# Patient Record
Sex: FEMALE | Race: WHITE | NOT HISPANIC OR LATINO | Employment: OTHER | URBAN - METROPOLITAN AREA
[De-identification: names, ages, dates, MRNs, and addresses within clinical notes are randomized per-mention and may not be internally consistent; named-entity substitution may affect disease eponyms.]

---

## 2024-01-29 ENCOUNTER — LAB REQUISITION (OUTPATIENT)
Dept: LAB | Facility: CLINIC | Age: 64
End: 2024-01-29
Payer: COMMERCIAL

## 2024-01-29 DIAGNOSIS — Z13.6 ENCOUNTER FOR SCREENING FOR CARDIOVASCULAR DISORDERS: ICD-10-CM

## 2024-01-29 DIAGNOSIS — Z13.1 ENCOUNTER FOR SCREENING FOR DIABETES MELLITUS: ICD-10-CM

## 2024-01-29 LAB
ALBUMIN SERPL BCG-MCNC: 4.2 G/DL (ref 3.5–5.2)
ALP SERPL-CCNC: 123 U/L (ref 40–150)
ALT SERPL W P-5'-P-CCNC: 19 U/L (ref 0–50)
ANION GAP SERPL CALCULATED.3IONS-SCNC: 10 MMOL/L (ref 7–15)
AST SERPL W P-5'-P-CCNC: 19 U/L (ref 0–45)
BILIRUB SERPL-MCNC: 0.2 MG/DL
BUN SERPL-MCNC: 12.9 MG/DL (ref 8–23)
CALCIUM SERPL-MCNC: 9.3 MG/DL (ref 8.8–10.2)
CHLORIDE SERPL-SCNC: 104 MMOL/L (ref 98–107)
CHOLEST SERPL-MCNC: 211 MG/DL
CREAT SERPL-MCNC: 0.61 MG/DL (ref 0.51–0.95)
DEPRECATED HCO3 PLAS-SCNC: 27 MMOL/L (ref 22–29)
EGFRCR SERPLBLD CKD-EPI 2021: >90 ML/MIN/1.73M2
FASTING STATUS PATIENT QL REPORTED: ABNORMAL
GLUCOSE SERPL-MCNC: 113 MG/DL (ref 70–99)
HDLC SERPL-MCNC: 45 MG/DL
LDLC SERPL CALC-MCNC: 97 MG/DL
NONHDLC SERPL-MCNC: 166 MG/DL
POTASSIUM SERPL-SCNC: 4 MMOL/L (ref 3.4–5.3)
PROT SERPL-MCNC: 7.5 G/DL (ref 6.4–8.3)
SODIUM SERPL-SCNC: 141 MMOL/L (ref 135–145)
TRIGL SERPL-MCNC: 344 MG/DL

## 2024-01-29 PROCEDURE — 80061 LIPID PANEL: CPT | Mod: ORL | Performed by: PHYSICIAN ASSISTANT

## 2024-01-29 PROCEDURE — 80053 COMPREHEN METABOLIC PANEL: CPT | Mod: ORL | Performed by: PHYSICIAN ASSISTANT

## 2024-03-09 ENCOUNTER — HEALTH MAINTENANCE LETTER (OUTPATIENT)
Age: 64
End: 2024-03-09

## 2024-07-31 ENCOUNTER — LAB REQUISITION (OUTPATIENT)
Dept: LAB | Facility: HOSPITAL | Age: 64
End: 2024-07-31
Payer: COMMERCIAL

## 2024-07-31 DIAGNOSIS — M25.551 PAIN IN RIGHT HIP: ICD-10-CM

## 2024-07-31 LAB
CRP SERPL-MCNC: <3 MG/L
D DIMER PPP FEU-MCNC: 1.17 UG/ML FEU (ref 0–0.5)
ERYTHROCYTE [SEDIMENTATION RATE] IN BLOOD BY WESTERGREN METHOD: 17 MM/HR (ref 0–30)

## 2024-07-31 PROCEDURE — 36415 COLL VENOUS BLD VENIPUNCTURE: CPT | Mod: ORL

## 2024-07-31 PROCEDURE — 86140 C-REACTIVE PROTEIN: CPT | Mod: ORL

## 2024-07-31 PROCEDURE — 85652 RBC SED RATE AUTOMATED: CPT | Mod: ORL

## 2024-07-31 PROCEDURE — 85379 FIBRIN DEGRADATION QUANT: CPT | Mod: ORL

## 2024-08-21 ENCOUNTER — HOSPITAL ENCOUNTER (INPATIENT)
Facility: CLINIC | Age: 64
Setting detail: SURGERY ADMIT
End: 2024-08-21
Attending: ORTHOPAEDIC SURGERY | Admitting: ORTHOPAEDIC SURGERY
Payer: MEDICARE

## 2024-08-23 RX ORDER — TIZANIDINE 2 MG/1
2 TABLET ORAL 3 TIMES DAILY
COMMUNITY
End: 2024-10-04 | Stop reason: HOSPADM

## 2024-08-23 RX ORDER — ALBUTEROL SULFATE 90 UG/1
2 INHALANT RESPIRATORY (INHALATION) EVERY 6 HOURS PRN
COMMUNITY
End: 2024-10-04 | Stop reason: HOSPADM

## 2024-08-30 ENCOUNTER — LAB REQUISITION (OUTPATIENT)
Dept: LAB | Facility: HOSPITAL | Age: 64
End: 2024-08-30
Payer: COMMERCIAL

## 2024-08-30 DIAGNOSIS — M25.551 PAIN IN RIGHT HIP: ICD-10-CM

## 2024-08-30 LAB
ALBUMIN SERPL BCG-MCNC: 4.4 G/DL (ref 3.5–5.2)
BASOPHILS # BLD AUTO: 0 10E3/UL (ref 0–0.2)
BASOPHILS NFR BLD AUTO: 0 %
BUN SERPL-MCNC: 14.9 MG/DL (ref 8–23)
CREAT SERPL-MCNC: 0.66 MG/DL (ref 0.51–0.95)
CRP SERPL-MCNC: 30.3 MG/L
D DIMER PPP FEU-MCNC: 1.38 UG/ML FEU (ref 0–0.5)
EGFRCR SERPLBLD CKD-EPI 2021: >90 ML/MIN/1.73M2
EOSINOPHIL # BLD AUTO: 0 10E3/UL (ref 0–0.7)
EOSINOPHIL NFR BLD AUTO: 0 %
ERYTHROCYTE [DISTWIDTH] IN BLOOD BY AUTOMATED COUNT: 13.9 % (ref 10–15)
ERYTHROCYTE [SEDIMENTATION RATE] IN BLOOD BY WESTERGREN METHOD: 37 MM/HR (ref 0–30)
HBA1C MFR BLD: 6.2 %
HCT VFR BLD AUTO: 39.2 % (ref 35–47)
HGB BLD-MCNC: 13 G/DL (ref 11.7–15.7)
IMM GRANULOCYTES # BLD: 0.1 10E3/UL
IMM GRANULOCYTES NFR BLD: 1 %
LYMPHOCYTES # BLD AUTO: 1.4 10E3/UL (ref 0.8–5.3)
LYMPHOCYTES NFR BLD AUTO: 10 %
MCH RBC QN AUTO: 29.9 PG (ref 26.5–33)
MCHC RBC AUTO-ENTMCNC: 33.2 G/DL (ref 31.5–36.5)
MCV RBC AUTO: 90 FL (ref 78–100)
MONOCYTES # BLD AUTO: 0.6 10E3/UL (ref 0–1.3)
MONOCYTES NFR BLD AUTO: 4 %
NEUTROPHILS # BLD AUTO: 12.3 10E3/UL (ref 1.6–8.3)
NEUTROPHILS NFR BLD AUTO: 86 %
NRBC # BLD AUTO: 0 10E3/UL
NRBC BLD AUTO-RTO: 0 /100
PLATELET # BLD AUTO: 228 10E3/UL (ref 150–450)
PLATELET # BLD AUTO: 228 10E3/UL (ref 150–450)
RBC # BLD AUTO: 4.35 10E6/UL (ref 3.8–5.2)
WBC # BLD AUTO: 14.3 10E3/UL (ref 4–11)

## 2024-08-30 PROCEDURE — 82040 ASSAY OF SERUM ALBUMIN: CPT | Mod: ORL | Performed by: ORTHOPAEDIC SURGERY

## 2024-08-30 PROCEDURE — 82565 ASSAY OF CREATININE: CPT | Mod: ORL | Performed by: ORTHOPAEDIC SURGERY

## 2024-08-30 PROCEDURE — 36415 COLL VENOUS BLD VENIPUNCTURE: CPT | Mod: ORL | Performed by: ORTHOPAEDIC SURGERY

## 2024-08-30 PROCEDURE — 84520 ASSAY OF UREA NITROGEN: CPT | Mod: ORL | Performed by: ORTHOPAEDIC SURGERY

## 2024-08-30 PROCEDURE — 85025 COMPLETE CBC W/AUTO DIFF WBC: CPT | Mod: ORL | Performed by: ORTHOPAEDIC SURGERY

## 2024-08-30 PROCEDURE — 83036 HEMOGLOBIN GLYCOSYLATED A1C: CPT | Mod: ORL | Performed by: ORTHOPAEDIC SURGERY

## 2024-08-30 PROCEDURE — 85652 RBC SED RATE AUTOMATED: CPT | Mod: ORL | Performed by: ORTHOPAEDIC SURGERY

## 2024-08-30 PROCEDURE — 85379 FIBRIN DEGRADATION QUANT: CPT | Mod: ORL | Performed by: ORTHOPAEDIC SURGERY

## 2024-08-30 PROCEDURE — 86140 C-REACTIVE PROTEIN: CPT | Mod: ORL | Performed by: ORTHOPAEDIC SURGERY

## 2024-09-11 NOTE — PROVIDER NOTIFICATION
Discharge plan according to [facility]:   08/23/24 1227   Discharge Planning   Patient/Family Anticipates Transition to home   Living Arrangements   People in Home alone   Type of Residence Private Residence   Is your private residence a single family home or apartment? Single family home   Number of Stairs, Within Home, Primary ten   Stair Railings, Within Home, Primary railings safe and in good condition   Once home, are you able to live on one level? Yes   Which level? Main Level   Bathroom Shower/Tub Tub/Shower unit   Equipment Currently Used at Home none   Support System   Support Systems Children   Do you have someone available to stay with you one or two nights once you are home? Yes  (daughter- Eva)        It was a pleasure to see you today.  You presented to the clinic to establish care.    Plan:  - referral for colonoscopy and mammogram were sent  - follow-up in 3 months  - plan for a pap smear at next visit    Follow up appointments:  Return in about 3 months (around 8/23/2024) for pap smear and f/u.     Health Maintenance Due  Health Maintenance Due   Topic Date Due    Pneumococcal Vaccine 0-64 (1 of 2 - PCV) Never done    Diabetes Eye Exam  Never done    Diabetes Foot Exam  Never done    DTaP/Tdap/Td Vaccine (1 - Tdap) Never done    Cervical Cancer Screening  Never done    Hepatitis B Vaccine (13 of 13 - Risk Dialysis Recombivax 3-dose series) 12/18/2020    DM/CKD Microalbumin  05/04/2023    Traditional Medicare- Medicare Wellness Visit  Never done    Colorectal Cancer Screen  Never done    COVID-19 Vaccine (1 - 2023-24 season) Never done    Diabetes A1C  02/29/2024    Breast Cancer Screening  04/14/2024       Further Instructions:  Please schedule a follow-up with us today before you leave.     For any labs completed today, the results can be accessed through the patient portal on the [employer].  Please sign up!      You may see your test results before I do.  I will be contacting you regarding their interpretation.    If you are unable to get labs completed today, the lab is open Monday through Friday from 8 am to 4:00 pm.  Call to schedule a lab visit.  Please fast at least 8 hours prior to your lab draw if having cholesterol checked.    If you require an imaging study and need to schedule an appointment, call central scheduling at [phone] to schedule all appointments.  Please ask for the location address at the time of the phone call.    If you require a referral to see a specialist, our staff will complete this on your behalf and provide you with the referral prior to you leaving.    Lissette Marshall, DO

## 2024-09-12 ENCOUNTER — LAB REQUISITION (OUTPATIENT)
Dept: LAB | Facility: CLINIC | Age: 64
End: 2024-09-12
Payer: MEDICARE

## 2024-09-12 DIAGNOSIS — Z01.818 ENCOUNTER FOR OTHER PREPROCEDURAL EXAMINATION: ICD-10-CM

## 2024-09-12 LAB
ANION GAP SERPL CALCULATED.3IONS-SCNC: 10 MMOL/L (ref 7–15)
BUN SERPL-MCNC: 12.6 MG/DL (ref 8–23)
CALCIUM SERPL-MCNC: 9.2 MG/DL (ref 8.8–10.4)
CHLORIDE SERPL-SCNC: 106 MMOL/L (ref 98–107)
CREAT SERPL-MCNC: 0.7 MG/DL (ref 0.51–0.95)
EGFRCR SERPLBLD CKD-EPI 2021: >90 ML/MIN/1.73M2
ERYTHROCYTE [DISTWIDTH] IN BLOOD BY AUTOMATED COUNT: 14.4 % (ref 10–15)
GLUCOSE SERPL-MCNC: 104 MG/DL (ref 70–99)
HCO3 SERPL-SCNC: 26 MMOL/L (ref 22–29)
HCT VFR BLD AUTO: 39 % (ref 35–47)
HGB BLD-MCNC: 12.2 G/DL (ref 11.7–15.7)
MCH RBC QN AUTO: 29.5 PG (ref 26.5–33)
MCHC RBC AUTO-ENTMCNC: 31.3 G/DL (ref 31.5–36.5)
MCV RBC AUTO: 94 FL (ref 78–100)
PLATELET # BLD AUTO: 193 10E3/UL (ref 150–450)
POTASSIUM SERPL-SCNC: 4.2 MMOL/L (ref 3.4–5.3)
RBC # BLD AUTO: 4.13 10E6/UL (ref 3.8–5.2)
SODIUM SERPL-SCNC: 142 MMOL/L (ref 135–145)
WBC # BLD AUTO: 6.9 10E3/UL (ref 4–11)

## 2024-09-12 PROCEDURE — 80048 BASIC METABOLIC PNL TOTAL CA: CPT | Mod: ORL | Performed by: PHYSICIAN ASSISTANT

## 2024-09-12 PROCEDURE — 85027 COMPLETE CBC AUTOMATED: CPT | Mod: ORL | Performed by: PHYSICIAN ASSISTANT

## 2024-10-02 RX ORDER — IBUPROFEN 200 MG
200 TABLET ORAL EVERY 4 HOURS PRN
COMMUNITY
End: 2024-10-04 | Stop reason: HOSPADM

## 2024-10-04 VITALS — HEIGHT: 67 IN | WEIGHT: 249.1 LBS | BODY MASS INDEX: 39.1 KG/M2

## 2024-10-04 NOTE — TREATMENT PLAN
Orthopedic Surgery Pre-Op Plan: Rosemary Kay  pre-op review. This is NOT an H&P   Surgeon: Dr. Sanches    [facility]: [facility]  Name of Surgery: Right Total Hip Arthroplasty Posterior Revision Both Components  Date of Surgery: 10/7/24  H&P: Completed on 9/12/24 by Estela Jiménez PA-C at [facility].   History of ASA, NSAIDS, vitamin and/or herbal supplements, GLP-1 Agonist or SGLT Inhibitor medication taken within 10 days?: Yes: Ibuprofen-patient instructed to hold this for 7 days before surgery.  History of blood thinners?: No    Plan:   1) Discharge Plan: Home POD 1 or POD 2 when medically stable for discharge with assist of daughter, Eva. Please see Discharge Planning section near bottom of this note for further details.     2) Hypertriglyceridemia: Not currently on any medications for this.    3) Obstructive Sleep Apnea: Does not use CPAP.  I recommend close monitoring of postop respiratory status.  If frequent O2 desats or apneic episodes, nursing to please notify Hospitalist.    4) Obesity: BMI 39, Wt: 249 lbs. I recommend continued efforts at safe weight loss following recovery from surgery. If patient is interested in further assistance with weight loss, please consider referral to [facility]. They offer both non-surgical and surgical evidence-based weight loss strategies. Call [phone] to schedule a consultation to learn more.      5) GERD: On omeprazole.    6) Bipolar 1 Disorder and Anxiety: Per PCP-had previously followed with psychiatry.  Not currently on any medications for this.     7) History of Left Breast Cancer (Invasive Ductal Carcinoma): S/P Left Lumpectomy and Radiation Treatment in 2016:     Patient appears medically optimized for upcoming surgery. I would recommend Hospitalist Consult to assist with medical management. Please call me below with any questions on this patient.       Review of Systems Notable for: For triglyceridemia, obstructive  sleep apnea-does not use CPAP, obesity, GERD, bipolar 1 disorder and anxiety, history of left breast cancer-s/p left lumpectomy and radiation treatment in 2016.    Past Medical History:   Past Medical History:   Diagnosis Date    Anemia     Arthritis     Gastroesophageal reflux disease     Liver disease     Obese      Past Surgical History:   Procedure Laterality Date    BIOPSY BREAST Left 9/13/2016    Procedure: Evacuation of Hematoma Left Breast;  Surgeon: Halina Pagan MD;  Location: [facility];  Service:     JOINT REPLACEMENT Left     KIDNEY SURGERY      mass excision on the left    LUMPECTOMY BREAST Left 09/07/2016    DR. PAGAN    LUMPECTOMY BREAST Left 09/07/2016    Dr. Pagan       Current Medications:  Patient's Medications   New Prescriptions    No medications on file   Previous Medications    ALBUTEROL (PROAIR HFA/PROVENTIL HFA/VENTOLIN HFA) 108 (90 BASE) MCG/ACT INHALER    Inhale 2 puffs into the lungs every 6 hours as needed for shortness of breath, wheezing or cough.    GABAPENTIN (NEURONTIN) 300 MG CAPSULE    [GABAPENTIN (NEURONTIN) 300 MG CAPSULE] Take 600 mg by mouth bedtime.    IBUPROFEN (ADVIL/MOTRIN) 200 MG TABLET    Take 200 mg by mouth every 4 hours as needed for pain.    OMEPRAZOLE (PRILOSEC) 20 MG DR CAPSULE    Take 20 mg by mouth daily as needed.    TIZANIDINE (ZANAFLEX) 2 MG TABLET    Take 2 mg by mouth 3 times daily.   Modified Medications    No medications on file   Discontinued Medications    CLONAZEPAM (KLONOPIN) 0.5 MG TABLET    [CLONAZEPAM (KLONOPIN) 0.5 MG TABLET] Take 1 mg by mouth bedtime.     FLUTICASONE (FLONASE) 50 MCG/ACTUATION NASAL SPRAY    [FLUTICASONE (FLONASE) 50 MCG/ACTUATION NASAL SPRAY] 2 sprays into each nostril daily as needed (seasonal allergies).     HYDROCODONE-ACETAMINOPHEN 5-325 MG PER TABLET    [HYDROCODONE-ACETAMINOPHEN 5-325 MG PER TABLET] Take 1-2 tablets by mouth every 6 (six) hours as needed.     LAMOTRIGINE (LAMICTAL) 200 MG TABLET    [LAMOTRIGINE  "(LAMICTAL) 200 MG TABLET] Take 400 mg by mouth bedtime.    OXYCODONE-ACETAMINOPHEN (PERCOCET) 5-325 MG PER TABLET    [OXYCODONE-ACETAMINOPHEN (PERCOCET) 5-325 MG PER TABLET] Take 1-2 tablets by mouth every 4 (four) hours as needed for pain.       ALLERGIES:  Allergies   Allergen Reactions    Codeine Unknown     Feels like skin is crawling but can take with benadryl.    Demerol [Meperidine] Unknown     Feels like skin is crawling but can take with benadryl.    Dilaudid [Hydromorphone] Unknown     Pt \"Turns into a monster and doesn't even know who she is\"    Iodinated Contrast Media [Iodinated Contrast Media] Hives    Latex Unknown     condoms    Morphine Unknown     Feels like skin is crawling but can take with benadryl.    Percocet [Oxycodone-Acetaminophen]      Itchy, irritable    Tramadol Itching     Itching but can take with benadryl    Vicodin [Hydrocodone-Acetaminophen] Unknown     Itchy, irritable       Social History  Social History     Tobacco Use    Smoking status: Former    Smokeless tobacco: Never    Tobacco comments:     smoke pot; no cigarettes   Substance Use Topics    Alcohol use: Yes     Comment: 3-4 per week    Drug use: Not Currently     Types: Marijuana       Any Abnormal Recent Diagnostics? Yes  Blood glucose 104 on 9/12/2024: No known history of prediabetes or diabetes.  We will monitor BG's during hospital stay.  Goal BG is <180.     Discharge Planning:   Discharge plan according to [facility]:    Home POD 1 or POD 2 when medically stable for discharge with assist of daughterEva    08/23/24 0562   Discharge Planning   Patient/Family Anticipates Transition to home   Living Arrangements   People in Home alone   Type of Residence Private Residence   Is your private residence a single family home or apartment? Single family home   Number of Stairs, Within Home, Primary ten   Stair Railings, Within Home, Primary railings safe and in good condition   Once home, are you able to live on " one level? Yes   Which level? Main Level   Bathroom Shower/Tub Tub/Shower unit   Equipment Currently Used at Home none   Support System   Support Systems Children   Do you have someone available to stay with you one or two nights once you are home? Yes  (daughter- Eva)       JAYLYN Esteban, CNP   Advanced Practice Nurse Navigator- Orthopedics  [employer]   Phone: [phone]

## 2024-10-05 ENCOUNTER — HEALTH MAINTENANCE LETTER (OUTPATIENT)
Age: 64
End: 2024-10-05

## 2024-10-25 ENCOUNTER — LAB REQUISITION (OUTPATIENT)
Dept: LAB | Facility: CLINIC | Age: 64
End: 2024-10-25
Payer: MEDICARE

## 2024-10-25 DIAGNOSIS — Z01.818 ENCOUNTER FOR OTHER PREPROCEDURAL EXAMINATION: ICD-10-CM

## 2024-10-25 LAB
ANION GAP SERPL CALCULATED.3IONS-SCNC: 12 MMOL/L (ref 7–15)
BUN SERPL-MCNC: 12.1 MG/DL (ref 8–23)
CALCIUM SERPL-MCNC: 9.3 MG/DL (ref 8.8–10.4)
CHLORIDE SERPL-SCNC: 103 MMOL/L (ref 98–107)
CREAT SERPL-MCNC: 0.68 MG/DL (ref 0.51–0.95)
EGFRCR SERPLBLD CKD-EPI 2021: >90 ML/MIN/1.73M2
ERYTHROCYTE [DISTWIDTH] IN BLOOD BY AUTOMATED COUNT: 13.8 % (ref 10–15)
GLUCOSE SERPL-MCNC: 96 MG/DL (ref 70–99)
HCO3 SERPL-SCNC: 26 MMOL/L (ref 22–29)
HCT VFR BLD AUTO: 41.2 % (ref 35–47)
HGB BLD-MCNC: 13.1 G/DL (ref 11.7–15.7)
MCH RBC QN AUTO: 29.8 PG (ref 26.5–33)
MCHC RBC AUTO-ENTMCNC: 31.8 G/DL (ref 31.5–36.5)
MCV RBC AUTO: 94 FL (ref 78–100)
PLATELET # BLD AUTO: 211 10E3/UL (ref 150–450)
POTASSIUM SERPL-SCNC: 4.6 MMOL/L (ref 3.4–5.3)
RBC # BLD AUTO: 4.39 10E6/UL (ref 3.8–5.2)
SODIUM SERPL-SCNC: 141 MMOL/L (ref 135–145)
WBC # BLD AUTO: 7.4 10E3/UL (ref 4–11)

## 2024-10-25 PROCEDURE — 85027 COMPLETE CBC AUTOMATED: CPT | Mod: ORL | Performed by: PHYSICIAN ASSISTANT

## 2024-10-25 PROCEDURE — 80048 BASIC METABOLIC PNL TOTAL CA: CPT | Mod: ORL | Performed by: PHYSICIAN ASSISTANT

## 2024-11-01 RX ORDER — CELECOXIB 200 MG/1
200 CAPSULE ORAL DAILY
COMMUNITY

## 2024-11-04 NOTE — TREATMENT PLAN
Orthopedic Surgery Pre-Op Plan: Rosemary Kay  pre-op review. This is NOT an H&P   Surgeon: Dr. Sanches    Hospital: [facility]  Name of Surgery: Right Total Hip Arthroplasty Posterior Revision Both Components  Date of Surgery: 11/5/24 (surgery originally scheduled for 10/4/24 but was cancelled/rescheduled due to IV fluid shortage).  H&P: Completed on 10/25/24 by Estela Jiménez PA-C at [facility].   History of ASA, NSAIDS, vitamin and/or herbal supplements, GLP-1 Agonist or SGLT Inhibitor medication taken within 10 days?: Yes: Celebrex: Patient instructed to follow Dr. Sanches's directions regarding Celebrex use prior to surgery.   History of blood thinners?: No    Plan:   1) Discharge Plan: Home POD 1 or POD 2 when medically stable for discharge with assist of daughter, Eva. Please see Discharge Planning section near bottom of this note for further details.      2) Hypertriglyceridemia: Not currently on any medications for this.     3) Obstructive Sleep Apnea: Does not use CPAP.  I recommend close monitoring of postop respiratory status.  If frequent O2 desats or apneic episodes, nursing to please notify Hospitalist.     4) Obesity: BMI 38, Wt: 247 lbs. I recommend continued efforts at safe weight loss following recovery from surgery. If patient is interested in further assistance with weight loss, please consider referral to [facility] Management Program. They offer both non-surgical and surgical evidence-based weight loss strategies. Call [phone] to schedule a consultation to learn more.       5) GERD: On omeprazole.     6) Bipolar 1 Disorder and Anxiety: Per PCP-had previously followed with psychiatry.  Not currently on any medications for this.      7) History of Left Breast Cancer (Invasive Ductal Carcinoma): S/P Left Lumpectomy and Radiation Treatment in 2016:     Patient appears medically optimized for upcoming surgery. I would recommend Hospitalist Consult to assist with  "medical management. Please call me below with any questions on this patient.       Review of Systems Notable for: Hypertriglyceridemia, obstructive sleep apnea-does not use CPAP, obesity, GERD, bipolar 1 disorder and anxiety, history of left breast cancer-s/p left lumpectomy and radiation treatment in 2016.     Past Medical History:   Past Medical History:   Diagnosis Date    Anemia     Arthritis     Gastroesophageal reflux disease     Liver disease     Obese     Sleep apnea      Past Surgical History:   Procedure Laterality Date    BIOPSY BREAST Left 9/13/2016    Procedure: Evacuation of Hematoma Left Breast;  Surgeon: Halina Pgaan MD;  Location: [facility];  Service:     JOINT REPLACEMENT Left     KIDNEY SURGERY      mass excision on the left    LUMPECTOMY BREAST Left 09/07/2016    DR. PAGAN    LUMPECTOMY BREAST Left 09/07/2016    Dr. Pagan       Current Medications:  Patient's Medications   New Prescriptions    No medications on file   Previous Medications    CELECOXIB (CELEBREX) 200 MG CAPSULE    Take 200 mg by mouth daily.    GABAPENTIN (NEURONTIN) 300 MG CAPSULE    [GABAPENTIN (NEURONTIN) 300 MG CAPSULE] Take 600 mg by mouth bedtime.   Modified Medications    No medications on file   Discontinued Medications    No medications on file       ALLERGIES:  Allergies   Allergen Reactions    Codeine Unknown     Feels like skin is crawling but can take with benadryl.    Demerol [Meperidine] Unknown     Feels like skin is crawling but can take with benadryl.    Dilaudid [Hydromorphone] Unknown     Pt \"Turns into a monster and doesn't even know who she is\"    Iodinated Contrast Media [Iodinated Contrast Media] Hives    Latex Unknown     condoms    Morphine Unknown     Feels like skin is crawling but can take with benadryl.    Percocet [Oxycodone-Acetaminophen]      Itchy, irritable    Tramadol Itching     Itching but can take with benadryl    Vicodin [Hydrocodone-Acetaminophen] Unknown     Itchy, irritable "       Social History  Social History     Tobacco Use    Smoking status: Former    Smokeless tobacco: Never    Tobacco comments:     smoke pot; no cigarettes   Substance Use Topics    Alcohol use: Yes     Comment: 3-4 per week    Drug use: Not Currently     Types: Marijuana       Any Abnormal Recent Diagnostics? No    Discharge Planning:     Home POD 1 or POD 2 when medically stable for discharge with assist of Eva griffin    08/23/24 8379   Discharge Planning   Patient/Family Anticipates Transition to home   Living Arrangements   People in Home alone   Type of Residence Private Residence   Is your private residence a single family home or apartment? Single family home   Number of Stairs, Within Home, Primary ten   Stair Railings, Within Home, Primary railings safe and in good condition   Once home, are you able to live on one level? Yes   Which level? Main Level   Bathroom Shower/Tub Tub/Shower unit   Equipment Currently Used at Home none   Support System   Support Systems Children   Do you have someone available to stay with you one or two nights once you are home? Yes  (daughter- Eva)       JAYLYN Esteban, CNP   Advanced Practice Nurse Navigator- Orthopedics  [facility]   Phone: [phone]

## 2024-11-05 ENCOUNTER — ANESTHESIA EVENT (OUTPATIENT)
Dept: SURGERY | Facility: CLINIC | Age: 64
DRG: 467 | End: 2024-11-05
Payer: MEDICARE

## 2024-11-05 ENCOUNTER — HOSPITAL ENCOUNTER (INPATIENT)
Facility: CLINIC | Age: 64
LOS: 3 days | Discharge: HOME | DRG: 467 | End: 2024-11-08
Attending: ORTHOPAEDIC SURGERY | Admitting: ORTHOPAEDIC SURGERY
Payer: MEDICARE

## 2024-11-05 ENCOUNTER — APPOINTMENT (OUTPATIENT)
Dept: RADIOLOGY | Facility: CLINIC | Age: 64
DRG: 467 | End: 2024-11-05
Payer: MEDICARE

## 2024-11-05 ENCOUNTER — ANESTHESIA (OUTPATIENT)
Dept: SURGERY | Facility: CLINIC | Age: 64
DRG: 467 | End: 2024-11-05
Payer: MEDICARE

## 2024-11-05 ENCOUNTER — APPOINTMENT (OUTPATIENT)
Dept: PHYSICAL THERAPY | Facility: CLINIC | Age: 64
DRG: 467 | End: 2024-11-05
Attending: ORTHOPAEDIC SURGERY
Payer: MEDICARE

## 2024-11-05 DIAGNOSIS — D62 ANEMIA DUE TO BLOOD LOSS, ACUTE: ICD-10-CM

## 2024-11-05 DIAGNOSIS — T84.51XA INFECTION ASSOCIATED WITH INTERNAL RIGHT HIP PROSTHESIS, INITIAL ENCOUNTER (H): ICD-10-CM

## 2024-11-05 DIAGNOSIS — Z96.649 S/P REVISION OF TOTAL HIP: Primary | ICD-10-CM

## 2024-11-05 LAB
ABO + RH BLD: NORMAL
BACTERIA SPEC CULT: NORMAL
BLD GP AB SCN SERPL QL: NEGATIVE
CREAT SERPL-MCNC: 0.63 MG/DL (ref 0.51–0.95)
CRP SERPL HS-MCNC: 82.5 MG/L
CRP SERPL-MCNC: 69.7 MG/L
EGFRCR SERPLBLD CKD-EPI 2021: >90 ML/MIN/1.73M2
ERYTHROCYTE [DISTWIDTH] IN BLOOD BY AUTOMATED COUNT: 13.9 % (ref 10–15)
ERYTHROCYTE [SEDIMENTATION RATE] IN BLOOD BY WESTERGREN METHOD: 37 MM/HR (ref 0–30)
GLUCOSE BLDC GLUCOMTR-MCNC: 114 MG/DL (ref 70–99)
GRAM STAIN RESULT: NORMAL
GRAM STAIN RESULT: NORMAL
HCT VFR BLD AUTO: 38.4 % (ref 35–47)
HGB BLD-MCNC: 12.7 G/DL (ref 11.7–15.7)
INR PPP: 1.03 (ref 0.85–1.15)
MCH RBC QN AUTO: 29.7 PG (ref 26.5–33)
MCHC RBC AUTO-ENTMCNC: 33.1 G/DL (ref 31.5–36.5)
MCV RBC AUTO: 90 FL (ref 78–100)
PLATELET # BLD AUTO: 173 10E3/UL (ref 150–450)
POTASSIUM SERPL-SCNC: 3.7 MMOL/L (ref 3.4–5.3)
RBC # BLD AUTO: 4.27 10E6/UL (ref 3.8–5.2)
SPECIMEN EXP DATE BLD: NORMAL
WBC # BLD AUTO: 9.8 10E3/UL (ref 4–11)

## 2024-11-05 PROCEDURE — 250N000009 HC RX 250: Performed by: ORTHOPAEDIC SURGERY

## 2024-11-05 PROCEDURE — C1713 ANCHOR/SCREW BN/BN,TIS/BN: HCPCS | Performed by: ORTHOPAEDIC SURGERY

## 2024-11-05 PROCEDURE — 0SR902A REPLACEMENT OF RIGHT HIP JOINT WITH METAL ON POLYETHYLENE SYNTHETIC SUBSTITUTE, UNCEMENTED, OPEN APPROACH: ICD-10-PCS | Performed by: ORTHOPAEDIC SURGERY

## 2024-11-05 PROCEDURE — 710N000010 HC RECOVERY PHASE 1, LEVEL 2, PER MIN: Performed by: ORTHOPAEDIC SURGERY

## 2024-11-05 PROCEDURE — 87070 CULTURE OTHR SPECIMN AEROBIC: CPT | Performed by: ORTHOPAEDIC SURGERY

## 2024-11-05 PROCEDURE — 999N000065 XR PELVIS PORT 1/2 VIEWS

## 2024-11-05 PROCEDURE — 370N000017 HC ANESTHESIA TECHNICAL FEE, PER MIN: Performed by: ORTHOPAEDIC SURGERY

## 2024-11-05 PROCEDURE — 250N000011 HC RX IP 250 OP 636: Performed by: ORTHOPAEDIC SURGERY

## 2024-11-05 PROCEDURE — 84132 ASSAY OF SERUM POTASSIUM: CPT

## 2024-11-05 PROCEDURE — 250N000011 HC RX IP 250 OP 636: Mod: JW | Performed by: ANESTHESIOLOGY

## 2024-11-05 PROCEDURE — 99222 1ST HOSP IP/OBS MODERATE 55: CPT | Performed by: INTERNAL MEDICINE

## 2024-11-05 PROCEDURE — 250N000009 HC RX 250: Performed by: ANESTHESIOLOGY

## 2024-11-05 PROCEDURE — 87075 CULTR BACTERIA EXCEPT BLOOD: CPT | Performed by: ORTHOPAEDIC SURGERY

## 2024-11-05 PROCEDURE — 85018 HEMOGLOBIN: CPT

## 2024-11-05 PROCEDURE — C1776 JOINT DEVICE (IMPLANTABLE): HCPCS | Performed by: ORTHOPAEDIC SURGERY

## 2024-11-05 PROCEDURE — 999N000141 HC STATISTIC PRE-PROCEDURE NURSING ASSESSMENT: Performed by: ORTHOPAEDIC SURGERY

## 2024-11-05 PROCEDURE — 258N000003 HC RX IP 258 OP 636

## 2024-11-05 PROCEDURE — 86140 C-REACTIVE PROTEIN: CPT

## 2024-11-05 PROCEDURE — 250N000009 HC RX 250: Performed by: NURSE PRACTITIONER

## 2024-11-05 PROCEDURE — 250N000009 HC RX 250: Performed by: NURSE ANESTHETIST, CERTIFIED REGISTERED

## 2024-11-05 PROCEDURE — 250N000011 HC RX IP 250 OP 636

## 2024-11-05 PROCEDURE — 82565 ASSAY OF CREATININE: CPT

## 2024-11-05 PROCEDURE — 250N000011 HC RX IP 250 OP 636: Performed by: ANESTHESIOLOGY

## 2024-11-05 PROCEDURE — 250N000013 HC RX MED GY IP 250 OP 250 PS 637: Performed by: NURSE PRACTITIONER

## 2024-11-05 PROCEDURE — 97110 THERAPEUTIC EXERCISES: CPT | Mod: GP

## 2024-11-05 PROCEDURE — 97530 THERAPEUTIC ACTIVITIES: CPT | Mod: GP

## 2024-11-05 PROCEDURE — 258N000003 HC RX IP 258 OP 636: Performed by: NURSE ANESTHETIST, CERTIFIED REGISTERED

## 2024-11-05 PROCEDURE — P9045 ALBUMIN (HUMAN), 5%, 250 ML: HCPCS | Performed by: NURSE ANESTHETIST, CERTIFIED REGISTERED

## 2024-11-05 PROCEDURE — 87205 SMEAR GRAM STAIN: CPT | Performed by: ORTHOPAEDIC SURGERY

## 2024-11-05 PROCEDURE — 0SP90JZ REMOVAL OF SYNTHETIC SUBSTITUTE FROM RIGHT HIP JOINT, OPEN APPROACH: ICD-10-PCS | Performed by: ORTHOPAEDIC SURGERY

## 2024-11-05 PROCEDURE — 99222 1ST HOSP IP/OBS MODERATE 55: CPT | Performed by: NURSE PRACTITIONER

## 2024-11-05 PROCEDURE — 85652 RBC SED RATE AUTOMATED: CPT

## 2024-11-05 PROCEDURE — 360N000078 HC SURGERY LEVEL 5, PER MIN: Performed by: ORTHOPAEDIC SURGERY

## 2024-11-05 PROCEDURE — 258N000003 HC RX IP 258 OP 636: Performed by: ANESTHESIOLOGY

## 2024-11-05 PROCEDURE — 85041 AUTOMATED RBC COUNT: CPT

## 2024-11-05 PROCEDURE — 85610 PROTHROMBIN TIME: CPT

## 2024-11-05 PROCEDURE — 97161 PT EVAL LOW COMPLEX 20 MIN: CPT | Mod: GP

## 2024-11-05 PROCEDURE — 99222 1ST HOSP IP/OBS MODERATE 55: CPT | Mod: AI | Performed by: STUDENT IN AN ORGANIZED HEALTH CARE EDUCATION/TRAINING PROGRAM

## 2024-11-05 PROCEDURE — 250N000011 HC RX IP 250 OP 636: Performed by: NURSE ANESTHETIST, CERTIFIED REGISTERED

## 2024-11-05 PROCEDURE — 250N000009 HC RX 250

## 2024-11-05 PROCEDURE — 272N000001 HC OR GENERAL SUPPLY STERILE: Performed by: ORTHOPAEDIC SURGERY

## 2024-11-05 PROCEDURE — 86900 BLOOD TYPING SEROLOGIC ABO: CPT

## 2024-11-05 PROCEDURE — 250N000013 HC RX MED GY IP 250 OP 250 PS 637

## 2024-11-05 PROCEDURE — 258N000001 HC RX 258: Performed by: ORTHOPAEDIC SURGERY

## 2024-11-05 PROCEDURE — 36415 COLL VENOUS BLD VENIPUNCTURE: CPT

## 2024-11-05 PROCEDURE — 86141 C-REACTIVE PROTEIN HS: CPT

## 2024-11-05 PROCEDURE — 120N000001 HC R&B MED SURG/OB

## 2024-11-05 DEVICE — PINNACLE HIP SOLUTIONS ALTRX LD POLYETHYLENE ACETABULAR LINER +4 NEUTRAL 40MM ID 56MM OD
Type: IMPLANTABLE DEVICE | Site: HIP | Status: FUNCTIONAL
Brand: PINNACLE ALTRX

## 2024-11-05 DEVICE — TOBRA FULL DOSE ANTIBIOTIC BONE CEMENT, 10 PACK CATALOG NUMBER IS 6197-9-010
Type: IMPLANTABLE DEVICE | Site: HIP | Status: FUNCTIONAL
Brand: SIMPLEX

## 2024-11-05 DEVICE — M-SPEC METAL FEMORAL HEAD 12/14 TAPER DIAMETER 40MM +1.5 OFFSET: Type: IMPLANTABLE DEVICE | Site: HIP | Status: FUNCTIONAL

## 2024-11-05 DEVICE — STEM FEMORAL PROSTALAC HIGH 105MM SZ 1: Type: IMPLANTABLE DEVICE | Site: HIP | Status: FUNCTIONAL

## 2024-11-05 RX ORDER — NALOXONE HYDROCHLORIDE 0.4 MG/ML
0.4 INJECTION, SOLUTION INTRAMUSCULAR; INTRAVENOUS; SUBCUTANEOUS
Status: DISCONTINUED | OUTPATIENT
Start: 2024-11-05 | End: 2024-11-08 | Stop reason: HOSPADM

## 2024-11-05 RX ORDER — ONDANSETRON 2 MG/ML
INJECTION INTRAMUSCULAR; INTRAVENOUS PRN
Status: DISCONTINUED | OUTPATIENT
Start: 2024-11-05 | End: 2024-11-05

## 2024-11-05 RX ORDER — OXYCODONE HYDROCHLORIDE 5 MG/1
10 TABLET ORAL EVERY 4 HOURS PRN
Status: DISCONTINUED | OUTPATIENT
Start: 2024-11-05 | End: 2024-11-05

## 2024-11-05 RX ORDER — ONDANSETRON 4 MG/1
4 TABLET, ORALLY DISINTEGRATING ORAL EVERY 30 MIN PRN
Status: DISCONTINUED | OUTPATIENT
Start: 2024-11-05 | End: 2024-11-05 | Stop reason: HOSPADM

## 2024-11-05 RX ORDER — LIDOCAINE 40 MG/G
CREAM TOPICAL
Status: DISCONTINUED | OUTPATIENT
Start: 2024-11-05 | End: 2024-11-05 | Stop reason: HOSPADM

## 2024-11-05 RX ORDER — DIPHENHYDRAMINE HCL 12.5 MG/5ML
12.5 SOLUTION ORAL EVERY 6 HOURS PRN
Status: DISCONTINUED | OUTPATIENT
Start: 2024-11-05 | End: 2024-11-05

## 2024-11-05 RX ORDER — DIPHENHYDRAMINE HCL 25 MG
25 CAPSULE ORAL ONCE
Status: COMPLETED | OUTPATIENT
Start: 2024-11-05 | End: 2024-11-05

## 2024-11-05 RX ORDER — EPHEDRINE SULFATE 50 MG/ML
INJECTION, SOLUTION INTRAMUSCULAR; INTRAVENOUS; SUBCUTANEOUS PRN
Status: DISCONTINUED | OUTPATIENT
Start: 2024-11-05 | End: 2024-11-05

## 2024-11-05 RX ORDER — KETAMINE HYDROCHLORIDE 10 MG/ML
INJECTION INTRAMUSCULAR; INTRAVENOUS PRN
Status: DISCONTINUED | OUTPATIENT
Start: 2024-11-05 | End: 2024-11-05

## 2024-11-05 RX ORDER — SODIUM CHLORIDE, SODIUM LACTATE, POTASSIUM CHLORIDE, CALCIUM CHLORIDE 600; 310; 30; 20 MG/100ML; MG/100ML; MG/100ML; MG/100ML
INJECTION, SOLUTION INTRAVENOUS CONTINUOUS
Status: DISCONTINUED | OUTPATIENT
Start: 2024-11-05 | End: 2024-11-05 | Stop reason: HOSPADM

## 2024-11-05 RX ORDER — MORPHINE SULFATE 4 MG/ML
1 INJECTION, SOLUTION INTRAMUSCULAR; INTRAVENOUS
Status: DISCONTINUED | OUTPATIENT
Start: 2024-11-05 | End: 2024-11-05

## 2024-11-05 RX ORDER — ASPIRIN 81 MG/1
81 TABLET ORAL 2 TIMES DAILY
Status: DISCONTINUED | OUTPATIENT
Start: 2024-11-05 | End: 2024-11-08 | Stop reason: HOSPADM

## 2024-11-05 RX ORDER — OMEPRAZOLE 20 MG/1
20 TABLET, DELAYED RELEASE ORAL DAILY PRN
COMMUNITY

## 2024-11-05 RX ORDER — ACETAMINOPHEN 325 MG/1
975 TABLET ORAL ONCE
Status: COMPLETED | OUTPATIENT
Start: 2024-11-05 | End: 2024-11-05

## 2024-11-05 RX ORDER — MORPHINE SULFATE 0.5 MG/ML
1.5 INJECTION, SOLUTION EPIDURAL; INTRATHECAL; INTRAVENOUS ONCE
Status: DISCONTINUED | OUTPATIENT
Start: 2024-11-05 | End: 2024-11-05

## 2024-11-05 RX ORDER — LIDOCAINE 40 MG/G
CREAM TOPICAL
Status: DISCONTINUED | OUTPATIENT
Start: 2024-11-05 | End: 2024-11-08 | Stop reason: HOSPADM

## 2024-11-05 RX ORDER — CEFTRIAXONE 2 G/1
2 INJECTION, POWDER, FOR SOLUTION INTRAMUSCULAR; INTRAVENOUS EVERY 24 HOURS
Status: DISCONTINUED | OUTPATIENT
Start: 2024-11-05 | End: 2024-11-06

## 2024-11-05 RX ORDER — METHOCARBAMOL 750 MG/1
750 TABLET, FILM COATED ORAL 4 TIMES DAILY
Status: DISCONTINUED | OUTPATIENT
Start: 2024-11-05 | End: 2024-11-05

## 2024-11-05 RX ORDER — TOBRAMYCIN 1.2 G/30ML
6 INJECTION, POWDER, LYOPHILIZED, FOR SOLUTION INTRAVENOUS ONCE
Status: COMPLETED | OUTPATIENT
Start: 2024-11-05 | End: 2024-11-05

## 2024-11-05 RX ORDER — TRANEXAMIC ACID 650 MG/1
1950 TABLET ORAL ONCE
Status: COMPLETED | OUTPATIENT
Start: 2024-11-05 | End: 2024-11-05

## 2024-11-05 RX ORDER — OXYCODONE HYDROCHLORIDE 5 MG/1
10 TABLET ORAL
Status: DISCONTINUED | OUTPATIENT
Start: 2024-11-05 | End: 2024-11-08 | Stop reason: HOSPADM

## 2024-11-05 RX ORDER — ACETAMINOPHEN 325 MG/1
975 TABLET ORAL EVERY 6 HOURS
Status: DISCONTINUED | OUTPATIENT
Start: 2024-11-05 | End: 2024-11-08 | Stop reason: HOSPADM

## 2024-11-05 RX ORDER — BUPIVACAINE HYDROCHLORIDE 5 MG/ML
INJECTION, SOLUTION EPIDURAL; INTRACAUDAL
Status: COMPLETED | OUTPATIENT
Start: 2024-11-05 | End: 2024-11-05

## 2024-11-05 RX ORDER — HYDROXYZINE HYDROCHLORIDE 25 MG/1
50 TABLET, FILM COATED ORAL EVERY 6 HOURS PRN
Status: DISCONTINUED | OUTPATIENT
Start: 2024-11-05 | End: 2024-11-08 | Stop reason: HOSPADM

## 2024-11-05 RX ORDER — ACETAMINOPHEN 325 MG/1
975 TABLET ORAL EVERY 8 HOURS
Status: DISCONTINUED | OUTPATIENT
Start: 2024-11-05 | End: 2024-11-05

## 2024-11-05 RX ORDER — KETOROLAC TROMETHAMINE 30 MG/ML
15 INJECTION, SOLUTION INTRAMUSCULAR; INTRAVENOUS EVERY 6 HOURS
Status: DISCONTINUED | OUTPATIENT
Start: 2024-11-05 | End: 2024-11-05

## 2024-11-05 RX ORDER — GABAPENTIN 300 MG/1
300 CAPSULE ORAL DAILY PRN
COMMUNITY
End: 2024-12-05

## 2024-11-05 RX ORDER — OXYCODONE HYDROCHLORIDE 5 MG/1
5 TABLET ORAL
Status: DISCONTINUED | OUTPATIENT
Start: 2024-11-05 | End: 2024-11-08 | Stop reason: HOSPADM

## 2024-11-05 RX ORDER — MINERAL OIL
OIL (ML) MISCELLANEOUS PRN
Status: DISCONTINUED | OUTPATIENT
Start: 2024-11-05 | End: 2024-11-05 | Stop reason: HOSPADM

## 2024-11-05 RX ORDER — NALOXONE HYDROCHLORIDE 0.4 MG/ML
0.2 INJECTION, SOLUTION INTRAMUSCULAR; INTRAVENOUS; SUBCUTANEOUS
Status: DISCONTINUED | OUTPATIENT
Start: 2024-11-05 | End: 2024-11-08 | Stop reason: HOSPADM

## 2024-11-05 RX ORDER — TOBRAMYCIN 1.2 G/30ML
1.2 INJECTION, POWDER, LYOPHILIZED, FOR SOLUTION INTRAVENOUS ONCE
Status: COMPLETED | OUTPATIENT
Start: 2024-11-05 | End: 2024-11-05

## 2024-11-05 RX ORDER — KETOROLAC TROMETHAMINE 30 MG/ML
INJECTION, SOLUTION INTRAMUSCULAR; INTRAVENOUS PRN
Status: DISCONTINUED | OUTPATIENT
Start: 2024-11-05 | End: 2024-11-05

## 2024-11-05 RX ORDER — METOCLOPRAMIDE HYDROCHLORIDE 5 MG/ML
INJECTION INTRAMUSCULAR; INTRAVENOUS PRN
Status: DISCONTINUED | OUTPATIENT
Start: 2024-11-05 | End: 2024-11-05

## 2024-11-05 RX ORDER — ALBUTEROL SULFATE 90 UG/1
2 INHALANT RESPIRATORY (INHALATION) EVERY 6 HOURS PRN
COMMUNITY

## 2024-11-05 RX ORDER — ACETAMINOPHEN 325 MG/1
650 TABLET ORAL EVERY 4 HOURS PRN
Status: DISCONTINUED | OUTPATIENT
Start: 2024-11-08 | End: 2024-11-05

## 2024-11-05 RX ORDER — GABAPENTIN 300 MG/1
600 CAPSULE ORAL AT BEDTIME
Status: DISCONTINUED | OUTPATIENT
Start: 2024-11-05 | End: 2024-11-08 | Stop reason: HOSPADM

## 2024-11-05 RX ORDER — MORPHINE SULFATE 4 MG/ML
2 INJECTION, SOLUTION INTRAMUSCULAR; INTRAVENOUS
Status: DISCONTINUED | OUTPATIENT
Start: 2024-11-05 | End: 2024-11-05

## 2024-11-05 RX ORDER — CELECOXIB 200 MG/1
200 CAPSULE ORAL DAILY
Status: DISCONTINUED | OUTPATIENT
Start: 2024-11-05 | End: 2024-11-05

## 2024-11-05 RX ORDER — POLYETHYLENE GLYCOL 3350 17 G/17G
17 POWDER, FOR SOLUTION ORAL DAILY
Status: DISCONTINUED | OUTPATIENT
Start: 2024-11-06 | End: 2024-11-08 | Stop reason: HOSPADM

## 2024-11-05 RX ORDER — GABAPENTIN 300 MG/1
300 CAPSULE ORAL AT BEDTIME
Status: DISCONTINUED | OUTPATIENT
Start: 2024-11-05 | End: 2024-11-05

## 2024-11-05 RX ORDER — VANCOMYCIN HYDROCHLORIDE 1 G/20ML
2 INJECTION, POWDER, LYOPHILIZED, FOR SOLUTION INTRAVENOUS ONCE
Status: DISCONTINUED | OUTPATIENT
Start: 2024-11-05 | End: 2024-11-05

## 2024-11-05 RX ORDER — HEPARIN SOD,PORCINE/0.9 % NACL 30K/1000ML
INTRAVENOUS SOLUTION INTRAVENOUS ONCE
Status: COMPLETED | OUTPATIENT
Start: 2024-11-05 | End: 2024-11-05

## 2024-11-05 RX ORDER — VANCOMYCIN HYDROCHLORIDE 1 G/20ML
INJECTION, POWDER, LYOPHILIZED, FOR SOLUTION INTRAVENOUS PRN
Status: DISCONTINUED | OUTPATIENT
Start: 2024-11-05 | End: 2024-11-05 | Stop reason: HOSPADM

## 2024-11-05 RX ORDER — METHOCARBAMOL 500 MG/1
500 TABLET, FILM COATED ORAL EVERY 6 HOURS PRN
Status: DISCONTINUED | OUTPATIENT
Start: 2024-11-05 | End: 2024-11-05

## 2024-11-05 RX ORDER — FENTANYL CITRATE 0.05 MG/ML
INJECTION, SOLUTION INTRAMUSCULAR; INTRAVENOUS PRN
Status: DISCONTINUED | OUTPATIENT
Start: 2024-11-05 | End: 2024-11-05

## 2024-11-05 RX ORDER — DEXAMETHASONE SODIUM PHOSPHATE 10 MG/ML
INJECTION, SOLUTION INTRAMUSCULAR; INTRAVENOUS PRN
Status: DISCONTINUED | OUTPATIENT
Start: 2024-11-05 | End: 2024-11-05

## 2024-11-05 RX ORDER — FENTANYL CITRATE 50 UG/ML
50 INJECTION, SOLUTION INTRAMUSCULAR; INTRAVENOUS EVERY 5 MIN PRN
Status: DISCONTINUED | OUTPATIENT
Start: 2024-11-05 | End: 2024-11-05 | Stop reason: HOSPADM

## 2024-11-05 RX ORDER — PROPOFOL 10 MG/ML
INJECTION, EMULSION INTRAVENOUS CONTINUOUS PRN
Status: DISCONTINUED | OUTPATIENT
Start: 2024-11-05 | End: 2024-11-05

## 2024-11-05 RX ORDER — FENTANYL CITRATE 50 UG/ML
25 INJECTION, SOLUTION INTRAMUSCULAR; INTRAVENOUS EVERY 5 MIN PRN
Status: DISCONTINUED | OUTPATIENT
Start: 2024-11-05 | End: 2024-11-05 | Stop reason: HOSPADM

## 2024-11-05 RX ORDER — NALOXONE HYDROCHLORIDE 0.4 MG/ML
0.1 INJECTION, SOLUTION INTRAMUSCULAR; INTRAVENOUS; SUBCUTANEOUS
Status: DISCONTINUED | OUTPATIENT
Start: 2024-11-05 | End: 2024-11-05 | Stop reason: HOSPADM

## 2024-11-05 RX ORDER — MORPHINE SULFATE 2 MG/ML
2-4 INJECTION, SOLUTION INTRAMUSCULAR; INTRAVENOUS EVERY 4 HOURS PRN
Status: DISCONTINUED | OUTPATIENT
Start: 2024-11-05 | End: 2024-11-05

## 2024-11-05 RX ORDER — MORPHINE SULFATE 2 MG/ML
2 INJECTION, SOLUTION INTRAMUSCULAR; INTRAVENOUS ONCE
Status: COMPLETED | OUTPATIENT
Start: 2024-11-05 | End: 2024-11-05

## 2024-11-05 RX ORDER — DEXAMETHASONE SODIUM PHOSPHATE 4 MG/ML
4 INJECTION, SOLUTION INTRA-ARTICULAR; INTRALESIONAL; INTRAMUSCULAR; INTRAVENOUS; SOFT TISSUE
Status: DISCONTINUED | OUTPATIENT
Start: 2024-11-05 | End: 2024-11-05 | Stop reason: HOSPADM

## 2024-11-05 RX ORDER — DIPHENHYDRAMINE HYDROCHLORIDE 50 MG/ML
12.5 INJECTION INTRAMUSCULAR; INTRAVENOUS EVERY 6 HOURS PRN
Status: DISCONTINUED | OUTPATIENT
Start: 2024-11-05 | End: 2024-11-05 | Stop reason: HOSPADM

## 2024-11-05 RX ORDER — TIZANIDINE 2 MG/1
2-4 TABLET ORAL
COMMUNITY

## 2024-11-05 RX ORDER — OXYCODONE HYDROCHLORIDE 5 MG/1
5 TABLET ORAL EVERY 4 HOURS PRN
Status: DISCONTINUED | OUTPATIENT
Start: 2024-11-05 | End: 2024-11-05

## 2024-11-05 RX ORDER — CEFAZOLIN SODIUM/WATER 2 G/20 ML
2 SYRINGE (ML) INTRAVENOUS
Status: COMPLETED | OUTPATIENT
Start: 2024-11-05 | End: 2024-11-05

## 2024-11-05 RX ORDER — SODIUM CHLORIDE, SODIUM LACTATE, POTASSIUM CHLORIDE, CALCIUM CHLORIDE 600; 310; 30; 20 MG/100ML; MG/100ML; MG/100ML; MG/100ML
INJECTION, SOLUTION INTRAVENOUS CONTINUOUS
Status: DISCONTINUED | OUTPATIENT
Start: 2024-11-05 | End: 2024-11-06

## 2024-11-05 RX ORDER — KETOROLAC TROMETHAMINE 15 MG/ML
15 INJECTION, SOLUTION INTRAMUSCULAR; INTRAVENOUS EVERY 6 HOURS
Status: COMPLETED | OUTPATIENT
Start: 2024-11-05 | End: 2024-11-06

## 2024-11-05 RX ORDER — CEFAZOLIN SODIUM 2 G/100ML
2 INJECTION, SOLUTION INTRAVENOUS EVERY 8 HOURS
Status: DISCONTINUED | OUTPATIENT
Start: 2024-11-05 | End: 2024-11-06

## 2024-11-05 RX ORDER — SODIUM CHLORIDE 9 MG/ML
INJECTION, SOLUTION INTRAVENOUS CONTINUOUS PRN
Status: DISCONTINUED | OUTPATIENT
Start: 2024-11-05 | End: 2024-11-05

## 2024-11-05 RX ORDER — GLYCOPYRROLATE 0.2 MG/ML
INJECTION, SOLUTION INTRAMUSCULAR; INTRAVENOUS PRN
Status: DISCONTINUED | OUTPATIENT
Start: 2024-11-05 | End: 2024-11-05

## 2024-11-05 RX ORDER — PROCHLORPERAZINE MALEATE 5 MG/1
5 TABLET ORAL EVERY 6 HOURS PRN
Status: DISCONTINUED | OUTPATIENT
Start: 2024-11-05 | End: 2024-11-08 | Stop reason: HOSPADM

## 2024-11-05 RX ORDER — ONDANSETRON 2 MG/ML
4 INJECTION INTRAMUSCULAR; INTRAVENOUS EVERY 6 HOURS PRN
Status: DISCONTINUED | OUTPATIENT
Start: 2024-11-05 | End: 2024-11-08 | Stop reason: HOSPADM

## 2024-11-05 RX ORDER — CELECOXIB 200 MG/1
200 CAPSULE ORAL
Status: DISCONTINUED | OUTPATIENT
Start: 2024-11-05 | End: 2024-11-06

## 2024-11-05 RX ORDER — AMOXICILLIN 250 MG
1 CAPSULE ORAL 2 TIMES DAILY
Status: DISCONTINUED | OUTPATIENT
Start: 2024-11-05 | End: 2024-11-08 | Stop reason: HOSPADM

## 2024-11-05 RX ORDER — DIPHENHYDRAMINE HCL 12.5 MG/5ML
12.5 SOLUTION ORAL EVERY 6 HOURS PRN
Status: DISCONTINUED | OUTPATIENT
Start: 2024-11-05 | End: 2024-11-05 | Stop reason: HOSPADM

## 2024-11-05 RX ORDER — ONDANSETRON 2 MG/ML
4 INJECTION INTRAMUSCULAR; INTRAVENOUS EVERY 30 MIN PRN
Status: DISCONTINUED | OUTPATIENT
Start: 2024-11-05 | End: 2024-11-05 | Stop reason: HOSPADM

## 2024-11-05 RX ORDER — TOBRAMYCIN 1.2 G/30ML
2.4 INJECTION, POWDER, LYOPHILIZED, FOR SOLUTION INTRAVENOUS ONCE
Status: COMPLETED | OUTPATIENT
Start: 2024-11-05 | End: 2024-11-05

## 2024-11-05 RX ORDER — BISACODYL 10 MG
10 SUPPOSITORY, RECTAL RECTAL DAILY PRN
Status: DISCONTINUED | OUTPATIENT
Start: 2024-11-05 | End: 2024-11-08 | Stop reason: HOSPADM

## 2024-11-05 RX ORDER — MAGNESIUM HYDROXIDE/ALUMINUM HYDROXICE/SIMETHICONE 120; 1200; 1200 MG/30ML; MG/30ML; MG/30ML
30 SUSPENSION ORAL EVERY 4 HOURS PRN
Status: DISCONTINUED | OUTPATIENT
Start: 2024-11-05 | End: 2024-11-08 | Stop reason: HOSPADM

## 2024-11-05 RX ORDER — CEFAZOLIN SODIUM/WATER 2 G/20 ML
2 SYRINGE (ML) INTRAVENOUS SEE ADMIN INSTRUCTIONS
Status: DISCONTINUED | OUTPATIENT
Start: 2024-11-05 | End: 2024-11-05 | Stop reason: HOSPADM

## 2024-11-05 RX ORDER — LIDOCAINE 50 MG/G
OINTMENT TOPICAL 4 TIMES DAILY
Status: DISCONTINUED | OUTPATIENT
Start: 2024-11-05 | End: 2024-11-08 | Stop reason: HOSPADM

## 2024-11-05 RX ORDER — ONDANSETRON 4 MG/1
4 TABLET, ORALLY DISINTEGRATING ORAL EVERY 6 HOURS PRN
Status: DISCONTINUED | OUTPATIENT
Start: 2024-11-05 | End: 2024-11-08 | Stop reason: HOSPADM

## 2024-11-05 RX ADMIN — FENTANYL CITRATE 50 MCG: 0.05 INJECTION, SOLUTION INTRAMUSCULAR; INTRAVENOUS at 10:13

## 2024-11-05 RX ADMIN — FENTANYL CITRATE 25 MCG: 50 INJECTION INTRAMUSCULAR; INTRAVENOUS at 11:36

## 2024-11-05 RX ADMIN — OXYCODONE 10 MG: 5 TABLET ORAL at 16:33

## 2024-11-05 RX ADMIN — ASPIRIN 81 MG: 81 TABLET, COATED ORAL at 13:43

## 2024-11-05 RX ADMIN — MIDAZOLAM 1 MG: 1 INJECTION INTRAMUSCULAR; INTRAVENOUS at 07:23

## 2024-11-05 RX ADMIN — HYDROXYZINE HYDROCHLORIDE 50 MG: 25 TABLET, FILM COATED ORAL at 20:35

## 2024-11-05 RX ADMIN — DEXMEDETOMIDINE HYDROCHLORIDE 8 MCG: 100 INJECTION, SOLUTION INTRAVENOUS at 08:43

## 2024-11-05 RX ADMIN — SODIUM CHLORIDE, POTASSIUM CHLORIDE, SODIUM LACTATE AND CALCIUM CHLORIDE: 600; 310; 30; 20 INJECTION, SOLUTION INTRAVENOUS at 07:22

## 2024-11-05 RX ADMIN — TIZANIDINE 4 MG: 4 TABLET ORAL at 18:20

## 2024-11-05 RX ADMIN — DEXMEDETOMIDINE HYDROCHLORIDE 8 MCG: 100 INJECTION, SOLUTION INTRAVENOUS at 08:46

## 2024-11-05 RX ADMIN — KETAMINE HYDROCHLORIDE 30 MG: 10 INJECTION INTRAMUSCULAR; INTRAVENOUS at 08:48

## 2024-11-05 RX ADMIN — GABAPENTIN 600 MG: 300 CAPSULE ORAL at 20:35

## 2024-11-05 RX ADMIN — Medication 2 G: at 07:23

## 2024-11-05 RX ADMIN — DEXAMETHASONE SODIUM PHOSPHATE 10 MG: 10 INJECTION, SOLUTION INTRAMUSCULAR; INTRAVENOUS at 07:38

## 2024-11-05 RX ADMIN — FENTANYL CITRATE 50 MCG: 50 INJECTION INTRAMUSCULAR; INTRAVENOUS at 11:22

## 2024-11-05 RX ADMIN — HYDROXYZINE HYDROCHLORIDE 50 MG: 25 TABLET, FILM COATED ORAL at 13:15

## 2024-11-05 RX ADMIN — METHOCARBAMOL TABLETS 750 MG: 750 TABLET, COATED ORAL at 13:43

## 2024-11-05 RX ADMIN — SENNOSIDES AND DOCUSATE SODIUM 1 TABLET: 50; 8.6 TABLET ORAL at 19:10

## 2024-11-05 RX ADMIN — ALBUMIN (HUMAN): 12.5 SOLUTION INTRAVENOUS at 10:24

## 2024-11-05 RX ADMIN — FENTANYL CITRATE 50 MCG: 0.05 INJECTION, SOLUTION INTRAMUSCULAR; INTRAVENOUS at 07:31

## 2024-11-05 RX ADMIN — PHENYLEPHRINE HYDROCHLORIDE 0.5 MCG/KG/MIN: 10 INJECTION INTRAVENOUS at 07:43

## 2024-11-05 RX ADMIN — DEXMEDETOMIDINE HYDROCHLORIDE 4 MCG: 100 INJECTION, SOLUTION INTRAVENOUS at 09:42

## 2024-11-05 RX ADMIN — LIDOCAINE: 50 OINTMENT TOPICAL at 16:37

## 2024-11-05 RX ADMIN — Medication 1500 MG: at 16:43

## 2024-11-05 RX ADMIN — FENTANYL CITRATE 50 MCG: 50 INJECTION INTRAMUSCULAR; INTRAVENOUS at 12:01

## 2024-11-05 RX ADMIN — Medication 10 MG: at 08:58

## 2024-11-05 RX ADMIN — BUPIVACAINE HYDROCHLORIDE 3 ML: 5 INJECTION, SOLUTION EPIDURAL; INTRACAUDAL; PERINEURAL at 07:34

## 2024-11-05 RX ADMIN — CEFAZOLIN SODIUM 2 G: 2 INJECTION, SOLUTION INTRAVENOUS at 19:10

## 2024-11-05 RX ADMIN — Medication 5 MG: at 09:19

## 2024-11-05 RX ADMIN — ACETAMINOPHEN 975 MG: 325 TABLET ORAL at 19:10

## 2024-11-05 RX ADMIN — FENTANYL CITRATE 50 MCG: 0.05 INJECTION, SOLUTION INTRAMUSCULAR; INTRAVENOUS at 07:29

## 2024-11-05 RX ADMIN — KETOROLAC TROMETHAMINE 30 MG: 30 INJECTION, SOLUTION INTRAMUSCULAR at 10:13

## 2024-11-05 RX ADMIN — SODIUM CHLORIDE: 0.9 INJECTION, SOLUTION INTRAVENOUS at 09:38

## 2024-11-05 RX ADMIN — ASPIRIN 81 MG: 81 TABLET, COATED ORAL at 19:10

## 2024-11-05 RX ADMIN — METHOCARBAMOL TABLETS 750 MG: 750 TABLET, COATED ORAL at 16:33

## 2024-11-05 RX ADMIN — ACETAMINOPHEN 975 MG: 325 TABLET ORAL at 05:59

## 2024-11-05 RX ADMIN — KETOROLAC TROMETHAMINE 15 MG: 15 INJECTION, SOLUTION INTRAMUSCULAR; INTRAVENOUS at 18:19

## 2024-11-05 RX ADMIN — DEXMEDETOMIDINE HYDROCHLORIDE 4 MCG: 100 INJECTION, SOLUTION INTRAVENOUS at 09:37

## 2024-11-05 RX ADMIN — GLYCOPYRROLATE 0.2 MG: 0.2 INJECTION INTRAMUSCULAR; INTRAVENOUS at 08:56

## 2024-11-05 RX ADMIN — OXYCODONE 10 MG: 5 TABLET ORAL at 20:35

## 2024-11-05 RX ADMIN — METOCLOPRAMIDE HYDROCHLORIDE 10 MG: 5 INJECTION INTRAMUSCULAR; INTRAVENOUS at 09:08

## 2024-11-05 RX ADMIN — SODIUM CHLORIDE, POTASSIUM CHLORIDE, SODIUM LACTATE AND CALCIUM CHLORIDE: 600; 310; 30; 20 INJECTION, SOLUTION INTRAVENOUS at 13:52

## 2024-11-05 RX ADMIN — FENTANYL CITRATE 50 MCG: 0.05 INJECTION, SOLUTION INTRAMUSCULAR; INTRAVENOUS at 09:43

## 2024-11-05 RX ADMIN — MIDAZOLAM 1 MG: 1 INJECTION INTRAMUSCULAR; INTRAVENOUS at 07:29

## 2024-11-05 RX ADMIN — ACETAMINOPHEN 975 MG: 325 TABLET ORAL at 13:43

## 2024-11-05 RX ADMIN — FENTANYL CITRATE 25 MCG: 50 INJECTION INTRAMUSCULAR; INTRAVENOUS at 11:51

## 2024-11-05 RX ADMIN — ONDANSETRON 4 MG: 2 INJECTION INTRAMUSCULAR; INTRAVENOUS at 08:07

## 2024-11-05 RX ADMIN — PROPOFOL 100 MCG/KG/MIN: 10 INJECTION, EMULSION INTRAVENOUS at 07:38

## 2024-11-05 RX ADMIN — TRANEXAMIC ACID 1950 MG: 650 TABLET ORAL at 05:59

## 2024-11-05 RX ADMIN — OXYCODONE 10 MG: 5 TABLET ORAL at 13:14

## 2024-11-05 RX ADMIN — LIDOCAINE HYDROCHLORIDE 50 MG: 10 INJECTION, SOLUTION EPIDURAL; INFILTRATION; INTRACAUDAL; PERINEURAL at 07:36

## 2024-11-05 RX ADMIN — KETAMINE HYDROCHLORIDE 20 MG: 10 INJECTION INTRAMUSCULAR; INTRAVENOUS at 09:10

## 2024-11-05 ASSESSMENT — ACTIVITIES OF DAILY LIVING (ADL)
ADLS_ACUITY_SCORE: 0

## 2024-11-05 NOTE — PROGRESS NOTES
11/05/24 1550   Appointment Info   Signing Clinician's Name / Credentials (PT) Dao Mathis, OTF   Quick Adds   Quick Adds Certification   Living Environment   People in Home child(roshan), adult   Current Living Arrangements house   Home Accessibility stairs to enter home;stairs within home   Number of Stairs, Main Entrance 4   Stair Railings, Main Entrance railings safe and in good condition   Number of Stairs, Within Home, Primary none   Living Environment Comments can stay on main level   Self-Care   Usual Activity Tolerance moderate   Current Activity Tolerance poor   Equipment Currently Used at Home cane, straight;walker, rolling   Fall history within last six months no   Activity/Exercise/Self-Care Comment Indep with  ADL's, daughter assists with I ADL's   General Information   Onset of Illness/Injury or Date of Surgery 11/05/24   Pertinent History of Current Problem (include personal factors and/or comorbidities that impact the POC) s/p  RTHA   Existing Precautions/Restrictions no hip IR;no hip ER;no active hip ABD;no hip ADD past midline;no hip hyperextension;90 degree hip flexion;no pivoting or twisting   Weight-Bearing Status - RLE weight-bearing as tolerated   Cognition   Affect/Mental Status (Cognition) WFL   Range of Motion (ROM)   ROM Comment decreased ROM s/p R MISSY   Strength (Manual Muscle Testing)   Strength Comments general weakness   Transfers   Transfers sit-stand transfer   Sit-Stand Transfer   Sit-Stand [place] (Transfers) contact guard;verbal cues   Assistive Device (Sit-Stand Transfers) walker, front-wheeled   Gait/Stairs (Locomotion)   [place] Level (Gait) verbal cues;contact guard   Assistive Device (Gait) walker, front-wheeled   Distance in Feet (Gait) 15   Pattern (Gait) step-to   Deviations/Abnormal Patterns (Gait) antalgic;gait speed decreased;stride length decreased   Maintains Weight-bearing Status (Gait) able to maintain   Balance   Balance no deficits were identified    Clinical Impression   Criteria for Skilled Therapeutic Intervention Yes, treatment indicated   PT Diagnosis (PT) impaired functional mobility   Influenced by the following impairments pain, decreased ROM, impaired balance, decreased strength   Functional limitations due to impairments gait, stairs, transfers   Clinical Presentation (PT Evaluation Complexity) stable   Clinical Presentation Rationale pt presents as medically diagnosed   Clinical Decision Making (Complexity) moderate complexity   Planned Therapy Interventions (PT) gait training;home exercise program;patient/family education;stair training;transfer training   Risk & Benefits of therapy have been explained care plan/treatment goals reviewed;patient;daughter   PT Total Evaluation Time   PT Eval, Low Complexity Minutes (51257) 10   Therapy Certification   Start of care date 11/05/24   Certification date from 11/05/24   Certification date to 12/05/24   Physical Therapy Goals   PT Frequency Daily   PT Predicted Duration/Target Date for Goal Attainment 11/12/24   PT Goals PT Goal 1;Transfers;Gait;Stairs   PT: Transfers Supervision/stand-by assist;Sit to/from stand;Assistive device;Within precautions   PT: Gait Supervision/stand-by assist;Rolling walker;Within precautions;50 feet   PT: Stairs 4 stairs;Rail on both sides;Within precautions;Minimal assist   PT: Goal 1 Independent with written HEP for LE strengthening and ROM   Interventions   Interventions Quick Adds Therapeutic Procedure;Therapeutic Activity;Gait Training   Therapeutic Procedure/Exercise   Ther. Procedure: strength, endurance, ROM, flexibillity Minutes (13916) 15   Symptoms Noted During/After Treatment increased pain   Treatment Detail/Skilled Intervention TKA protocol therex x10 reps, cueing for technique,   extra  time needed due to patient pain level   Therapeutic Activity   Therapeutic Activities: dynamic activities to improve functional performance Minutes (11874) 8   Symptoms Noted  During/After Treatment Increased pain   Treatment Detail/Skilled Intervention sit to/from stand, cueing for safe hand placement and LE positioning,Educated on hip precautions, positioning in chair as patient having high level of pain and difficult to get in comfortable spot despite reclining chair, raising/lowering leg rest   Gait Training   Assistive Device (Gait Training) rolling walker   PT Discharge Planning   PT Plan progress with gait/transfers as able, review MISYS hep, review precautions   PT Discharge Recommendation (DC Rec) other (see comments)  (per ortho MD)   PT Rationale for DC Rec Paitent needing cga for gait/transfer, has good home setup and support   PT Brief overview of current status Patient cga with gait/transfers and only tolerating 10-15' for distance due to pain   PT Equipment Needed at Discharge cane, straight;walker, rolling   Physical Therapy Time and Intention   Timed Code Treatment Minutes 23   Total Session Time (sum of timed and untimed services) 33   [facility]  OUTPATIENT PHYSICAL THERAPY EVALUATION  PLAN OF TREATMENT FOR OUTPATIENT REHABILITATION  (COMPLETE FOR INITIAL CLAIMS ONLY)  Patient's Last Name, First Name, M.I.  YOB: 1960  Rosemary Kay                        Provider's Name  [facility] Medical Record No.  [id number]                             Onset Date:  11/05/24   Start of Care Date:  11/05/24   Type:     _X_PT   ___OT   ___SLP Medical Diagnosis:                 PT Diagnosis:  impaired functional mobility Visits from SOC:  1     See note for plan of treatment, functional goals and certification details    I CERTIFY THE NEED FOR THESE SERVICES FURNISHED UNDER        THIS PLAN OF TREATMENT AND WHILE UNDER MY CARE     (Physician co-signature of this document indicates review and certification of the therapy plan).

## 2024-11-05 NOTE — PLAN OF CARE
Problem: Adult Inpatient Plan of Care  Goal: Absence of Hospital-Acquired Illness or Injury  Intervention: Identify and Manage Fall Risk  Recent Flowsheet Documentation  Taken 11/5/2024 1630 by Dana Rodriguez RN  Safety Promotion/Fall Prevention:   safety round/check completed   room near nurse's station   room door open     Problem: Adult Inpatient Plan of Care  Goal: Absence of Hospital-Acquired Illness or Injury  Intervention: Prevent Skin Injury  Recent Flowsheet Documentation  Taken 11/5/2024 1630 by Dana Rodriguez RN  Body Position:   position changed independently   weight shifting   Goal Outcome Evaluation:      Plan of Care Reviewed With: patient    Overall Patient Progress: improvingOverall Patient Progress: improving  Patient vital signs are at baseline: Yes  Patient able to ambulate as they were prior to admission or with assist devices provided by therapies during their stay:  No,  Reason:  due to ambulate  Patient MUST void prior to discharge:  Yes  Patient able to tolerate oral intake:  Yes  Pain has adequate pain control using Oral analgesics:  Yes  Does patient have an identified :  Yes  Has goal D/C date and time been discussed with patient:  Yes   Pt alert and oriented, vss on 2L, can voice needs, very painful yet allergic to most pain meds, utilising ice packs, elevation . Pt swearing alot due to pain, provider informed and  new orders put in place.  Pt has voided and ambulated to the bathroom.

## 2024-11-05 NOTE — ANESTHESIA PROCEDURE NOTES
"Intrathecal injection Procedure Note    Pre-Procedure   Staff -        Anesthesiologist:  Joss Ovalle MD       Performed By: anesthesiologist       Location: OR       Procedure Start/Stop Times: 11/5/2024 7:30 AM and 11/5/2024 7:34 AM       Pre-Anesthestic Checklist: patient identified, IV checked, risks and benefits discussed, informed consent, monitors and equipment checked and pre-op evaluation  Timeout:       Correct Patient: Yes        Correct Procedure: Yes        Correct Site: Yes        Correct Position: Yes   Procedure Documentation  Procedure: intrathecal injection       Patient Position: sitting       Patient Prep/Sterile Barriers: sterile gloves, mask, patient draped       Skin prep: Chloraprep       Insertion Site: L3-4. (midline approach).       Needle Gauge: 24.        Needle Length (Inches): 4        Spinal Needle Type: Pencan       Introducer used       Introducer: 20 G       # of attempts: 1 and  # of redirects:  2  Medication(s) Administered   0.5% Bupivacaine PF (Intrathecal) - Intrathecal   3 mL - 11/5/2024 7:34:00 AM  Medication Administration Time: 11/5/2024 7:30 AM      FOR [facility] ([facility]) ONLY:   Pain Team Contact information: please page the Pain Team Via [employer]. Search \"Pain\". During daytime hours, please page the attending first. At night please page the resident first.      "

## 2024-11-05 NOTE — ANESTHESIA CARE TRANSFER NOTE
Patient: Rosemary Kay    Procedure: Procedure(s):  RIGHT TOTAL HIP ARTHROPLASTY POSTERIOR REVISION BOTH COMPONENTS       Diagnosis: Femoral loosening of prosthetic right hip, initial encounter (H) [T84.030A]  Loosening of prosthetic hip (H) [T84.038A, Z96.649]  Diagnosis Additional Information: No value filed.    Anesthesia Type:   Spinal     Note:    Oropharynx: oropharynx clear of all foreign objects and spontaneously breathing  Level of Consciousness: drowsy  Oxygen Supplementation: face mask  Level of Supplemental Oxygen (L/min / FiO2): 8  Independent Airway: airway patency satisfactory and stable  Dentition: dentition unchanged  Vital Signs Stable: post-procedure vital signs reviewed and stable  Report to RN Given: handoff report given  Patient transferred to: PACU    Handoff Report: Identifed the Patient, Identified the Reponsible Provider, Reviewed the pertinent medical history, Discussed the surgical course, Reviewed Intra-OP anesthesia mangement and issues during anesthesia, Set expectations for post-procedure period and Allowed opportunity for questions and acknowledgement of understanding  Vitals:  Vitals Value Taken Time   /79 11/05/24 1051   Temp 35.5  C (95.9  F) 11/05/24 1053   Pulse 78 11/05/24 1053   Resp 13 11/05/24 1053   SpO2 97 % 11/05/24 1053   Vitals shown include unfiled device data.    Electronically Signed By: JAYLYN Guevara CRNA  November 5, 2024  10:55 AM

## 2024-11-05 NOTE — CONSULTS
[facility] MEDICINE CONSULT NOTE   Physician requesting consult: Rios Sanches MD    Reason for consult: Postoperative medical management of medical co-morbidities as below    Identification/Summary:   Rosemary Kay is a 64 year old female with a PMH of RCC, breast cancer, JOHNNIE, CAITLYN, bipolar disorder, GERD, loosening prosthetic R hip. Underwent R hip arthroplasty on 11/5/2024.    Assessment and Plan:  Purulent R hip joint effusion  She was given the following abx in the OR  Ancef 2  g IVPB on induction  Prostalac antibiotic spacer: 3.6 g tobramycin, 1 g vancomycin  Cemented acetabular polyliner: 3.6 g tobramycin, 1 g vancomycin  Culture is sent  ID is consulted  She is continued on cefazolin and vancomycin, will defer to ID  I ordered morphine 2mg IV and benadryl for her acute pain once stat    JOHNNIE She does not use CPAP PTA  Hypertriglyceridemia not on meds  Obesity   GERD: On omeprazole.   Bipolar 1 Disorder and Anxiety Not currently on meds  History of Left Breast Cancer (Invasive Ductal Carcinoma): S/P Left Lumpectomy and Radiation Treatment in 2016     Anticoagulation.  Deferred to surgery      Code status:Full Code   [facility] service was asked to evaluate patient for postoperative medical management as follows below. Please resume the home medications as reconciled and further noted with ordered hold parameters.  Thank you for this consult; we will continue to follow this patient until discharge.    Procedure(s):  RIGHT TOTAL HIP ARTHROPLASTY POSTERIOR REVISION BOTH COMPONENTS  Day of Surgery  Estimated Blood Loss:  550 mL  Hospital Problem List   No problem-specific Assessment & Plan notes found for this encounter.    Active Problems:    S/P revision of total hip      -Reviewed the patient's preoperative H and P and updated missing elements.  -Home medication reconciliation has been reviewed.  Medications have been ordered as noted from the home list and changes are documented above  "    Clinically Significant Risk Factors Present on Admission                             # Obesity: Estimated body mass index is 39 kg/m  as calculated from the following:    Height as of this encounter: 1.702 m (5' 7\").    Weight as of this encounter: 112.9 kg (249 lb).              HISTORY     Rosemary Kay is a 64 year old female with a PMH of RCC, breast cancer, JOHNNIE, CAITLYN, bipolar disorder, GERD, loosening prosthetic R hip. Underwent R hip arthroplasty on 11/5/2024.    She is very painful from R hip. She denies chest pain, dyspnea or nausea.      Past Medical History     Past Medical History:  No date: Anemia  No date: Arthritis  No date: Gastroesophageal reflux disease  No date: Liver disease  No date: Obese  No date: Sleep apnea     Patient Active Problem List    Diagnosis Date Noted    S/P revision of total hip 11/05/2024     Priority: Medium    Hematoma      Priority: Medium    Osteoarthritis 06/19/2015     Priority: Medium    Bipolar I disorder, most recent episode (or current) mixed, moderate 02/13/2015     Priority: Medium     Replacement Utility updated for latest IMO load        Cannabis dependence, unspecified 02/13/2015     Priority: Medium     Replacement Utility updated for latest IMO load        Arthritis 02/13/2015     Priority: Medium    GERD (gastroesophageal reflux disease) 02/13/2015     Priority: Medium     Surgical History     Past Surgical History:   Procedure Laterality Date    BIOPSY BREAST Left 9/13/2016    Procedure: Evacuation of Hematoma Left Breast;  Surgeon: Halina Pagan MD;  Location: [facility];  Service:     JOINT REPLACEMENT Left     KIDNEY SURGERY      mass excision on the left    LUMPECTOMY BREAST Left 09/07/2016    DR. PAGAN    LUMPECTOMY BREAST Left 09/07/2016    Dr. Pagan     Family History      Family History   Problem Relation Age of Onset    Heart Failure Mother     Hypertension Father     Cerebrovascular Disease Father     Breast Cancer Sister     " "Anesthesia Reaction No family hx of       Social History      Social History     Tobacco Use    Smoking status: Former    Smokeless tobacco: Never    Tobacco comments:     smoke pot; no cigarettes   Substance Use Topics    Alcohol use: Yes     Comment: 3-4 per week    Drug use: Not Currently     Types: Marijuana      Allergies     Allergies   Allergen Reactions    Codeine Unknown     Feels like skin is crawling but can take with benadryl.    Demerol [Meperidine] Unknown     Feels like skin is crawling but can take with benadryl.    Dilaudid [Hydromorphone] Unknown     Pt \"Turns into a monster and doesn't even know who she is\"    Iodinated Contrast Media [Iodinated Contrast Media] Hives    Latex Unknown     condoms    Morphine Unknown     Feels like skin is crawling but can take with benadryl.    Percocet [Oxycodone-Acetaminophen]      Itchy, irritable    Tramadol Itching     Itching but can take with benadryl    Vicodin [Hydrocodone-Acetaminophen] Unknown     Itchy, irritable       Prior to Admission Medications      Prior to Admission Medications   Prescriptions Last Dose Informant Patient Reported? Taking?   celecoxib (CELEBREX) 200 MG capsule   Yes Yes   Sig: Take 200 mg by mouth daily.   gabapentin (NEURONTIN) 300 MG capsule   Yes No   Sig: [GABAPENTIN (NEURONTIN) 300 MG CAPSULE] Take 600 mg by mouth bedtime.      Facility-Administered Medications: None      Review of Systems     A 12 point comprehensive review of systems was negative except as noted above in HPI.    OBJECTIVE         Physical Exam   Temp:  [96.3  F (35.7  C)-97.8  F (36.6  C)] 97.8  F (36.6  C)  Pulse:  [62-81] 74  Resp:  [15-33] 15  BP: ()/(50-80) 128/78  SpO2:  [92 %-99 %] 99 %  Body mass index is 39 kg/m .  General Appearance: Alert and wake, in moderate distress due to pain  Respiratory: clear lungs, no crackles or wheezing  Cardiovascular: rhythmic, normal S1 and S2, no murmur  Neurology: oriented x 3  Psych: cooperative and calm, " normal affect    I reviewed patient's preoperative evaluation note on 10/25/2024 by Estela MENDEZ.  I reviewed patient's preoperative lab tests on 9/12/2024      Thank you for this consultation.  Appreciate the opportunity to participate in the care of Rosemary Kay, please feel free to contact us for any questions or concerns.    ANAIS CABALLERO MD  [facility]  Phone: #[phone]

## 2024-11-05 NOTE — PHARMACY-VANCOMYCIN DOSING SERVICE
Pharmacy Vancomycin Initial Note  Date of Service 2024  Patient's  1960  64 year old, female    Indication: Postoperative Infection    Current estimated CrCl = Estimated Creatinine Clearance: 115.4 mL/min (based on SCr of 0.63 mg/dL).    Creatinine for last 3 days  2024:  6:23 AM Creatinine 0.63 mg/dL    Recent Vancomycin Level(s) for last 3 days  No results found for requested labs within last 3 days.      Vancomycin IV Administrations (past 72 hours)        No vancomycin orders with administrations in past 72 hours.                    Nephrotoxins and other renal medications (From now, onward)      Start     Dose/Rate Route Frequency Ordered Stop    24 1600  vancomycin (VANCOCIN) 1,500 mg in 0.9% NaCl 250 mL intermittent infusion         1,500 mg  over 90 Minutes Intravenous EVERY 12 HOURS 24 1510              Contrast Orders - past 72 hours (72h ago, onward)      None            Insight[employer] Prediction of Planned Initial Vancomycin Regimen   Loading dose: N/A  Regimen: 1500 mg IV every 12 hours.  Start time: 16:25 on 2024  Exposure target: AUC24 (range)400-600 mg/L.hr   AUC24,ss: 543 mg/L.hr  Probability of AUC24 > 400: 81 %  Ctrough,ss: 17.1 mg/L  Probability of Ctrough,ss > 20: 37 %  Probability of nephrotoxicity (Lodise PAUL ): 13 %       Plan:  Start vancomycin  1500 mg IV q24h.   Vancomycin monitoring method: AUC  Vancomycin therapeutic monitoring goal: 400-600 mg*h/L  Pharmacy will check vancomycin levels as appropriate in 1-3 Days.    Serum creatinine levels will be ordered daily for the first week of therapy and at least twice weekly for subsequent weeks.      Michelle Newman, PharmD

## 2024-11-05 NOTE — CONSULTS
[facility] ACUTE PAIN SERVICE CONSULTATION   [facility], [facility], [facility], [facility], [place]     Date of Admission:  11/5/2024  Date of Consult (When I saw the patient): 11/05/24     Assessment/Plan:     Rosemary Kay is a 64 year old female who was admitted on 11/5/2024.  Pain team was asked to see the patient for post op pain, multiple opioid allergies/intolerances. Admitted for planned procedure. History of RCC, breast cancer, JOHNNIE, CAITLYN, bipolar disorder not currently on medications, GERD, loosening prosthetic R hip.  Describes pain as 10/10 and aching, throbbing, sharp in the R hip.     Post op day: Day of Surgery. R hip arthroplasty on 11/5/2024     Allergies/intolerance: Codeine (feels like skin is crawling but can take with Benadryl), Demerol (feels like skin is crawling but can take with Benadryl), morphine (feels like skin is crawling but can take with Benadryl, irritable), Percocet (itching, irritable), tramadol (itching but can take with Benadryl), Norco (itching, irritable), Toradol (feels like skin is crawling)    PLAN:   1) Pain is consistent with post op pain     Multimodal Medication Therapy  Topical: Lidocaine ointment 4 times daily  NSAID'S: Estimated Creatinine Clearance: 115.4 mL/min (based on SCr of 0.63 mg/dL).  Celebrex 200 mg daily with lunch.  Discontinue Toradol allergies noted  Steroids: None  Muscle Relaxants: Robaxin 750 mg 4 times daily  Adjuvants: Acetaminophen every 6 hours, gabapentin 600 mg at bedtime (home medication), hydroxyzine as needed  Opioids: Oxycodone 5-10 mg q3h prn   IV Pain medication: does not want morphine or dilaudid  Non-medication interventions: Ice, Rest, PT, OT, Distraction (TV, Music, Reading), Meditation, and Essential Oils  Constipation Prophylaxis: Bisacodyl Suppository, Senna-docusate, and Miralax    -Opioid prescriber has been none recent  -[place]  pulled from system on 11/5/2024. This indicates ongoing fills for gabapentin.  Had tramadol filled  in May 2024.  No other opioids other than tramadol filled in May.  Valium filled in January 2024  Discharge Recommendations - We recommend prescribing the following at the time of discharge: Per orthopedics     History of Present Illness (HPI):       Rosemary Kay is a 64 year old female who presented for planned procedure.  Past medical history as above. The pain is reported to be acute, chronic, located in the right hip, and it does not radiate. Current pain is rated at 10/10 and goal is 0-2/10.  The patient denies nausea, vomiting, constipation, diarrhea, chest pain, shortness of breath, dizziness, fever, and chills.      Per [place]  review, the patient does not have an opioid tolerance. Opioid induced side effects noted and include: itching, irritable     Reviewed medical record, labs, imaging, ED note, and care everywhere.     Past pain treatments have included: tramadol, Gabapentin       Medical History   PAST MEDICAL HISTORY:   Past Medical History:   Diagnosis Date    Anemia     Arthritis     Gastroesophageal reflux disease     Liver disease     Obese     Sleep apnea        PAST SURGICAL HISTORY:   Past Surgical History:   Procedure Laterality Date    BIOPSY BREAST Left 9/13/2016    Procedure: Evacuation of Hematoma Left Breast;  Surgeon: Halina Pagan MD;  Location: [facility] [place];  Service:     JOINT REPLACEMENT Left     KIDNEY SURGERY      mass excision on the left    LUMPECTOMY BREAST Left 09/07/2016    DR. PAGAN    LUMPECTOMY BREAST Left 09/07/2016    Dr. Pagan       FAMILY HISTORY:   Family History   Problem Relation Age of Onset    Heart Failure Mother     Hypertension Father     Cerebrovascular Disease Father     Breast Cancer Sister     Anesthesia Reaction No family hx of        SOCIAL HISTORY:   Social History     Tobacco Use    Smoking status: Former    Smokeless tobacco: Never    Tobacco comments:     smoke pot; no cigarettes   Substance Use Topics    Alcohol use: Yes     Comment: 3-4 per  "week        [facility]  Tobacco:  reports that she has quit smoking. She has never used smokeless tobacco.  Alcohol:  reports current alcohol use.  Illicit drugs:  reports that she does not currently use drugs after having used the following drugs: Marijuana.    Allergies  Allergies   Allergen Reactions    Codeine Unknown     Feels like skin is crawling but can take with benadryl.    Demerol [Meperidine] Unknown     Feels like skin is crawling but can take with benadryl.    Dilaudid [Hydromorphone] Unknown     Pt \"Turns into a monster and doesn't even know who she is\"    Iodinated Contrast Media [Iodinated Contrast Media] Hives    Latex Unknown     condoms    Morphine Unknown     Feels like skin is crawling but can take with benadryl.    Percocet [Oxycodone-Acetaminophen]      Itchy, irritable    Tramadol Itching     Itching but can take with benadryl    Vicodin [Hydrocodone-Acetaminophen] Unknown     Itchy, irritable       Problem List  Patient Active Problem List    Diagnosis Date Noted    S/P revision of total hip 11/05/2024     Priority: Medium    Hematoma      Priority: Medium    Osteoarthritis 06/19/2015     Priority: Medium    Bipolar I disorder, most recent episode (or current) mixed, moderate 02/13/2015     Priority: Medium     Replacement Utility updated for latest IMO load        Cannabis dependence, unspecified 02/13/2015     Priority: Medium     Replacement Utility updated for latest IMO load        Arthritis 02/13/2015     Priority: Medium    GERD (gastroesophageal reflux disease) 02/13/2015     Priority: Medium       Prior to Admission Medications   Medications Prior to Admission   Medication Sig Dispense Refill Last Dose/Taking    celecoxib (CELEBREX) 200 MG capsule Take 200 mg by mouth daily.   Taking    gabapentin (NEURONTIN) 300 MG capsule [GABAPENTIN (NEURONTIN) 300 MG CAPSULE] Take 600 mg by mouth bedtime.          Review of Systems  Complete ROS reviewed, unless noted in HPI, " "all other systems reviewed (with patient) and all others found to be negative.      Objective:     Physical Exam:  /78 (BP Location: Right arm)   Pulse 74   Temp 97.8  F (36.6  C) (Axillary)   Resp 15   Ht 1.702 m (5' 7\")   Wt 112.9 kg (249 lb)   SpO2 99%   BMI 39.00 kg/m    Weight:   Vitals:    11/04/24 1700 11/05/24 0554   Weight: 112 kg (247 lb) 112.9 kg (249 lb)      Body mass index is 39 kg/m .    General Appearance:  Alert, restless, anxious, daughter at bedside, in distress due to pain    Head:  Normocephalic, without obvious abnormality, atraumatic   Eyes:  PERRL, conjunctiva/corneas clear, EOM's intact   ENT/Throat: Lips, mucosa, and tongue normal; teeth and gums normal   Lymph/Neck: Supple, symmetrical, trachea midline   Lungs:   Clear to auscultation bilaterally, respirations unlabored   Chest Wall:  No tenderness or deformity   Cardiovascular/Heart:  Regular rate and rhythm, S1, S2 normal   Abdomen:   Soft, non-tender   Musculoskeletal: Incision is covered    Skin: Skin warm, dry    Neurologic: Alert and oriented X 3, Moves all 4 extremities     Psych: Affect is anxious     Imaging: Reviewed I have personally reviewed pertinent notes, labs, tests, and radiologic imaging in patient's chart.  Labs: Reviewed I have personally reviewed pertinent notes, labs, tests, and radiologic imaging in patient's chart.  Notes: Reviewed I have personally reviewed pertinent notes, labs, tests, and radiologic imaging in patient's chart.    Total time spent 65 minutes with greater than 50% in consultation, education and coordination of care.   Also discussed with RN.   Treatment plan includes: multimodal pain approach, Hospital Medicine Service for medical management, Orthopedics, PT, OT.   Patient educated regarding: multimodal pain approach and medications as listed above.   Elements of Medical Decision Making as described above. Acute or chronic illness or injury or surgery. High risk therapy including " opioids, high risk drug therapy including oral and/or parenteral controlled substances.    Patient is understanding of the plan. All questions and concerns addressed to patient's satisfaction.     Thank you for this consultation.    LAUREN GoelP-C  Acute Care Pain Management Program   [facility]   Monday-Friday 8a-4p   Page via Epic or Cegal

## 2024-11-05 NOTE — INTERVAL H&P NOTE
"I have reviewed the surgical (or preoperative) H&P that is linked to this encounter, and examined the patient. There are no significant changes    Clinical Conditions Present on Arrival:  Clinically Significant Risk Factors Present on Admission                       # Obesity: Estimated body mass index is 38.11 kg/m  as calculated from the following:    Height as of this encounter: 1.715 m (5' 7.5\").    Weight as of this encounter: 112 kg (247 lb).       "

## 2024-11-05 NOTE — PLAN OF CARE
Goal Outcome Evaluation:    Problem: Adult Inpatient Plan of Care  Goal: Optimal Comfort and Wellbeing  Outcome: Not Progressing  Intervention: Monitor Pain and Promote Comfort  Recent Flowsheet Documentation  Taken 11/5/2024 1314 by Inessa Bloom RN  Pain Management Interventions: medication (see MAR)  Taken 11/5/2024 1245 by Inessa Bloom RN  Pain Management Interventions: cold applied     Patient vital signs are at baseline: Yes 2L oxygen via nasal canula  Patient able to ambulate as they were prior to admission or with assist devices provided by therapies during their stay:  No,  Reason:  not oob yet  Patient MUST void prior to discharge:  No,  Reason:  due to void  Patient able to tolerate oral intake:  Yes   Pain has adequate pain control using Oral analgesics:  No,  Reason:  PRN oxy 10mg given w/atarax, not effective. Refused IV morphine d/t allergy.  Does patient have an identified :  Yes  Has goal D/C date and time been discussed with patient:  Yes

## 2024-11-05 NOTE — CONSULTS
INFECTIOUS DISEASE CONSULT NOTE    Date: 11/05/2024   CHIEF COMPLAINT: PJI       ================================================================  SUBJECTIVE    HPI  Ms. Kay is a 64o female with right hip PJI. PMH JOHNNIE, bipolar disorder, hx bilateral hip replacements.    She had initial right hip MISSY in 2015.  Had pain which developed fairly quickly after this case and has plagued her for years. In 2016 aspiration of the hip showed 100,000 WBCs (98% PMNs), culture negative.  Lost to follow-up when patient moved to [place]. Returned to [place] in 2024, around this time developed severe right hip pain.  Three-phase bone scan showed loose femoral component, inflammator markers elevated in August.  Taken for elective hardware extraction 11/5/24.  During the case gross purulence was encountered. Spacer was placed, tentative plan is is reimplantation in 12-16 weeks.    On interview patient confirms that symptoms have been ongoing for many months. No obvious systemic symptoms of inflammation. She is in severe pain post-operatively.    Past Medical History  Active Ambulatory Problems     Diagnosis Date Noted    Bipolar I disorder, most recent episode (or current) mixed, moderate 02/13/2015    Cannabis dependence, unspecified 02/13/2015    Arthritis 02/13/2015    GERD (gastroesophageal reflux disease) 02/13/2015    Osteoarthritis 06/19/2015    Hematoma      Resolved Ambulatory Problems     Diagnosis Date Noted    No Resolved Ambulatory Problems     Past Medical History:   Diagnosis Date    Anemia     Gastroesophageal reflux disease     Liver disease     Obese     Sleep apnea        Past Surgical History  She   has a past surgical history that includes joint replacement (Left); Kidney surgery; Lumpectomy breast (Left, 09/07/2016); Lumpectomy breast (Left, 09/07/2016); and Biopsy breast (Left, 9/13/2016).    Social History  she   reports that she has quit smoking. She has never used smokeless tobacco. She reports current alcohol  "use. She reports that she does not currently use drugs after having used the following drugs: Marijuana.    Family History  Reviewed and non-contributory  family history includes Breast Cancer in her sister; Cerebrovascular Disease in her father; Heart Failure in her mother; Hypertension in her father.    Social Needs  Social History     Social History Narrative    Not on file       ================================================================  OBJECTIVE  BP (!) 166/75 (BP Location: Right arm)   Pulse 70   Temp 97.8  F (36.6  C) (Oral)   Resp 20   Ht 1.702 m (5' 7\")   Wt 112.9 kg (249 lb)   SpO2 95%   BMI 39.00 kg/m      Physical Exam  General: alert, cooperative, no apparent distress  HEENT: atraumatic, normocephalic, no scleral icterus, moist mucus membranes, no cervical lymphadenopathy  Heart: Normal rate, regular rhythm, no murmurs  Lungs: CTAB, good inspiratory effort  Abdomen: soft, non-tender, non-distended  Extremities: no peripheral edema, no new rashes or lesions. Right hip s/p surgery    Pertinent labs  CBC RESULTS:   Recent Labs   Lab Test 11/05/24  0623   WBC 9.8   RBC 4.27   HGB 12.7   HCT 38.4   MCV 90   MCH 29.7   MCHC 33.1   RDW 13.9           Imaging  11/5 x-ray  IMPRESSION: Postoperative changes of a right total hip arthroplasty explantation with placement of an antibiotic spacer. Negative for postoperative purposes     Microbiology data  11/5/24 right hip (from OR): NGTD      I have personally reviewed the relevant laboratory, imaging, and microbiology data  ================================================================  Assessment  Ms. Kay is a 64o female with right hip PJI. PMH JOHNNIE, bipolar disorder, hx bilateral hip replacements.    She had initial right hip MISSY in 2015.  Per ortho note, in 2016 had right hip joint aspiration which showed 100,000 WBCs, but was lost to follow-up.  Based on her telling, she was never treated for what is presumably a chronic PJI. " Regardless, she is now s/p hardware explantation on 11/5, cultures pending. Tentative plan is for 2nd stage implantation in ~3 months.  For now will treat as culture-negative PJI.    Impression  # Chronic Right hip PJI, unknown organism   - Initial implantation 2015   - Infection likely smoldering for long time   - s/p total explantation 11/5/24   - Tentative plan for 2nd stage reimplantation in ~3 months    ================================================================  Plan  - Vancomycin, dosing per pharmacy  - Ceftriaxone 2g IV q24hr  - Trend cultures  - ID will follow    Dionisio Collado MD, MPH  [facility]   Office Telephone [phone].  Fax [phone]  [employer] paging

## 2024-11-05 NOTE — ANESTHESIA POSTPROCEDURE EVALUATION
Patient: Rosemary Kay    Procedure: Procedure(s):  RIGHT TOTAL HIP ARTHROPLASTY POSTERIOR REVISION BOTH COMPONENTS       Anesthesia Type:  Spinal    Note:  Disposition: Admission   Postop Pain Control: Uneventful            Sign Out: Well controlled pain   PONV: No   Neuro/Psych: Uneventful            Sign Out: Acceptable/Baseline neuro status   Airway/Respiratory: Uneventful            Sign Out: Acceptable/Baseline resp. status   CV/Hemodynamics: Uneventful            Sign Out: Acceptable CV status; No obvious hypovolemia; No obvious fluid overload   Other NRE: NONE   DID A NON-ROUTINE EVENT OCCUR? No    Event details/Postop Comments:  SAB resolving. No current complaints.             Last vitals:  Vitals Value Taken Time   /64 11/05/24 1220   Temp 36.1  C (96.9  F) 11/05/24 1220   Pulse 64 11/05/24 1228   Resp 31 11/05/24 1228   SpO2 95 % 11/05/24 1228   Vitals shown include unfiled device data.    Electronically Signed By: Joss Ovalle MD  November 5, 2024  12:47 PM

## 2024-11-05 NOTE — PHARMACY-ADMISSION MEDICATION HISTORY
Pharmacist Admission Medication History    Admission medication history is complete. The information provided in this note is only as accurate as the sources available at the time of the update.    Information Source(s): Patient, Caregiver, Patient's pharmacy, Hospital records, and CareEverywhere/SureScripts via in-person    Pertinent Information: Spoke with patient    Changes made to PTA medication list:  Added: Tizanidine prn, Prilosec prn, Albuterol prn   Deleted: None  Changed: Gabapentin per RX 300mg TID but takes it differently    Allergies reviewed with patient and updates made in EHR: yes    Medication History Completed By: Michelle Newman PharmD 11/5/2024 3:15 PM    PTA Med List   Medication Sig Last Dose/Taking    albuterol (PROAIR HFA/PROVENTIL HFA/VENTOLIN HFA) 108 (90 Base) MCG/ACT inhaler Inhale 2 puffs into the lungs every 6 hours as needed for shortness of breath, wheezing or cough. Unknown    celecoxib (CELEBREX) 200 MG capsule Take 200 mg by mouth daily. Unknown    gabapentin (NEURONTIN) 300 MG capsule Take 300 mg by mouth daily as needed. Unknown    gabapentin (NEURONTIN) 300 MG capsule [GABAPENTIN (NEURONTIN) 300 MG CAPSULE] Take 600 mg by mouth bedtime. 11/4/2024    omeprazole (PRILOSEC OTC) 20 MG EC tablet Take 20 mg by mouth daily as needed. Taking As Needed    tiZANidine (ZANAFLEX) 2 MG tablet Take 2-4 mg by mouth nightly as needed. Taking As Needed

## 2024-11-05 NOTE — ANESTHESIA PREPROCEDURE EVALUATION
"Anesthesia Pre-Procedure Evaluation    Patient: Rosemary Kay   MRN: [id number] : 1960        Procedure : Procedure(s):  RIGHT TOTAL HIP ARTHROPLASTY POSTERIOR REVISION BOTH COMPONENTS          Past Medical History:   Diagnosis Date     Anemia      Arthritis      Gastroesophageal reflux disease      Liver disease      Obese      Sleep apnea       Past Surgical History:   Procedure Laterality Date     BIOPSY BREAST Left 2016    Procedure: Evacuation of Hematoma Left Breast;  Surgeon: Halina Pagan MD;  Location: [facility] [place];  Service:      JOINT REPLACEMENT Left      KIDNEY SURGERY      mass excision on the left     LUMPECTOMY BREAST Left 2016    DR. PAGAN     LUMPECTOMY BREAST Left 2016    Dr. Pagan      Allergies   Allergen Reactions     Codeine Unknown     Feels like skin is crawling but can take with benadryl.     Demerol [Meperidine] Unknown     Feels like skin is crawling but can take with benadryl.     Dilaudid [Hydromorphone] Unknown     Pt \"Turns into a monster and doesn't even know who she is\"     Iodinated Contrast Media [Iodinated Contrast Media] Hives     Latex Unknown     condoms     Morphine Unknown     Feels like skin is crawling but can take with benadryl.     Percocet [Oxycodone-Acetaminophen]      Itchy, irritable     Tramadol Itching     Itching but can take with benadryl     Vicodin [Hydrocodone-Acetaminophen] Unknown     Itchy, irritable      Social History     Tobacco Use     Smoking status: Former     Smokeless tobacco: Never     Tobacco comments:     smoke pot; no cigarettes   Substance Use Topics     Alcohol use: Yes     Comment: 3-4 per week      Wt Readings from Last 1 Encounters:   24 112 kg (247 lb)        Anesthesia Evaluation   Pt has had prior anesthetic.         ROS/MED HX  ENT/Pulmonary:     (+) sleep apnea, doesn't use CPAP,                                      Neurologic:  - neg neurologic ROS     Cardiovascular:  - neg cardiovascular ROS " "    METS/Exercise Tolerance:     Hematologic:  - neg hematologic  ROS     Musculoskeletal:   (+)  arthritis,             GI/Hepatic:     (+) GERD,            liver disease,       Renal/Genitourinary:  - neg Renal ROS     Endo:     (+)               Obesity,       Psychiatric/Substance Use:     (+) psychiatric history bipolar       Infectious Disease:  - neg infectious disease ROS     Malignancy:  - neg malignancy ROS     Other:  - neg other ROS        Physical Exam    Airway  airway exam normal      Mallampati: II   TM distance: > 3 FB   Neck ROM: full   Mouth opening: > 3 cm    Respiratory Devices and Support         Dental  no notable dental history     (+) Modest Abnormalities - crowns, retainers, 1 or 2 missing teeth      Cardiovascular   cardiovascular exam normal       Rhythm and rate: regular and normal     Pulmonary   pulmonary exam normal        breath sounds clear to auscultation       OUTSIDE LABS:  CBC:   Lab Results   Component Value Date    WBC 9.8 11/05/2024    WBC 7.4 10/25/2024    HGB 12.7 11/05/2024    HGB 13.1 10/25/2024    HCT 38.4 11/05/2024    HCT 41.2 10/25/2024     11/05/2024     10/25/2024     BMP:   Lab Results   Component Value Date     10/25/2024     09/12/2024    POTASSIUM 3.7 11/05/2024    POTASSIUM 4.6 10/25/2024    CHLORIDE 103 10/25/2024    CHLORIDE 106 09/12/2024    CO2 26 10/25/2024    CO2 26 09/12/2024    BUN 12.1 10/25/2024    BUN 12.6 09/12/2024    CR 0.63 11/05/2024    CR 0.68 10/25/2024     (H) 11/05/2024    GLC 96 10/25/2024     COAGS:   Lab Results   Component Value Date    INR 1.03 11/05/2024     POC: No results found for: \"BGM\", \"HCG\", \"HCGS\"  HEPATIC:   Lab Results   Component Value Date    ALBUMIN 4.4 08/30/2024    PROTTOTAL 7.5 01/29/2024    ALT 19 01/29/2024    AST 19 01/29/2024    ALKPHOS 123 01/29/2024    BILITOTAL 0.2 01/29/2024     OTHER:   Lab Results   Component Value Date    A1C 6.2 (H) 08/30/2024    RAMON 9.3 10/25/2024    SED 37 " "(H) 11/05/2024       Anesthesia Plan    ASA Status:  3    NPO Status:  NPO Appropriate    Anesthesia Type: Spinal.   Induction: N/a.           Consents    Anesthesia Plan(s) and associated risks, benefits, and realistic alternatives discussed. Questions answered and patient/representative(s) expressed understanding.     - Discussed:     - Discussed with:  Patient      - Extended Intubation/Ventilatory Support Discussed: No.      - Patient is DNR/DNI Status: No     Use of blood products discussed: No .     Postoperative Care    Pain management: Multi-modal analgesia.   PONV prophylaxis: Ondansetron (or other 5HT-3), Dexamethasone or Solumedrol     Comments:             Joss Ovalle MD    I have reviewed the pertinent notes and labs in the chart from the past 30 days and (re)examined the patient.  Any updates or changes from those notes are reflected in this note.                         # Obesity: Estimated body mass index is 38.11 kg/m  as calculated from the following:    Height as of this encounter: 1.715 m (5' 7.5\").    Weight as of this encounter: 112 kg (247 lb).             "

## 2024-11-05 NOTE — OP NOTE
"Operative Note    Name:  Rosemary Kay  PCP:  Estela Jiménez  Procedure Date:  11/5/2024      Procedure(s):  RIGHT TOTAL HIP ARTHROPLASTY POSTERIOR REVISION BOTH COMPONENTS     Pre-Procedure Diagnosis:  Femoral loosening of prosthetic right hip, initial encounter (H) [T84.030A]  Loosening of prosthetic hip (H) [T84.038A, Z96.649]  Chronic prosthetic joint infection right MISSY    Post-Procedure Diagnosis:    Same    Surgeon(s):  Rios Sanches MD    Assist:  Samira Thompson PA-C PA-C assist  was required for this operation due to the complexity of the procedure, for proper positioning of the limb during the operation, and for patient safety.  Isela Zuniga CFA    Anesthesia Type:  Regional    Antibiotics:  Ancef 2  g IVPB on induction  Prostalac antibiotic spacer: 3.6 g tobramycin, 1 g vancomycin  Cemented acetabular polyliner: 3.6 g tobramycin, 1 g vancomycin    Condition on discharge from OR:  stable    Indications:  64-year-old female well-known to our service status post primary right MISSY DA 2015.  Patient had pain shortly after the index procedure and did have mildly elevated inflammatory markers.  Aspiration 2016 revealed 100,000 white cells 98% PMNs.  Culture negative.  Then patient lost to follow-up 20 16-20 24, having moved in [place].  Seen orthopedically in [place] but no definitive diagnosis little on treatment rendered.  Patient reengaged with ongoing pain chronically 2/2024 with elevated inflammatory markers, with a \"dry \"aspiration.  Patient then seen by spine service to no avail.  Reengaged with our service in July 2024 after a three-phase bone scan was obtained revealing a potential loose femoral component.  Initial inflammatory markers in July were normal but subsequent inflammatory markers in August were elevated and and again are elevated today.  MRI scan of the hip revealed no loculation of fluid.  Patient has severe hip pain presumably a result of(a)septic loosening of either or both components.  " The nature of the procedure was discussed with patient and she has consented understanding outcome scenarios.    Findings:  Gross purulence encountered and 2 cc of fluid.  Insufficient fluid for cell count and differential  Purulent exudate sent for aerobic/anaerobic culture  Loose femoral stem  Intact acetabular component    Operative Report:     Upon informed consent having reviewed the procedure along with attendant risk of complication in the form collaborative decision was made to proceed.  All protocols and regimens were followed.  Patient cleared by primary care in [facility].  Consent obtained.  Risks reviewed.  Labs are checked.  Inflammatory markers elevated.  Intraoperative options discussed with outcomes scenarios and short long-term plan.  Patient agreed.  She was premedicated, brought to the operative room undergoing anesthetic induction.  Placed in the left lateral decubitus position using the Stulberg hip montalvo.  Axillary roll was placed.  All bony prominences were appropriately padded.  Operative leg was suspended prepped and draped in usual fashion.  Ioban drapes were placed on all exposed skin.    We paused for patient identification, limited procedure confirmation.    A straight lateral incision was made and dissection was carried down to the fascia.  The fascia was incised in multiple self-retaining retractors were placed.  The lower extremity was internally rotated and the posterior structures were exposed.  I perforated the capsule and harvested 2 cc of thick purulent exudate which was sent for aerobic/anaerobic culture.  There was insufficient fluid to send for cell count and differential.  Given these findings, we elected to proceed with antibiotic spacer workflow for a two-stage process to eliminate prosthetic joint infection.    The extra rotators and capsule were taken down as 1 layer tagged with an orthopod suture and reflected.  Prominent synovitis was noted and an extensive synovectomy was  performed.  The hip was dislocated easily.  The femoral prosthetic head was dissociated from the trunnion.  Dissection carried out to obtain good exposure of the proximal femur and the acetabulum.  Retractors were placed about the proximal femur.  The Dat device attached to the femoral neck and the stem was backed out easily.  There was minimal ingrowth distally and none proximally.  The canal was probed and fibrous tissue in the canal was debrided.  A sponge was placed in the canal to prevent bleeding.  Retractors were then placed about the acetabulum anteriorly and inferiorly.    Soft tissue was debrided from around the rim of the acetabular component.  There were no screws.  The 48 mm cup was intact.  I used the Juliane explant with both the short and long blades and was able to remove the acetabular component without bone loss.  A fully contained hemispheric acetabulum resulted.  Reaming of the acetabulum progressed from a 47 mm and 1 mm increments to 50 mm.    Then, 40 g of tobramycin PMMA was mixed with an additional 2.4 g of tobramycin and 1 g of vancomycin.  And highly viscous state, PMMA was inserted into the acetabulum, and a 40 mm inner diameter/56 mm outer diameter acetabular liner was cemented in place.  Previously, the outer aspect of the liner was roughened with a sawblade for PMMA and adherence.  It was inserted in an anatomic position with appropriate inclination and anteversion and extruded cement was removed.  It was held in place absolutely still until cement thoroughly cured.    Attention was then redirected to the femur.  The femur was rotated per posterior MISSY protocol.  I perforated the distal pedestal and then used straight reamers and 0.5 mm increments to 13.5 mm to ream out the canal.  What remaining soft tissue the fibrous lining of the Endo cortex was removed.  I then broached from #1 through #4 Prostalac short broaches.  The proximal femur was sized to a #3 high offset Prostalac  stem.    On the back table, 40 g of tobramycin PMMA was mixed with an additional 2.4 g of tobramycin and 1 g of vancomycin and inserted in the Prostalac #3 mold.  The wound and high offset into the skeleton was placed in the mold.  The mold was clamped and held in place for 20 minutes until cement thoroughly cured.  Then the Prostalac mold was disassembled and we obtained an optimal Prostalac stem.    The canal was then thoroughly irrigated debrided and dried.  The #3 Prostalac stem was then inserted in anatomic anteversion seating optimally at the appropriate level so that the proposed femoral head would be at the level of the greater trochanteric hip.  A trial reduction was performed with both a 1.5 mm x 40 mm head and a 5.0 mm x 40 mm head.  The hip was stable.    The trunnion was cleaned and dried and the 1.5 mm x 40 mm cobalt chrome head was impacted and the hip was reduced.  The hip was stable in 90 degrees of flexion, nearly 75 degrees of internal rotation.    3.5% aqueous Betadine solution soak.  Thorough lavage with multiple liters of antibiotic laden irrigant.  Vancomycin powder throughout the capsular area.  The capsule was repaired with interrupted suture.  The overlying fascia was then repaired with a combination of interrupted and running suture in a watertight fashion and drain was placed superficial to the fascia.  The overlying subcutaneous tissue and skin closed in layers and an impervious dressing was applied    Counts were correct. Bleeding was largely from reamed cancellous surfaces of the acetabulum as well as from the medullary canal of the femur. The patient tolerated the procedure well and was sent to the [place] in stable condition.    Postop plan:  1.posterior MISSY precautions, 90 degree hip flexion restriction  2.weight-bear as tolerated with the use of a walker  3.infectious disease consult today for antibiotic selection pending outcomes of culture results.  4.immediate Ancef plus tobramycin  dosing  5.pain management with oxycodone/Toradol/Celebrex  6.internal medicine/hospitalist to assist.  7.aspirin for DVT prophylaxis per routine.  8.return to clinic 2 weeks  9.surveillance inflammatory markers and potential reimplantation at 12-16 weeks.  10.detailed written instructions provided to patient's daughter.      Estimated Blood Loss:   250 cc    Specimens:    ID Type Source Tests Collected by Time Destination   A : Right hip joint Aspirate Hip, Right ANAEROBIC BACTERIAL CULTURE ROUTINE, GRAM STAIN, AEROBIC BACTERIAL CULTURE ROUTINE Rios Sanches MD 11/5/2024  8:16 AM            Drains: X 1  Closed/Suction Drain 1 Right;Lateral Hip Accordion 10 Maori (Active)       Complications:    None    Rios Sanches MD     Date: 11/5/2024  Time: 10:19 AM      Implant Name Type Inv. Item Serial No.  Lot No. LRB No. Used Action   STEM FEMORAL HO COLLARLESS SZ 12 Total Joint Component/Insert   Depuy [id number] Right 1 Explanted   HEAD CERAMIC DELTA 12/14 28MM +5 Total Joint Component/Insert   Depuy [id number] Right 1 Explanted   BONE CEMENT SIMPLEX W/TOBRAMYCIN 6197-9-001 - [id number] Cement, Bone BONE CEMENT SIMPLEX W/TOBRAMYCIN 6197-9-001  [employer]  Right 1 Implanted   LINER ACETABULAR ALTRX +4 NEUT [id number] - [id number] Total Joint Component/Insert LINER ACETABULAR ALTRX +4 NEUT [id number]  [employer]- [id number] Right 1 Implanted   BONE CEMENT SIMPLEX W/TOBRAMYCIN 6197-9-001 - [id number] Cement, Bone BONE CEMENT SIMPLEX W/TOBRAMYCIN 6197-9-001  [employer]  Right 1 Implanted   BONE CEMENT SIMPLEX W/TOBRAMYCIN 6197-9-001 - [id number] Cement, Bone BONE CEMENT SIMPLEX W/TOBRAMYCIN 6197-9-001  [employer]  Right 1 Implanted   STEM FEMORAL PROSTALAC HIGH 105MM SZ 1 - [id number] Total Joint Component/Insert STEM FEMORAL PROSTALAC HIGH 105MM SZ 1  [employer]- [id number] Right 1 Implanted   HEAD FEMORAL HIP ARTICUL ASPHERE MSPEC 5 - [id number] Total Joint Component/Insert HEAD FEMORAL  HIP ARTICUL ASPHERE [facility] [place]  [employer] [facility]- [id number] Right 1 Implanted

## 2024-11-06 ENCOUNTER — APPOINTMENT (OUTPATIENT)
Dept: RADIOLOGY | Facility: CLINIC | Age: 64
DRG: 467 | End: 2024-11-06
Attending: ORTHOPAEDIC SURGERY
Payer: MEDICARE

## 2024-11-06 ENCOUNTER — ENROLLMENT (OUTPATIENT)
Dept: HOME HEALTH SERVICES | Facility: HOME HEALTH | Age: 64
End: 2024-11-06
Payer: MEDICARE

## 2024-11-06 ENCOUNTER — HOME INFUSION (OUTPATIENT)
Dept: HOME HEALTH SERVICES | Facility: HOME HEALTH | Age: 64
End: 2024-11-06
Payer: MEDICARE

## 2024-11-06 ENCOUNTER — APPOINTMENT (OUTPATIENT)
Dept: PHYSICAL THERAPY | Facility: CLINIC | Age: 64
DRG: 467 | End: 2024-11-06
Attending: ORTHOPAEDIC SURGERY
Payer: MEDICARE

## 2024-11-06 ENCOUNTER — APPOINTMENT (OUTPATIENT)
Dept: OCCUPATIONAL THERAPY | Facility: CLINIC | Age: 64
DRG: 467 | End: 2024-11-06
Attending: ORTHOPAEDIC SURGERY
Payer: MEDICARE

## 2024-11-06 ENCOUNTER — APPOINTMENT (OUTPATIENT)
Dept: INTERVENTIONAL RADIOLOGY/VASCULAR | Facility: CLINIC | Age: 64
DRG: 467 | End: 2024-11-06
Attending: RADIOLOGY
Payer: MEDICARE

## 2024-11-06 DIAGNOSIS — T84.51XA INFECTION ASSOCIATED WITH INTERNAL RIGHT HIP PROSTHESIS, INITIAL ENCOUNTER (H): Primary | ICD-10-CM

## 2024-11-06 LAB
CREAT SERPL-MCNC: 0.7 MG/DL (ref 0.51–0.95)
EGFRCR SERPLBLD CKD-EPI 2021: >90 ML/MIN/1.73M2
GLUCOSE SERPL-MCNC: 139 MG/DL (ref 70–99)
HGB BLD-MCNC: 9.3 G/DL (ref 11.7–15.7)

## 2024-11-06 PROCEDURE — 82565 ASSAY OF CREATININE: CPT

## 2024-11-06 PROCEDURE — 97110 THERAPEUTIC EXERCISES: CPT | Mod: GP

## 2024-11-06 PROCEDURE — 36569 INSJ PICC 5 YR+ W/O IMAGING: CPT

## 2024-11-06 PROCEDURE — 97116 GAIT TRAINING THERAPY: CPT | Mod: GP

## 2024-11-06 PROCEDURE — 97535 SELF CARE MNGMENT TRAINING: CPT | Mod: GO

## 2024-11-06 PROCEDURE — 272N000500 HC NEEDLE CR2

## 2024-11-06 PROCEDURE — 97166 OT EVAL MOD COMPLEX 45 MIN: CPT | Mod: GO

## 2024-11-06 PROCEDURE — 36415 COLL VENOUS BLD VENIPUNCTURE: CPT

## 2024-11-06 PROCEDURE — 250N000011 HC RX IP 250 OP 636: Mod: JZ

## 2024-11-06 PROCEDURE — 250N000013 HC RX MED GY IP 250 OP 250 PS 637: Performed by: NURSE PRACTITIONER

## 2024-11-06 PROCEDURE — 250N000011 HC RX IP 250 OP 636: Performed by: INTERNAL MEDICINE

## 2024-11-06 PROCEDURE — 85018 HEMOGLOBIN: CPT

## 2024-11-06 PROCEDURE — 99232 SBSQ HOSP IP/OBS MODERATE 35: CPT | Performed by: INTERNAL MEDICINE

## 2024-11-06 PROCEDURE — 250N000009 HC RX 250: Performed by: INTERNAL MEDICINE

## 2024-11-06 PROCEDURE — 99232 SBSQ HOSP IP/OBS MODERATE 35: CPT | Performed by: NURSE PRACTITIONER

## 2024-11-06 PROCEDURE — 999N000065 XR CHEST PORT 1 VIEW

## 2024-11-06 PROCEDURE — 82947 ASSAY GLUCOSE BLOOD QUANT: CPT | Performed by: STUDENT IN AN ORGANIZED HEALTH CARE EDUCATION/TRAINING PROGRAM

## 2024-11-06 PROCEDURE — 36598 INJ W/FLUOR EVAL CV DEVICE: CPT

## 2024-11-06 PROCEDURE — 250N000013 HC RX MED GY IP 250 OP 250 PS 637

## 2024-11-06 PROCEDURE — 250N000011 HC RX IP 250 OP 636: Performed by: RADIOLOGY

## 2024-11-06 PROCEDURE — 99232 SBSQ HOSP IP/OBS MODERATE 35: CPT | Performed by: FAMILY MEDICINE

## 2024-11-06 PROCEDURE — 36569 INSJ PICC 5 YR+ W/O IMAGING: CPT | Mod: 52

## 2024-11-06 PROCEDURE — 250N000011 HC RX IP 250 OP 636: Performed by: ORTHOPAEDIC SURGERY

## 2024-11-06 PROCEDURE — 272N000450 HC KIT 4FR POWER PICC SINGLE LUMEN

## 2024-11-06 PROCEDURE — 120N000001 HC R&B MED SURG/OB

## 2024-11-06 RX ORDER — CELECOXIB 200 MG/1
200 CAPSULE ORAL
Status: DISCONTINUED | OUTPATIENT
Start: 2024-11-06 | End: 2024-11-08 | Stop reason: HOSPADM

## 2024-11-06 RX ORDER — HEPARIN SODIUM 200 [USP'U]/100ML
1 INJECTION, SOLUTION INTRAVENOUS EVERY 5 MIN PRN
Status: DISCONTINUED | OUTPATIENT
Start: 2024-11-06 | End: 2024-11-07

## 2024-11-06 RX ORDER — HEPARIN SODIUM (PORCINE) LOCK FLUSH IV SOLN 100 UNIT/ML 100 UNIT/ML
5 SOLUTION INTRAVENOUS ONCE
Status: DISCONTINUED | OUTPATIENT
Start: 2024-11-06 | End: 2024-11-06

## 2024-11-06 RX ORDER — LIDOCAINE 40 MG/G
CREAM TOPICAL
Status: DISCONTINUED | OUTPATIENT
Start: 2024-11-06 | End: 2024-11-08 | Stop reason: HOSPADM

## 2024-11-06 RX ORDER — HEPARIN SODIUM (PORCINE) LOCK FLUSH IV SOLN 100 UNIT/ML 100 UNIT/ML
500 SOLUTION INTRAVENOUS ONCE
Status: DISCONTINUED | OUTPATIENT
Start: 2024-11-06 | End: 2024-11-06

## 2024-11-06 RX ORDER — CEFTRIAXONE 2 G/1
2 INJECTION, POWDER, FOR SOLUTION INTRAMUSCULAR; INTRAVENOUS EVERY 24 HOURS
Status: DISCONTINUED | OUTPATIENT
Start: 2024-11-06 | End: 2024-11-08 | Stop reason: HOSPADM

## 2024-11-06 RX ADMIN — HYDROXYZINE HYDROCHLORIDE 50 MG: 25 TABLET, FILM COATED ORAL at 15:04

## 2024-11-06 RX ADMIN — ACETAMINOPHEN 975 MG: 325 TABLET ORAL at 03:37

## 2024-11-06 RX ADMIN — HEPARIN SODIUM IN SODIUM CHLORIDE 1 BAG: 200 INJECTION INTRAVENOUS at 15:42

## 2024-11-06 RX ADMIN — ACETAMINOPHEN 975 MG: 325 TABLET ORAL at 08:17

## 2024-11-06 RX ADMIN — Medication 1500 MG: at 05:56

## 2024-11-06 RX ADMIN — OXYCODONE 5 MG: 5 TABLET ORAL at 08:18

## 2024-11-06 RX ADMIN — CEFTRIAXONE SODIUM 2 G: 2 INJECTION, POWDER, FOR SOLUTION INTRAMUSCULAR; INTRAVENOUS at 11:15

## 2024-11-06 RX ADMIN — OXYCODONE 5 MG: 5 TABLET ORAL at 11:19

## 2024-11-06 RX ADMIN — SENNOSIDES AND DOCUSATE SODIUM 1 TABLET: 50; 8.6 TABLET ORAL at 19:16

## 2024-11-06 RX ADMIN — ACETAMINOPHEN 975 MG: 325 TABLET ORAL at 15:05

## 2024-11-06 RX ADMIN — OXYCODONE 10 MG: 5 TABLET ORAL at 20:35

## 2024-11-06 RX ADMIN — GABAPENTIN 600 MG: 300 CAPSULE ORAL at 19:16

## 2024-11-06 RX ADMIN — ASPIRIN 81 MG: 81 TABLET, COATED ORAL at 19:16

## 2024-11-06 RX ADMIN — OXYCODONE 5 MG: 5 TABLET ORAL at 15:04

## 2024-11-06 RX ADMIN — TIZANIDINE 4 MG: 4 TABLET ORAL at 09:39

## 2024-11-06 RX ADMIN — LIDOCAINE: 50 OINTMENT TOPICAL at 20:35

## 2024-11-06 RX ADMIN — HYDROXYZINE HYDROCHLORIDE 50 MG: 25 TABLET, FILM COATED ORAL at 20:35

## 2024-11-06 RX ADMIN — CELECOXIB 200 MG: 200 CAPSULE ORAL at 11:16

## 2024-11-06 RX ADMIN — Medication 1500 MG: at 17:33

## 2024-11-06 RX ADMIN — LIDOCAINE: 50 OINTMENT TOPICAL at 00:24

## 2024-11-06 RX ADMIN — KETOROLAC TROMETHAMINE 15 MG: 15 INJECTION, SOLUTION INTRAMUSCULAR; INTRAVENOUS at 05:56

## 2024-11-06 RX ADMIN — HYDROXYZINE HYDROCHLORIDE 50 MG: 25 TABLET, FILM COATED ORAL at 02:22

## 2024-11-06 RX ADMIN — LIDOCAINE: 50 OINTMENT TOPICAL at 14:37

## 2024-11-06 RX ADMIN — HYDROXYZINE HYDROCHLORIDE 50 MG: 25 TABLET, FILM COATED ORAL at 08:18

## 2024-11-06 RX ADMIN — LIDOCAINE HYDROCHLORIDE 2 ML: 10 INJECTION, SOLUTION EPIDURAL; INFILTRATION; INTRACAUDAL; PERINEURAL at 14:00

## 2024-11-06 RX ADMIN — LIDOCAINE: 50 OINTMENT TOPICAL at 17:33

## 2024-11-06 RX ADMIN — OXYCODONE 5 MG: 5 TABLET ORAL at 02:24

## 2024-11-06 RX ADMIN — POLYETHYLENE GLYCOL 3350 17 G: 17 POWDER, FOR SOLUTION ORAL at 10:18

## 2024-11-06 RX ADMIN — SENNOSIDES AND DOCUSATE SODIUM 1 TABLET: 50; 8.6 TABLET ORAL at 10:18

## 2024-11-06 RX ADMIN — ACETAMINOPHEN 975 MG: 325 TABLET ORAL at 19:16

## 2024-11-06 RX ADMIN — KETOROLAC TROMETHAMINE 15 MG: 15 INJECTION, SOLUTION INTRAMUSCULAR; INTRAVENOUS at 00:23

## 2024-11-06 RX ADMIN — ASPIRIN 81 MG: 81 TABLET, COATED ORAL at 10:18

## 2024-11-06 RX ADMIN — CEFAZOLIN SODIUM 2 G: 2 INJECTION, SOLUTION INTRAVENOUS at 03:38

## 2024-11-06 RX ADMIN — OXYCODONE 5 MG: 5 TABLET ORAL at 17:45

## 2024-11-06 ASSESSMENT — ACTIVITIES OF DAILY LIVING (ADL)
ADLS_ACUITY_SCORE: 0
DEPENDENT_IADLS:: INDEPENDENT
ADLS_ACUITY_SCORE: 0

## 2024-11-06 NOTE — PROGRESS NOTES
[facility] ACUTE PAIN SERVICE    [facility], [facility], [facility], [facility], [place]   PAIN Progress Note    Assessment/Plan:  Rosemary Kay is a 64 year old female who was admitted on 11/5/2024.  Pain team was asked to see the patient for post op pain, multiple opioid allergies/intolerances. Admitted for planned procedure. History of RCC, breast cancer, JOHNNIE, CATILYN, bipolar disorder not currently on medications, GERD, loosening prosthetic R hip.       Post op day: Day 1. R hip arthroplasty on 11/5/2024      Allergies/intolerance: Codeine (feels like skin is crawling but can take with Benadryl), Demerol (feels like skin is crawling but can take with Benadryl), morphine (feels like skin is crawling but can take with Benadryl, irritable), Percocet (itching, irritable), tramadol (itching but can take with Benadryl), Norco (itching, irritable), Toradol (feels like skin is crawling)    Pain well controlled. Pain team will sign off. Discussed with Orthopedics     PLAN:   1) Pain is consistent with post op pain     Multimodal Medication Therapy  Topical: Lidocaine ointment 4 times daily  NSAID'S: Estimated Creatinine Clearance: 103.8 mL/min (based on SCr of 0.7 mg/dL). Celebrex 200 mg daily with lunch.   Steroids: None  Muscle Relaxants: Robaxin 750 mg 4 times daily  Adjuvants: Acetaminophen every 6 hours, gabapentin 600 mg at bedtime (home medication), hydroxyzine as needed  Opioids: Oxycodone 5-10 mg q3h prn   IV Pain medication: none  Non-medication interventions: Ice, Rest, PT, OT, Distraction (TV, Music, Reading), Meditation, and Essential Oils  Constipation Prophylaxis: Bisacodyl Suppository, Senna-docusate, and Miralax     -Opioid prescriber has been none recent  -[place]  pulled from system on 11/5/2024. This indicates ongoing fills for gabapentin.  Had tramadol filled in May 2024.  No other opioids other than tramadol filled in May.  Valium filled in January 2024  Discharge Recommendations - We recommend  "prescribing the following at the time of discharge: Per orthopedics    Subjective:  Describes pain as 4-6/10 and aching in the right hip. The patient denies nausea, vomiting, diarrhea, chest pain, shortness of breath, dizziness, fever, and chills. The patient reports passing flatus and no BM yet.        Principal Problem:  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)     Patient Active Problem List   Diagnosis    Bipolar I disorder, most recent episode (or current) mixed, moderate    Cannabis dependence, unspecified    Arthritis    GERD (gastroesophageal reflux disease)    Osteoarthritis    Hematoma    S/P revision of total hip    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)        Objective:    History   Drug Use Unknown          Tobacco Use      Smoking status: Former      Smokeless tobacco: Never      Tobacco comments: smoke pot; no cigarettes      Vital signs in last 24 hours:  /65 (BP Location: Left arm)   Pulse 64   Temp 97.2  F (36.2  C) (Oral)   Resp 16   Ht 1.702 m (5' 7\")   Wt 112.9 kg (249 lb)   SpO2 93%   BMI 39.00 kg/m      Weight:     Vitals:    11/04/24 1700 11/05/24 0554   Weight: 112 kg (247 lb) 112.9 kg (249 lb)      Weight change:   Body mass index is 39 kg/m .    Intake/Output last 3 shifts:  I/O last 3 completed shifts:  In: 1250 [I.V.:1000]  Out: 2575 [Urine:2025; Blood:550]  Intake/Output this shift:  I/O this shift:  In: 960 [P.O.:960]  Out: -     Review of Systems:   As per subjective, all others negative.    Physical Exam:  General Appearance:  Alert, cooperative, no distress, appears stated age, up in a chair   Head:  Normocephalic, without obvious abnormality, atraumatic   Eyes:  PERRL, conjunctiva/corneas clear, EOM's intact   Nose: Nares normal, septum midline   Throat: Lips, mucosa, and tongue normal; teeth and gums normal   Neck: Supple, symmetrical, trachea midline   Back:   Symmetric, no curvature, ROM normal   Lungs:   Clear to auscultation bilaterally, respirations " unlabored, room air   Chest Wall:  No tenderness or deformity   Extremities: Incision covered, drain in place    Skin: Skin warm, dry    Neurologic: Alert and oriented X 3, Moves all 4 extremities   Psych: Affect is appropriate      Imaging: Reviewed I have personally reviewed pertinent notes, labs, tests, and radiologic imaging in patient's chart.  Labs: Reviewed I have personally reviewed pertinent notes, labs, tests, and radiologic imaging in patient's chart.  Notes: Reviewed I have personally reviewed pertinent notes, labs, tests, and radiologic imaging in patient's chart.    Total time spent 35 minutes with greater than 50% in consultation, education and coordination of care.   Also discussed with RN and Orthopedics.   Treatment plan includes: multimodal pain approach, Hospital Medicine Service for medical management, Orthopedics, PT, OT.   Patient educated regarding: multimodal pain approach and medications as listed above.   Elements of Medical Decision Making as described above. Acute or chronic illness or injury or surgery. High risk therapy including opioids, high risk drug therapy including oral and/or parenteral controlled substances.    Patient is understanding of the plan. All questions and concerns addressed to patient's satisfaction.     WILLIAMS Goel-KATERINA  Acute Care Pain Management Program  [facility]   Monday-Friday 8a-4p   Page via Epic or [employer]

## 2024-11-06 NOTE — PROGRESS NOTES
[facility] MEDICINE  PROGRESS NOTE     Code Status: Full Code  Procedure(s):  RIGHT TOTAL HIP ARTHROPLASTY POSTERIOR REVISION BOTH COMPONENTS  1 Day Post-Op  Identification/Summary:   Rosemary Kay is a 64 year old female with a PMH of RCC, breast cancer, JOHNNIE, CAITLYN, bipolar disorder, GERD, loosening prosthetic R hip. Underwent R hip arthroplasty revision extraction with spacer placement on 11/5/2024.  Infectious disease and pain team consulted.  Following cultures.  Vancomycin mildly extravasated into the left arm.  Monitor and utilize ice.  Medically otherwise stable.     Assessment and Plan:    Purulent R hip joint effusion  Status post MISSY revision/spacer placement 11/5/2024  During the case gross purulence was encountered. Spacer was placed, tentative plan is is reimplantation in 12-16 weeks.   Gram stain showed 4+ WBCs.  Cultures pending.  Infectious disease consulted.  Prostalac antibiotic spacer: 3.6 g tobramycin, 1 g vancomycin  Cemented acetabular polyliner: 3.6 g tobramycin, 1 g vancomycin  Currently receiving cefazolin, ceftriaxone and vancomycin.  Further antibiotic management per ID/orthopedics.    Difficult pain control  Patient is having significant difficulty with pain control postoperatively.  Pain team was consulted and clinically doing much better postop day #1.    Vancomycin infiltration  11/6 vancomycin did have small infiltration at the left arm.  Site was outlined.  Minimal tenderness or erythema.  Apply ice to the area and follow at this time.    Acute blood loss anemia  Preoperative hemoglobin 13.1.  Postoperatively down to 9.3.  No indication for transfusion at this time.  Follow per protocols.     JOHNNIE   Obesity.  BMI 39.  She does not use CPAP.  Did not require supplemental oxygen.  Follow clinically.    Hypertriglyceridemia   Noted.  Not on meds    GERD:   Continue on omeprazole.     Bipolar 1 Disorder and Anxiety   Not currently on meds.  Clinically  "appears stable.    History of Left Breast Cancer (Invasive Ductal Carcinoma):   S/P Left Lumpectomy and Radiation Treatment in 2016   Noted.    Anticoagulation   Aspirin 81 mg twice daily per orthopedics.  Standard postoperative risk.    Fluids: Saline lock  Pain meds: Scheduled Tylenol, oxycodone, Zanaflex  Therapy: Per surgery  Butler:Not present  Lines: None       Current Diet  Orders Placed This Encounter      Advance Diet as Tolerated: Regular Diet Adult    Supplements  None    Lines/Drains/Airways       Drain  Duration             Closed/Suction Drain 1 Right;Lateral Hip Accordion 10 Latvian <1 day              Wound  Duration             Incision/Surgical Site 11/05/24 Right;Lateral Hip 1 day                  Barriers to Discharge: Surgical cultures, intravenous antibiotics    Disposition: Likely here at least 1-2 more days  Medically Ready for Discharge: Anticipated in 2-4 Days       Clinically Significant Risk Factors                              # Obesity: Estimated body mass index is 39 kg/m  as calculated from the following:    Height as of this encounter: 1.702 m (5' 7\").    Weight as of this encounter: 112.9 kg (249 lb)., PRESENT ON ADMISSION            Interval History/Subjective:  Patient's pain is doing much better compared to last evening.  Denies any chest pain shortness of breath nausea or vomiting.  Her vancomycin did infiltrate slightly into the left arm.  Minimal tenderness or redness at the site.  Utilizing ice to the area.  Did not require supplemental oxygen overnight.  Questions answered to verbalized satisfaction.      Last 24H PRN:     hydrOXYzine HCl (ATARAX) tablet 50 mg, 50 mg at 11/06/24 0818    oxyCODONE (ROXICODONE) tablet 5 mg, 5 mg at 11/06/24 0818 **OR** oxyCODONE (ROXICODONE) tablet 10 mg, 10 mg at 11/05/24 2035    tiZANidine (ZANAFLEX) tablet 4 mg, 4 mg at 11/05/24 1820    Physical Exam/Objective:  Temp:  [96.3  F (35.7  C)-97.9  F (36.6  C)] 97.5  F (36.4  C)  Pulse:  [61-81] " 61  Resp:  [15-33] 18  BP: ()/(50-80) 118/63  SpO2:  [91 %-99 %] 91 %  Wt Readings from Last 4 Encounters:   11/05/24 112.9 kg (249 lb)   09/13/16 112 kg (247 lb)   09/09/16 112 kg (247 lb)   08/23/16 112 kg (247 lb)     Body mass index is 39 kg/m .    Constitutional: awake, alert, cooperative, no apparent distress, and appears stated age and moderately obese  ENT: Normocephalic, without obvious abnormality, atraumatic, external ears without lesions, oral pharynx with moist mucous membranes, tonsils without erythema or exudates, gums normal and good dentition.  Respiratory: No increased work of breathing, good air exchange, clear to auscultation bilaterally, no crackles or wheezing  Cardiovascular: Normal apical impulse, regular rate and rhythm, normal S1 and S2, no S3 or S4, and no murmur noted  GI: No scars, normal bowel sounds, soft, non-distended, non-tender, no masses palpated, no hepatosplenomegally  Skin: Mild area of erythema and swelling at left forearm approximately 4 x 2 cm.  Areas outlined.  Otherwise normal skin color, texture, turgor, no redness, warmth, or swelling, and no rashes  Musculoskeletal: Limited exam due to postoperative status.  Seen with physical therapy and was able to stand with walker. no lower extremity pitting edema present  Neurologic: Cranial nerves II-XII are grossly intact. Sensory:  Sensory intact  Neuropsychiatric: General: normal, calm, and normal eye contact Level of consciousness: alert / normal Affect: normal Orientation: oriented to self, place, time and situation Memory and insight: normal, memory for past and recent events intact, and thought process normal      Medications:   Personally Reviewed.  Medications   Current Facility-Administered Medications   Medication Dose Route Frequency Provider Last Rate Last Admin    lactated ringers infusion   Intravenous Continuous Natalya Dumont PA-C 100 mL/hr at 11/05/24 1352 New Bag at 11/05/24 1352     Current  Facility-Administered Medications   Medication Dose Route Frequency Provider Last Rate Last Admin    acetaminophen (TYLENOL) tablet 975 mg  975 mg Oral Q6H Yolie Mccarthy APRN CNP   975 mg at 11/06/24 0817    aspirin EC tablet 81 mg  81 mg Oral BID Natalya Dumont PA-C   81 mg at 11/05/24 1910    ceFAZolin (ANCEF) 2 g in 100 mL D5W intermittent infusion  2 g Intravenous Q8H Natalya Dumont PA-C 200 mL/hr at 11/06/24 0338 2 g at 11/06/24 0338    cefTRIAXone (ROCEPHIN) 2 g vial to attach to  ml bag for ADULTS or NS 50 ml bag for PEDS  2 g Intravenous Q24H Dionisio Collado MD        celecoxib (celeBREX) capsule 200 mg  200 mg Oral Daily with lunch Yolie Mccarthy APRN CNP        gabapentin (NEURONTIN) capsule 600 mg  600 mg Oral At Bedtime Yolie Mccarthy APRN CNP   600 mg at 11/05/24 2035    lidocaine (XYLOCAINE) 5 % ointment   Topical 4x Daily Yolie Mccarthy APRN CNP   Given at 11/06/24 0024    polyethylene glycol (MIRALAX) Packet 17 g  17 g Oral Daily Natalya Dumont PA-C        senna-docusate (SENOKOT-S/PERICOLACE) 8.6-50 MG per tablet 1 tablet  1 tablet Oral BID Natalay Dumont PA-C   1 tablet at 11/05/24 1910    sodium chloride (PF) 0.9% PF flush 3 mL  3 mL Intracatheter Q8H Natalya Dumont PA-C   3 mL at 11/05/24 1910    vancomycin (VANCOCIN) 1,500 mg in 0.9% NaCl 250 mL intermittent infusion  1,500 mg Intravenous Q12H Rios Sanches MD   1,500 mg at 11/06/24 0556       Data reviewed today: I personally reviewed all new medications, labs, imaging/diagnostics reports over the past 24 hours. Pertinent findings include:    Imaging:   Recent Results (from the past 24 hours)   XR Pelvis Port 1/2 Views    Narrative    EXAM: XR PELVIS PORT 1/2 VIEWS  LOCATION: [facility]  DATE: 11/5/2024    INDICATION: Status post Hip surgery   posterior approach   COMPARISON: 2/14/2024       Impression    IMPRESSION: Postoperative changes of  a right total hip arthroplasty explantation with placement of an antibiotic spacer. Negative for postoperative purposes.      No results found for this or any previous visit (from the past 4320 hours).    Labs:  XR Pelvis Port 1/2 Views   Final Result   IMPRESSION: Postoperative changes of a right total hip arthroplasty explantation with placement of an antibiotic spacer. Negative for postoperative purposes.         Recent Results (from the past 24 hours)   Hemoglobin    Collection Time: 11/06/24  5:09 AM   Result Value Ref Range    Hemoglobin 9.3 (L) 11.7 - 15.7 g/dL   Creatinine    Collection Time: 11/06/24  5:09 AM   Result Value Ref Range    Creatinine 0.70 0.51 - 0.95 mg/dL    GFR Estimate >90 >60 mL/min/1.73m2   Glucose    Collection Time: 11/06/24  5:09 AM   Result Value Ref Range    Glucose 139 (H) 70 - 99 mg/dL       Pending Labs:  Unresulted Labs Ordered in the Past 30 Days of this Admission       Date and Time Order Name Status Description    11/5/2024  8:18 AM Abscess Aerobic Bacterial Culture Routine In process     11/5/2024  8:18 AM Anaerobic Bacterial Culture Routine In process               Wm Looney MD  [facility]  Phone: #[phone]

## 2024-11-06 NOTE — PROCEDURES
"PICC Line Insertion Procedure Note    Pt. Name: Rosemary Kay  MRN:        [id number]  Procedure: Insertion of a  single Lumen  4 fr  Bard SOLO (valved) Power PICC, Lot number [id number]    Indications: antibiotics    Contraindications : L breast lumpectomy    Procedure Details   Patient identified with 2 identifiers and \"Time Out\" conducted.  .     Central line insertion bundle followed: hand hygeine performed prior to procedure, site cleansed with cholraprep, hat, mask, sterile gloves,sterile gown worn, patient draped with maximum barrier head to toe drape, sterile field maintained.    The vein was assessed and found to be compressible and of adequate size. 2 ml 1% Lidocaine administered sq to the insertion site. A 4 Fr PICC was inserted into the Cephalic vein of the right arm with ultrasound guidance. 1 attempt(s) required to access vein.   Catheter threaded with difficulty, slight resistance but able to fully insert. Good blood return noted.    Modified Seldinger Technique used for insertion.    The 8 sharps that are included in the PICC insertion kit were accounted for and disposed of in the sharps container prior to breakdown of the sterile field.    Catheter secured with Statlock, biopatch and Tegaderm dressing applied.    Findings:  Total catheter length  42 cm, with 0 cm exposed. Mid upper arm circumference is 41.5 cm. Catheter was flushed with 30 cc NS. Patient  tolerated procedure well.    Tip placement verified by xray, read by Maciel Fletcher MD  . Tip placement:  \"right PICC extending through right subclavian vein and then coursing into the low right neck in the right internal jugular vein. The tip was not included in the field-of-view. Repositioning   is suggested.\"    CLABSI prevention brochure left at bedside.        Comments:      Slight resistance was felt with advancement of catheter around axillary area, it was unclear by 3CG if tip was dropping or going up. Xray shows placement in internal " jugular. Discussed case with IR MD, writers concern is that a rewire might produce same results. IR MD suggests to leave PICC in place for rewire under fluoroscopy.    UPDATE: Pt went to IR for repositioning and MD found PICC to be placed in proper position.  See IR procedure note and xray.  IMPRESSION: Patient is brought into the IR suite and the right PICC catheter and exit site were prepped and draped in sterile fashion. Fluoroscopy over the chest reveals the PIC catheter now in excellent position at the SVC-RA junction requiring no   repositioning. This catheter is ready for use.    Patient's primary RN notified PICC is ready for use.    Adelaida Reyes, MARILYN [facility] Vascular Access

## 2024-11-06 NOTE — CONSULTS
Care Management Initial Consult    General Information  Assessment completed with: Patient,    Type of CM/SW Visit: Initial Assessment    Primary Care Provider verified and updated as needed: Yes   Readmission within the last 30 days: no previous admission in last 30 days      Reason for Consult: discharge planning  Advance Care Planning: Advance Care Planning Reviewed: present on chart, verified with patient          Communication Assessment  Patient's communication style: spoken language (English or Bilingual)    Hearing Difficulty or Deaf: no   Wear Glasses or Blind: no    Cognitive  Cognitive/Neuro/Behavioral: WDL                      Living Environment:   People in home: child(roshan), adult, grandchild(roshan)  Jacqlien  Current living Arrangements: house      Able to return to prior arrangements: yes       Family/Social Support:  Care provided by: self  Provides care for: no one  Marital Status:   Support system: Children, Friend, Other (specify) (grandson)          Description of Support System: Supportive, Involved         Current Resources:   Patient receiving home care services: No        Community Resources: None  Equipment currently used at home: raised toilet seat, shower chair, walker, standard, walker, rolling, cane, straight, dressing device  Supplies currently used at home: Compression Stockings    Employment/Financial:  Employment Status: retired        Financial Concerns: none   Referral to Financial Worker: No       Does the patient's insurance plan have a 3 day qualifying hospital stay waiver?  Yes     Which insurance plan 3 day waiver is available? Alternative insurance waiver    Will the waiver be used for post-acute placement? No    Lifestyle & Psychosocial Needs:  Social Drivers of Health     Food Insecurity: Not on file   Depression: Not on file   Housing Stability: Not on file   Tobacco Use: Medium Risk (11/5/2024)    Patient History     Smoking Tobacco Use: Former     Smokeless Tobacco  Use: Never     Passive Exposure: Not on file   Financial Resource Strain: Not on file   Alcohol Use: Not on file   Transportation Needs: Not on file   Physical Activity: Not on file   Interpersonal Safety: Low Risk  (11/1/2024)    Interpersonal Safety     Do you feel physically and emotionally safe where you currently live?: Yes     Within the past 12 months, have you been hit, slapped, kicked or otherwise physically hurt by someone?: No     Within the past 12 months, have you been humiliated or emotionally abused in other ways by your partner or ex-partner?: No   Stress: Not on file   Social Connections: Not on file   Health Literacy: Not on file       Functional Status:  Prior to admission patient needed assistance:   Dependent ADLs:: Ambulation-walker, Independent  Dependent IADLs:: Independent       Mental Health Status:  Mental Health Status: No Current Concerns       Chemical Dependency Status:  Chemical Dependency Status: No Current Concerns             Values/Beliefs:  Spiritual, Cultural Beliefs, Roman Catholic Practices, Values that affect care: no               Discussed  Partnership in Safe Discharge Planning  document with patient/family: Yes: pt verbalized understanding    Additional Information:  Rosemary Kay is a(n) 64 year old year old female who presented with Femoral loosening of prosthetic right hip, initial encounter (H) [T84.030A]  Loosening of prosthetic hip (H) [T84.038A, Z96.649].     CM met with pt at bedside. Introduced self and role. Patient assisted with completing assessment. Patient lives in a(n) house with her daughter and adult grandson . Patient is independent with IADL/ADLs, however may require assistance at time. Anticipated that patient will discharge to home with family.    Pt denies any further needs at this time.    [facility].     Contacts:  Extended Emergency Contact Information  Primary Emergency Contact: Morgan Kay   [facility]  Phone: [phone]  Relation: Daughter      Next Steps: Waiting for final cultures.     Mukesh Ptaricio RN

## 2024-11-06 NOTE — PROGRESS NOTES
Occupational Therapy Discharge Summary    Reason for therapy discharge:    Discharged to home.    Progress towards therapy goal(s). See goals on Care Plan in [facility] electronic health record for goal details.  Goals met    Therapy recommendation(s):    No further therapy is recommended.

## 2024-11-06 NOTE — PLAN OF CARE
Patient vital signs are at baseline: Yes  Patient able to ambulate as they were prior to admission or with assist devices provided by therapies during their stay:  Yes  Patient MUST void prior to discharge:  Yes  Patient able to tolerate oral intake:  Yes  Pain has adequate pain control using Oral analgesics:  Yes  Does patient have an identified :  Yes  Has goal D/C date and time been discussed with patient:  Yes  Goal Outcome Evaluation:    Pt is eager and motivated to go home as soon as possible. Ambulating to and from bathroom quite well with walker, Ax1 for safety. Voiding adequately. Hemovac drain has bloody and milky looking drainage in the tubing but no measurable output. Pain managed with PO medications, along with scheduled Toradol as ordered. Delta change in Hgb this AM: now 9.3 today, was 12.7, pt denies symptoms. Abx infused per MAR overnight. Tolerating PO well, PIV is saline-locked per order (between abx).

## 2024-11-06 NOTE — PROGRESS NOTES
11/06/24 0824   Appointment Info   Signing Clinician's Name / Credentials (OT) SEKOU Sahni/L, CLT   Rehab Comments (OT) OT ana mraia   Quick Adds   Quick Adds Certification   Living Environment   People in Home child(roshan), adult   Current Living Arrangements house   Self-Care   Usual Activity Tolerance good   Current Activity Tolerance moderate   Equipment Currently Used at Home raised toilet seat;grab bar, toilet;dressing device   Fall history within last six months no   Activity/Exercise/Self-Care Comment Pt IND w/ ADLs and IADLs at baseline   General Information   Onset of Illness/Injury or Date of Surgery 11/05/24   Referring Physician Dr. Sanches   Patient/Family Therapy Goal Statement (OT) To go home   Additional Occupational Profile Info/Pertinent History of Current Problem s/p MISSY   Existing Precautions/Restrictions no hip IR;no hip ADD past midline;90 degree hip flexion;weight bearing;no active hip ABD;no hip ER;no hip hyperextension   Right Lower Extremity (Weight-bearing Status) weight-bearing as tolerated (WBAT)   Cognitive Status Examination   Orientation Status orientation to person, place and time   Affect/Mental Status (Cognitive) WFL   Visual Perception   Visual Impairment/Limitations WFL   Sensory   Sensory Quick Adds sensation intact   Pain Assessment   Patient Currently in Pain No   Posture   Posture not impaired   Range of Motion Comprehensive   General Range of Motion no range of motion deficits identified   Strength Comprehensive (MMT)   General Manual Muscle Testing (MMT) Assessment no strength deficits identified   Muscle Tone Assessment   Muscle Tone Quick Adds No deficits were identified   Coordination   Upper Extremity Coordination No deficits were identified   Bed Mobility   Bed Mobility supine-sit;sit-supine   Comment (Bed Mobility) SBA-CGA   Transfers   Transfers bed-chair transfer;sit-stand transfer;toilet transfer;shower transfer   Transfer Comments SBA-CGA   Transfer Skill: Bed  to Chair/Chair to Bed   Transfer Comments CGA   Sit-Stand Transfer   Sit/Stand Transfer Comments CGA   Shower Transfer   Shower Transfer Comments CGA   Toilet Transfer   Toilet Transfer Comments CGA   Balance   Balance Comments decreased   Activities of Daily Living   BADL Assessment/Intervention lower body dressing;toileting;bathing   Bathing Assessment/Intervention   Cornell Level (Bathing) lower body;minimum assist (75% patient effort)   Lower Body Dressing Assessment/Training   [place]ell Level (Lower Body Dressing) lower body dressing skills;maximum assist (25% patient effort)   Toileting   Cornell Level (Toileting) adjust/manage clothing;supervision   Clinical Impression   Criteria for Skilled Therapeutic Interventions Met (OT) Yes, treatment indicated   OT Diagnosis decreased ADLs   Influenced by the following impairments MISSY   OT Problem List-Impairments impacting ADL activity tolerance impaired;mobility;pain;post-surgical precautions;flexibility;balance   Assessment of Occupational Performance 3-5 Performance Deficits   Identified Performance Deficits LE dressing/bathing, bed mobility, all transfers, toileting   Planned Therapy Interventions (OT) ADL retraining;bed mobility training;transfer training   Clinical Decision Making Complexity (OT) detailed assessment/moderate complexity   Risk & Benefits of therapy have been explained evaluation/treatment results reviewed;patient   OT Total Evaluation Time   OT Eval, Moderate Complexity Minutes (52648) 10   Therapy Certification   Medical Diagnosis MISSY   Start of Care Date 11/06/24   Certification date from 11/06/24   Certification date to 12/06/24   OT Goals   Therapy Frequency (OT) One time eval and treatment   OT Predicted Duration/Target Date for Goal Attainment 11/06/24   OT Goals Lower Body Dressing;Bed Mobility;Toilet Transfer/Toileting   OT: Lower Body Dressing Modified independent;within precautions;Goal Met;Completed;using adaptive  equipment   OT: Bed Mobility Modified independent;supine to/from sitting;rolling;Goal Met;Completed   OT: Toilet Transfer/Toileting Modified independent;toilet transfer;cleaning and garment management;using adaptive equipment;within precautions;Goal Met;Completed   Interventions   Interventions Quick Adds Self-Care/Home Management   Self-Care/Home Management   Self-Care/Home Mgmt/ADL, Compensatory, Meal Prep Minutes (25184) 23   Symptoms Noted During/After Treatment (Meal Preparation/Planning Training) none   Treatment Detail/Skilled Intervention Pt edu on hip px - demonstrated ability to complete ADLs w/in px. Pt edu on compensatory strategies for LE dressing using reacher and sock aid - completed Mod I w/ AE. STS w/ SBA and FWW. Pt amb. 15 ft to BR w/ FWW Mod I. Edu on toilet transfer w/ grab bars - completed each Mod I, and toileting with mod I.  Pt instructed on bed mobility techniques - completed Mod I and educ further on safe bed mob and pt stated will be sleeping in recliner, but this therapist informed pt that she needs to use leg  for any abduction with R LE at this time. Pt edu on sleeping position and car transfers - pt verbalized understanding.   OT Discharge Planning   OT Plan OT d/c   OT Discharge Recommendation (DC Rec) (S)  home with assist   OT Rationale for DC Rec pt has support from   Total Session Time   Timed Code Treatment Minutes 23   Total Session Time (sum of timed and untimed services) 33    [facility]  OUTPATIENT OCCUPATIONAL THERAPY  EVALUATION  PLAN OF TREATMENT FOR OUTPATIENT REHABILITATION  (COMPLETE FOR INITIAL CLAIMS ONLY)  Patient's Last Name, First Name, M.I.  YOB: 1960  Rosemary Kay                          Provider's Name  [facility] Medical Record No.  [id number]                             Onset Date:  11/05/24   Start of Care Date:  11/06/24   Type:     ___PT   _X_OT   ___SLP Medical  Diagnosis:  MISSY                    OT Diagnosis:  decreased ADLs Visits from SOC:  1     See note for plan of treatment, functional goals and certification details    I CERTIFY THE NEED FOR THESE SERVICES FURNISHED UNDER        THIS PLAN OF TREATMENT AND WHILE UNDER MY CARE     (Physician co-signature of this document indicates review and certification of the therapy plan).

## 2024-11-06 NOTE — PLAN OF CARE
Patient vital signs are at baseline: Yes  Patient able to ambulate as they were prior to admission or with assist devices provided by therapies during their stay:  Yes  Patient MUST void prior to discharge:  Yes  Patient able to tolerate oral intake:  Yes  Pain has adequate pain control using Oral analgesics:  Yes  Does patient have an identified :  Yes  Has goal D/C date and time been discussed with patient:  Yes    Waiting for final cultures and antibiotic plan.

## 2024-11-06 NOTE — PROGRESS NOTES
"INFECTIOUS DISEASE FOLLOW UP NOTE    Date: 11/06/2024   CHIEF COMPLAINT: No chief complaint on file.       ================================================================  SUBJECTIVE  Feeling much better today, much less pain. No complaints    ================================================================  OBJECTIVE  /65 (BP Location: Left arm)   Pulse 64   Temp 97.2  F (36.2  C) (Oral)   Resp 16   Ht 1.702 m (5' 7\")   Wt 112.9 kg (249 lb)   SpO2 93%   BMI 39.00 kg/m      Physical Exam  General: alert, cooperative, no apparent distress  HEENT: atraumatic, normocephalic, no scleral icterus, moist mucus membranes, no cervical lymphadenopathy  Heart: Normal rate, regular rhythm, no murmurs  Lungs: CTAB, good inspiratory effort  Abdomen: soft, non-tender, non-distended  Extremities: no peripheral edema, no new rashes or lesions. Right hip s/p surgery    Pertinent labs  No results found for: \"CRP\"   CBC RESULTS:   Recent Labs   Lab Test 11/06/24  0509 11/05/24  0623   WBC  --  9.8   RBC  --  4.27   HGB 9.3* 12.7   HCT  --  38.4   MCV  --  90   MCH  --  29.7   MCHC  --  33.1   RDW  --  13.9   PLT  --  173      Imaging  11/5 x-ray  IMPRESSION: Postoperative changes of a right total hip arthroplasty explantation with placement of an antibiotic spacer. Negative for postoperative purposes      Microbiology data  11/5/24 right hip (from OR): NGTD        I have personally reviewed the relevant laboratory, imaging, and microbiology data  ================================================================  Assessment  Ms. Kay is a 64o female with right hip PJI. PMH JOHNNIE, bipolar disorder, hx bilateral hip replacements.     She had initial right hip MISSY in 2015.  Per ortho note, in 2016 had right hip joint aspiration which showed 100,000 WBCs, but was lost to follow-up.  Based on her telling, she was never treated for what is presumably a chronic PJI. Regardless, she is now s/p hardware explantation on 11/5 with " spacer placement, cultures pending. Tentative plan is for 2nd stage implantation in ~3 months.  For now will treat as culture-negative PJI.     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  Fax [phone]  [employer] paging

## 2024-11-06 NOTE — PROGRESS NOTES
"Orthopedic Progress Note      Assessment: 1 Day Post-Op  S/P Procedure(s):  RIGHT TOTAL HIP ARTHROPLASTY POSTERIOR REVISION BOTH COMPONENTS @    Plan:   - Posterior Hip Precautions  - Continue PT/OT  - Weightbearing status: WBAT with use of a walker  - Anticoagulation: ASA 81 mg BID in addition to SCDs, jerri stockings and early ambulation.  - Pending cultures and ID consult, appreciate their recs  - Discharge planning: pending culture results and medical clearance      Subjective:  Pain: mild, well controlled on oral Oxycodone, Tizanidine, Acetaminophen, Gabapentin, and Celebrex  Chest pain, SOB: No  Nausea, Vomiting:  No  Lightheadedness, Dizziness:  No  Neuro:  Patient denies new onset numbness or paresthesias    Patient doing well POD#1 revision right MISSY. She states her pain is well controlled on her current regimen. She has no new complaints and concerns. She had multiple questions about the surgery/infection so time was spent educating the patient. Her drain came disconnected so we reconnected it. She has had minimal drainage so drain removed per Dr. Sanches. Hgb 9.3 (pre-op 12.7). She is asymptomatic but we will continue to monitor. Ambulating, tolerating oral intake, voiding & pain is controlled with oral medication.    Objective:  /63 (BP Location: Left arm)   Pulse 61   Temp 97.5  F (36.4  C) (Oral)   Resp 18   Ht 1.702 m (5' 7\")   Wt 112.9 kg (249 lb)   SpO2 91%   BMI 39.00 kg/m    The patient is A&Ox3. Appears comfortable.   Sensation is intact.  Dorsiflexion and plantar flexion is intact.  Dorsalis pedis pulse intact.  Calves are soft and non-tender. Negative Phoenix's.  The incision is covered. Dressing C/D/I.    Drain intact with bloody/milky discharge in tubing but no measurable output. Drain removed today per Dr. Sanches    Pertinent Labs   Lab Results: personally reviewed.   Lab Results   Component Value Date    INR 1.03 11/05/2024     Lab Results   Component Value Date    WBC 9.8 11/05/2024 "    HGB 9.3 (L) 11/06/2024    HCT 38.4 11/05/2024    MCV 90 11/05/2024     11/05/2024     Lab Results   Component Value Date     10/25/2024    CO2 26 10/25/2024         Report completed by:  Samira Pedroza PA-C/[facility]    Date: 11/6/2024  Time: 9:26 AM

## 2024-11-06 NOTE — IR NOTE
Patient Name: Rosemary Kay  Medical Record Number: [id number]  Today's Date: 11/6/2024    Procedure: Image guided PICC line placement   Proceduralist: Dr. Benson Hernandez  Pathology present: na    Procedure Start: 1554  Procedure end: 1555  Sedation medications administered: none     Report given to: MARILYN Santoro   : manuel    Other Notes: Pt arrived to IR procedure room from [facility]. Consent already obtained by PICC RN. Pt denies any questions or concerns regarding procedure. Pt positioned supine and monitored per protocol. Pt tolerated procedure without any noted complications. Pt transferred back to [facility].    Per IR MD, PICC In correct position, PICC line acceptable for immediate use.

## 2024-11-06 NOTE — PROGRESS NOTES
[facility]    Referral received for potential IV antibiotics.    Benefits verified. Pt has 100% coverage for IV antibiotics under her On license [facility] secondary plan, however, the patient may have a copay per dispense on IV antibiotics under her Part D plan.    Should pt require IV antibiotics, writer will speak with pt to review benefits, home infusion and to offer choice of agency/home infusion provider.    Thank you for the referral.    Erica Nova RN, BSN  [facility]  [phone] (Mon through Fri, 8:00 am-5:00 pm)  [phone] (Office)

## 2024-11-07 ENCOUNTER — TELEPHONE (OUTPATIENT)
Dept: INFECTIOUS DISEASES | Facility: CLINIC | Age: 64
End: 2024-11-07

## 2024-11-07 LAB
CREAT SERPL-MCNC: 0.78 MG/DL (ref 0.51–0.95)
EGFRCR SERPLBLD CKD-EPI 2021: 84 ML/MIN/1.73M2
GLUCOSE SERPL-MCNC: 98 MG/DL (ref 70–99)
HGB BLD-MCNC: 8.4 G/DL (ref 11.7–15.7)
VANCOMYCIN SERPL-MCNC: 17.5 UG/ML

## 2024-11-07 PROCEDURE — 250N000011 HC RX IP 250 OP 636: Performed by: ORTHOPAEDIC SURGERY

## 2024-11-07 PROCEDURE — 250N000011 HC RX IP 250 OP 636: Performed by: INTERNAL MEDICINE

## 2024-11-07 PROCEDURE — 85018 HEMOGLOBIN: CPT

## 2024-11-07 PROCEDURE — 250N000013 HC RX MED GY IP 250 OP 250 PS 637

## 2024-11-07 PROCEDURE — 99232 SBSQ HOSP IP/OBS MODERATE 35: CPT | Performed by: INTERNAL MEDICINE

## 2024-11-07 PROCEDURE — 82565 ASSAY OF CREATININE: CPT | Performed by: ORTHOPAEDIC SURGERY

## 2024-11-07 PROCEDURE — 82947 ASSAY GLUCOSE BLOOD QUANT: CPT | Performed by: ORTHOPAEDIC SURGERY

## 2024-11-07 PROCEDURE — 250N000013 HC RX MED GY IP 250 OP 250 PS 637: Performed by: NURSE PRACTITIONER

## 2024-11-07 PROCEDURE — 120N000001 HC R&B MED SURG/OB

## 2024-11-07 PROCEDURE — 99232 SBSQ HOSP IP/OBS MODERATE 35: CPT | Performed by: FAMILY MEDICINE

## 2024-11-07 PROCEDURE — 80202 ASSAY OF VANCOMYCIN: CPT | Performed by: ORTHOPAEDIC SURGERY

## 2024-11-07 RX ORDER — CEFTRIAXONE 2 G/1
2 INJECTION, POWDER, FOR SOLUTION INTRAMUSCULAR; INTRAVENOUS EVERY 24 HOURS
Status: SHIPPED
Start: 2024-11-08 | End: 2024-11-08

## 2024-11-07 RX ORDER — VANCOMYCIN HYDROCHLORIDE 1 G/200ML
1000 INJECTION, SOLUTION INTRAVENOUS EVERY 12 HOURS
Status: DISCONTINUED | OUTPATIENT
Start: 2024-11-07 | End: 2024-11-08 | Stop reason: HOSPADM

## 2024-11-07 RX ADMIN — SENNOSIDES AND DOCUSATE SODIUM 1 TABLET: 50; 8.6 TABLET ORAL at 09:15

## 2024-11-07 RX ADMIN — OXYCODONE 10 MG: 5 TABLET ORAL at 06:46

## 2024-11-07 RX ADMIN — HYDROXYZINE HYDROCHLORIDE 50 MG: 25 TABLET, FILM COATED ORAL at 16:12

## 2024-11-07 RX ADMIN — POLYETHYLENE GLYCOL 3350 17 G: 17 POWDER, FOR SOLUTION ORAL at 09:15

## 2024-11-07 RX ADMIN — Medication 1500 MG: at 05:26

## 2024-11-07 RX ADMIN — LIDOCAINE: 50 OINTMENT TOPICAL at 09:16

## 2024-11-07 RX ADMIN — OXYCODONE 10 MG: 5 TABLET ORAL at 09:47

## 2024-11-07 RX ADMIN — LIDOCAINE: 50 OINTMENT TOPICAL at 16:13

## 2024-11-07 RX ADMIN — OXYCODONE 5 MG: 5 TABLET ORAL at 16:12

## 2024-11-07 RX ADMIN — OXYCODONE 5 MG: 5 TABLET ORAL at 12:51

## 2024-11-07 RX ADMIN — CEFTRIAXONE SODIUM 2 G: 2 INJECTION, POWDER, FOR SOLUTION INTRAMUSCULAR; INTRAVENOUS at 09:29

## 2024-11-07 RX ADMIN — ACETAMINOPHEN 975 MG: 325 TABLET ORAL at 21:24

## 2024-11-07 RX ADMIN — CELECOXIB 200 MG: 200 CAPSULE ORAL at 12:36

## 2024-11-07 RX ADMIN — ACETAMINOPHEN 975 MG: 325 TABLET ORAL at 09:16

## 2024-11-07 RX ADMIN — LIDOCAINE: 50 OINTMENT TOPICAL at 21:25

## 2024-11-07 RX ADMIN — OXYCODONE 10 MG: 5 TABLET ORAL at 21:24

## 2024-11-07 RX ADMIN — HYDROXYZINE HYDROCHLORIDE 50 MG: 25 TABLET, FILM COATED ORAL at 09:47

## 2024-11-07 RX ADMIN — SENNOSIDES AND DOCUSATE SODIUM 1 TABLET: 50; 8.6 TABLET ORAL at 21:24

## 2024-11-07 RX ADMIN — VANCOMYCIN HYDROCHLORIDE 1000 MG: 1 INJECTION, SOLUTION INTRAVENOUS at 18:15

## 2024-11-07 RX ADMIN — HYDROXYZINE HYDROCHLORIDE 50 MG: 25 TABLET, FILM COATED ORAL at 21:24

## 2024-11-07 RX ADMIN — ACETAMINOPHEN 975 MG: 325 TABLET ORAL at 01:15

## 2024-11-07 RX ADMIN — ACETAMINOPHEN 975 MG: 325 TABLET ORAL at 14:48

## 2024-11-07 RX ADMIN — OXYCODONE 10 MG: 5 TABLET ORAL at 00:15

## 2024-11-07 RX ADMIN — GABAPENTIN 600 MG: 300 CAPSULE ORAL at 21:24

## 2024-11-07 RX ADMIN — ASPIRIN 81 MG: 81 TABLET, COATED ORAL at 09:15

## 2024-11-07 RX ADMIN — LIDOCAINE: 50 OINTMENT TOPICAL at 12:36

## 2024-11-07 RX ADMIN — ASPIRIN 81 MG: 81 TABLET, COATED ORAL at 21:25

## 2024-11-07 RX ADMIN — TIZANIDINE 4 MG: 4 TABLET ORAL at 09:26

## 2024-11-07 NOTE — TELEPHONE ENCOUNTER
----- Message from Dionisio Collado sent at 11/7/2024 12:39 PM CST -----  Please schedul with me on 12/5, thanks

## 2024-11-07 NOTE — PROGRESS NOTES
Care Management Follow Up    Length of Stay (days): 2    Expected Discharge Date: 11/08/2024     Concerns to be Addressed: care coordination/care conferences, discharge planning       Patient plan of care discussed at interdisciplinary rounds: Yes    Anticipated Discharge Disposition: Home, Home Infusion, Home Care     Anticipated Discharge Services:  Home Infusion, Home Care    Anticipated Discharge DME: None    Education Provided on the Discharge Plan: Yes (AVS per bedside RN)    Patient/Family in Agreement with the Plan: yes    Referrals Placed by CM/SW:  home infusion         Additional Information:  CM reviewed chart. CM met with patient. Patient is agreeable to home infusion. Reports that she lives with daughter and grandson. Patient states she is comfortable with home infusion after she has some teaching done. CM coordinating with [facility]. Family to provide transportation home at discharge.     Next Steps: finalize home infusion discharge plan        Kailyn Siddiqui RN  Care Coordinator

## 2024-11-07 NOTE — PROGRESS NOTES
"Orthopedic Progress Note      Assessment: 2 Day Post-Op  S/P Procedure(s):  RIGHT TOTAL HIP ARTHROPLASTY POSTERIOR REVISION BOTH COMPONENTS @    Plan:   - Posterior Hip Precautions  - Continue PT/OT  - Weightbearing status: WBAT with use of a walker  - Anticoagulation: ASA 81 mg BID in addition to SCDs, jerri stockings and early ambulation.  - Pending cultures (Gram stain showed 4+ WBCs). Appreciate ID recs  - Prostalac antibiotic spacer: 3.6 g tobramycin, 1 g vancomycin  - Cemented acetabular polyliner: 3.6 g tobramycin, 1 g vancomycin  - Currently receiving ceftriaxone and vancomycin.  - Discharge planning: pending culture results and medical clearance      Subjective:  Pain: moderate, controlled on oral Oxycodone, Tizanidine, Acetaminophen, Gabapentin, and Celebrex  Chest pain, SOB: No  Nausea, Vomiting:  No  Lightheadedness, Dizziness:  No  Neuro:  Patient denies new onset numbness or paresthesias    Patient doing well POD#2 revision right MISSY. She states her pain was significantly worse last night but that it has been more tolerable today. Hgb 8.4 today (9.3 yesterday). She is asymptomatic but we will continue to monitor. Ambulating, tolerating oral intake, voiding & pain is tolerable with oral medication.    Objective:  /64 (BP Location: Left arm)   Pulse 75   Temp 98.7  F (37.1  C) (Oral)   Resp 16   Ht 1.702 m (5' 7\")   Wt 112.9 kg (249 lb)   SpO2 98%   BMI 39.00 kg/m    The patient is A&Ox3. Appears comfortable.   Sensation is intact.  Dorsiflexion and plantar flexion is intact.  Dorsalis pedis pulse intact.  Calves are soft and non-tender. Negative Phoenix's.  The incision is covered. Dressing C/D/I.    Drain removed yesterday    Pertinent Labs   Lab Results: personally reviewed.   Lab Results   Component Value Date    INR 1.03 11/05/2024     Lab Results   Component Value Date    WBC 9.8 11/05/2024    HGB 8.4 (L) 11/07/2024    HCT 38.4 11/05/2024    MCV 90 11/05/2024     11/05/2024     Lab " Results   Component Value Date     10/25/2024    CO2 26 10/25/2024         Report completed by:  Samira Pedroza PA-C/Dr. Sanches  [facility]    Date: 11/7/2024  Time: 10:50 AM

## 2024-11-07 NOTE — PHARMACY-VANCOMYCIN DOSING SERVICE
Pharmacy Vancomycin Note  Date of Service 2024  Patient's  1960   64 year old, female    Indication: Bone and Joint Infection and Postoperative Infection  Day of Therapy: 3  Current vancomycin regimen:  1500 mg IV q12h  Current vancomycin monitoring method: AUC  Current vancomycin therapeutic monitoring goal: 400-600 mg*h/L    [employer] Prediction of Current Vancomycin Regimen  Regimen: 1500 mg IV every 12 hours.  Start time: 17:26 on 2024  Exposure target: AUC24 (range)400-600 mg/L.hr   AUC24,ss: 685 mg/L.hr  Probability of AUC24 > 400: 100 %  Ctrough,ss: 22.5 mg/L  Probability of Ctrough,ss > 20: 68 %  Probability of nephrotoxicity (Lodise PAUL ): 22 %      Current estimated CrCl = Estimated Creatinine Clearance: 93.2 mL/min (based on SCr of 0.78 mg/dL).    Creatinine for last 3 days  2024:  6:23 AM Creatinine 0.63 mg/dL  2024:  5:09 AM Creatinine 0.70 mg/dL  2024:  4:10 AM Creatinine 0.78 mg/dL    Recent Vancomycin Levels (past 3 days)  2024:  4:10 AM Vancomycin 17.5 ug/mL    Vancomycin IV Administrations (past 72 hours)                     vancomycin (VANCOCIN) 1,500 mg in 0.9% NaCl 250 mL intermittent infusion (mg) 1,500 mg New Bag 24 0526     1,500 mg New Bag 24 1733     1,500 mg New Bag  0556     1,500 mg New Bag 24 1643                    Nephrotoxins and other renal medications (From now, onward)      Start     Dose/Rate Route Frequency Ordered Stop    24 1800  vancomycin (VANCOCIN) 1,000 mg in NaCl 0.9% 200 mL intermittent infusion         1,000 mg  over 60 Minutes Intravenous EVERY 12 HOURS 24 1355      24 0000  vancomycin (VANCOCIN) 1500 mg/250 mL IVPB         1,500 mg  over 90 Minutes Intravenous EVERY 12 HOURS 24 1238 24 3899               Contrast Orders - past 72 hours (72h ago, onward)      None            Interpretation of levels and current regimen:  Vancomycin level is reflective of -600    Has  serum creatinine changed greater than 50% in last 72 hours: No but trending upward.    Urine output:  unable to determine    Renal Function: Worsening    [employer] Prediction of Planned New Vancomycin Regimen  Regimen: 1000 mg IV every 12 hours.  Start time: 17:26 on 11/07/2024  Exposure target: AUC24 (range)400-600 mg/L.hr   AUC24,ss: 464 mg/L.hr  Probability of AUC24 > 400: 80 %  Ctrough,ss: 15.1 mg/L  Probability of Ctrough,ss > 20: 14 %  Probability of nephrotoxicity (Lodise PAUL 2009): 10 %      Plan:  Decrease Dose to 1000 mg IV q12h.  Vancomycin monitoring method: AUC  Vancomycin therapeutic monitoring goal: 400-600 mg*h/L  Pharmacy will check vancomycin levels as appropriate in 1-3 Days.  Serum creatinine levels will be ordered daily for the first week of therapy and at least twice weekly for subsequent weeks.    Halina Gilliam, [facility]

## 2024-11-07 NOTE — PLAN OF CARE
Patient vital signs are at baseline: Yes  Patient able to ambulate as they were prior to admission or with assist devices provided by therapies during their stay:  Yes  Patient MUST void prior to discharge:  Yes  Patient able to tolerate oral intake:  Yes  Pain has adequate pain control using Oral analgesics:  Yes  Does patient have an identified :  Yes  Has goal D/C date and time been discussed with patient:  Yes    Up ambulating independently. Increased pain with ambulation but able to be controlled with oral pain meds and ice packs. Will discharge to home once antibiotic plan is finalized.

## 2024-11-07 NOTE — PROGRESS NOTES
"INFECTIOUS DISEASE FOLLOW UP NOTE    Date: 11/07/2024   CHIEF COMPLAINT: No chief complaint on file.       ================================================================  SUBJECTIVE  Feeling okay today. Pain under passable control, but still active    ================================================================  OBJECTIVE  BP (!) 152/68 (BP Location: Left arm)   Pulse 69   Temp 98.7  F (37.1  C) (Oral)   Resp 16   Ht 1.702 m (5' 7\")   Wt 112.9 kg (249 lb)   SpO2 94%   BMI 39.00 kg/m      Physical Exam  General: alert, cooperative, no apparent distress  HEENT: atraumatic, normocephalic, no scleral icterus, moist mucus membranes, no cervical lymphadenopathy  Heart: Normal rate, regular rhythm, no murmurs  Lungs: CTAB, good inspiratory effort  Abdomen: soft, non-tender, non-distended  Extremities: no peripheral edema, no new rashes or lesions. Right hip s/p surgery    Pertinent labs  No results found for: \"CRP\"   CBC RESULTS:   Recent Labs   Lab Test 11/06/24  0509 11/05/24  0623   WBC  --  9.8   RBC  --  4.27   HGB 9.3* 12.7   HCT  --  38.4   MCV  --  90   MCH  --  29.7   MCHC  --  33.1   RDW  --  13.9   PLT  --  173      Imaging  11/5 x-ray  IMPRESSION: Postoperative changes of a right total hip arthroplasty explantation with placement of an antibiotic spacer. Negative for postoperative purposes      Microbiology data  11/5/24 right hip (from OR): NGTD        I have personally reviewed the relevant laboratory, imaging, and microbiology data  ================================================================  Assessment  Ms. Kay is a 64o female with right hip PJI. PMH JOHNNIE, bipolar disorder, hx bilateral hip replacements.     She had initial right hip MISSY in 2015.  Per ortho note, in 2016 had right hip joint aspiration which showed 100,000 WBCs, but was lost to follow-up.  Based on her telling, she was never treated for what is presumably a chronic PJI. Regardless, she is now s/p hardware explantation on " 11/5 with spacer placement, cultures pending. Tentative plan is for 2nd stage implantation in ~3 months.  For now will treat as culture-negative PJI. I spoke to the micro lab to have them hold the culture longer to look for C acnes.     Impression  # Chronic Right hip PJI, unknown organism              - Initial implantation 2015              - Infection likely smoldering for long time              - s/p total explantation 11/5/24              - Tentative plan for 2nd stage reimplantation in ~3 months     ================================================================  Plan  OPAT (Outpatient Parenteral Antibiotic Therapy) Summary  - Drug: Vancomycin IV, dosing per pharmacy + Ceftriaxone 2g IV q24hr  - Duration of therapy: 6 weeks (through 12/16)  - Monitoring Labs: Weekly CBC w/differential, BUN/Cr, LFTs, CRP, vanc trough levels  - ID clinic follow up on 12/5       Dionisio Collado MD, MPH  [facility]   Office Telephone [phone].  Fax [phone]  [facility] paging

## 2024-11-07 NOTE — PLAN OF CARE
Vitals stable. PICC in place. Regular diet. Independent. Prn pain medications given (see MAR).     Radha Pinto RN on 11/6/2024 at 8:58 PM

## 2024-11-07 NOTE — PROGRESS NOTES
[facility] MEDICINE  PROGRESS NOTE     Code Status: Full Code  Procedure(s):  RIGHT TOTAL HIP ARTHROPLASTY POSTERIOR REVISION BOTH COMPONENTS  2 Days Post-Op  Identification/Summary:   Rosemary Kay is a 64 year old female with a PMH of RCC, breast cancer, JOHNNIE, CAITLYN, bipolar disorder, GERD, loosening prosthetic R hip. Underwent R hip arthroplasty revision extraction with spacer placement on 11/5/2024.  Infectious disease and pain team consulted.  Following cultures.  Anticipate 6 weeks of IV antibiotics.  PICC line placed.  Medically otherwise stable.     Assessment and Plan:     Purulent R hip joint effusion  Status post MISSY revision/spacer placement 11/5/2024  During the case gross purulence was encountered. Spacer was placed.  Prostalac antibiotic spacer: 3.6 g tobramycin, 1 g vancomycin  Cemented acetabular polyliner: 3.6 g tobramycin, 1 g vancomycin   Tentative plan is reimplantation in 12-16 weeks.   Gram stain showed 4+ WBCs.  Cultures NGTD.  Infectious disease consult appreciated.  Recommend 6 weeks of IV antibiotics.  Currently receiving cefazolin, ceftriaxone and vancomycin.  Further antibiotic management per ID/orthopedics-culture results.     Difficult pain control  Patient is having significant difficulty with pain control postoperatively.  Pain team was consulted and clinically doing much better.     Vancomycin infiltration  11/6 vancomycin did have small infiltration at the left arm.  Site was outlined.  Minimal tenderness or erythema.  Applied ice to the area and area has resolved.     Acute blood loss anemia  Preoperative hemoglobin 13.1.  Postoperatively down to 9.3--> 8.4.  No indication for transfusion at this time.  Follow per protocols.     JOHNNIE   Obesity.  BMI 39.  She does not use CPAP.  Did not require supplemental oxygen.  Follow clinically.     Hypertriglyceridemia   Noted.  Not on meds     GERD:   Continue on omeprazole.      Bipolar 1 Disorder and  "Anxiety   Not currently on meds.  Clinically appears stable.     History of Left Breast Cancer (Invasive Ductal Carcinoma):   S/P Left Lumpectomy and Radiation Treatment in 2016   Noted.     Anticoagulation   Aspirin 81 mg twice daily per orthopedics.  Standard postoperative risk.     Fluids: Saline lock  Pain meds: Scheduled Tylenol, oxycodone, Zanaflex  Therapy: Per surgery   Butler:Not present  Lines: PRESENT      PICC 11/06/24 Single Lumen Right Cephalic antibiotics-Site Assessment: WDL        Current Diet  Orders Placed This Encounter      Advance Diet as Tolerated: Regular Diet Adult    Supplements  None    Lines/Drains/Airways       PICC Line  Duration             PICC 11/06/24 Single Lumen Right Cephalic antibiotics <1 day              Wound  Duration             Incision/Surgical Site 11/05/24 Right;Lateral Hip 2 days                  Barriers to Discharge: Surgical cultures, IV antibiotics    Disposition: Potential discharge 1 to 2 days  Medically Ready for Discharge: Anticipated in 2-4 Days       Clinically Significant Risk Factors                              # Obesity: Estimated body mass index is 39 kg/m  as calculated from the following:    Height as of this encounter: 1.702 m (5' 7\").    Weight as of this encounter: 112.9 kg (249 lb)., PRESENT ON ADMISSION     # Financial/Environmental Concerns: none         Interval History/Subjective:  Patient having some increased pain this morning but after receiving pain meds doing better.  Vancomycin infiltration site looking good.  No pain or discomfort.  No chest pain.  No shortness of breath.  No nausea or vomiting.  Questions answered to verbalized satisfaction.      Last 24H PRN:     heparin 2 Units/mL in 0.9% NaCl (500 mL), 1 Bag at 11/06/24 1542    hydrOXYzine HCl (ATARAX) tablet 50 mg, 50 mg at 11/06/24 2035    lidocaine 1 % 0.1-5 mL, 2 mL at 11/06/24 1400    oxyCODONE (ROXICODONE) tablet 5 mg, 5 mg at 11/06/24 1745 **OR** oxyCODONE (ROXICODONE) tablet " 10 mg, 10 mg at 11/07/24 0646    tiZANidine (ZANAFLEX) tablet 4 mg, 4 mg at 11/06/24 0939    Physical Exam/Objective:  Temp:  [97.6  F (36.4  C)-98.7  F (37.1  C)] 98.7  F (37.1  C)  Pulse:  [69-86] 75  Resp:  [16] 16  BP: (134-165)/(64-77) 134/64  SpO2:  [93 %-98 %] 98 %  Wt Readings from Last 4 Encounters:   11/05/24 112.9 kg (249 lb)   09/13/16 112 kg (247 lb)   09/09/16 112 kg (247 lb)   08/23/16 112 kg (247 lb)     Body mass index is 39 kg/m .    Constitutional: awake, alert, cooperative, no apparent distress, and appears stated age  ENT: Normocephalic, without obvious abnormality, atraumatic, external ears without lesions, oral pharynx with moist mucous membranes, tonsils without erythema or exudates, gums normal and good dentition.  Respiratory: No increased work of breathing, good air exchange, clear to auscultation bilaterally, no crackles or wheezing  Cardiovascular: Normal apical impulse, regular rate and rhythm, normal S1 and S2, no S3 or S4, and no murmur noted  GI: No scars, normal bowel sounds, soft, non-distended, non-tender, no masses palpated, no hepatosplenomegally  Skin: normal skin color, texture, turgor, no redness, warmth, or swelling, and no rashes  Musculoskeletal: Limited exam due to postoperative status.  Good muscular tone and movement in all 4 extremities. no lower extremity pitting edema present  Neurologic: Cranial nerves II-XII are grossly intact. Sensory:  Sensory intact  Neuropsychiatric: General: normal, calm, and normal eye contact Level of consciousness: alert / normal Affect: normal Orientation: oriented to self, place, time and situation Memory and insight: normal, memory for past and recent events intact, and thought process normal      Medications:   Personally Reviewed.  Medications   Current Facility-Administered Medications   Medication Dose Route Frequency Provider Last Rate Last Admin     Current Facility-Administered Medications   Medication Dose Route Frequency  Provider Last Rate Last Admin    acetaminophen (TYLENOL) tablet 975 mg  975 mg Oral Q6H BorYolie cummins APRN CNP   975 mg at 11/07/24 0115    aspirin EC tablet 81 mg  81 mg Oral BID Natalya Dumont PA-C   81 mg at 11/06/24 1916    cefTRIAXone (ROCEPHIN) 2 g vial to attach to  ml bag for ADULTS or NS 50 ml bag for PEDS  2 g Intravenous Q24H Dionisio Collado MD   2 g at 11/06/24 1115    celecoxib (celeBREX) capsule 200 mg  200 mg Oral Daily with lunch BorYolie cummins APRN CNP   200 mg at 11/06/24 1116    gabapentin (NEURONTIN) capsule 600 mg  600 mg Oral At Bedtime BorilYolie APRN CNP   600 mg at 11/06/24 1916    lidocaine (XYLOCAINE) 5 % ointment   Topical 4x Daily Yolie Mccarthy APRN CNP   Given at 11/06/24 2035    polyethylene glycol (MIRALAX) Packet 17 g  17 g Oral Daily Natalya Dumont PA-C   17 g at 11/06/24 1018    senna-docusate (SENOKOT-S/PERICOLACE) 8.6-50 MG per tablet 1 tablet  1 tablet Oral BID Natalya Dumont PA-C   1 tablet at 11/06/24 1916    sodium chloride (PF) 0.9% PF flush 10-40 mL  10-40 mL Intracatheter Q7 Days Rios Sanches MD        sodium chloride (PF) 0.9% PF flush 3 mL  3 mL Intracatheter Q8H Natalya Dumont PA-C   3 mL at 11/07/24 0019    vancomycin (VANCOCIN) 1,500 mg in 0.9% NaCl 250 mL intermittent infusion  1,500 mg Intravenous Q12H Rios Sanches MD   1,500 mg at 11/07/24 0526       Data reviewed today: I personally reviewed all new medications, labs, imaging/diagnostics reports over the past 24 hours. Pertinent findings include:    Imaging:   Recent Results (from the past 24 hours)   XR Chest Port 1 View    Narrative    EXAM: XR CHEST PORT 1 VIEW  LOCATION: [facility]  DATE: 11/6/2024    INDICATION: RN placed PICC   verify tip placement  COMPARISON: None.      Impression    IMPRESSION:     Three portable AP images were submitted for review all show the right PICC extending through right  subclavian vein and then coursing into the low right neck in the right internal jugular vein. The tip was not included in the field-of-view. Repositioning   is suggested.    Mildly enlarged cardiac silhouette. Normal mediastinal interfaces.    Lungs are symmetrically inflated and clear. No lung edema or consolidation.    No pleural effusion or pneumothorax.   IR PICC Check Right    [facility]  IR PICC CHECK RIGHT  11/6/2024 4:10 PM CST    INDICATION: Patient with right-sided PIC catheter over the jugular vein on chest radiograph  COMPARISON: Chest radiograph earlier today      Impression    IMPRESSION: Patient is brought into the IR suite and the right PICC catheter and exit site were prepped and draped in sterile fashion. Fluoroscopy over the chest reveals the PIC catheter now in excellent position at the SVC-RA junction requiring no   repositioning. This catheter is ready for use.         No results found for this or any previous visit (from the past 4320 hours).    Labs:  IR PICC Check Right   Final Result   IMPRESSION: Patient is brought into the IR suite and the right PICC catheter and exit site were prepped and draped in sterile fashion. Fluoroscopy over the chest reveals the PIC catheter now in excellent position at the SVC-RA junction requiring no    repositioning. This catheter is ready for use.            XR Chest Port 1 View   Final Result   IMPRESSION:       Three portable AP images were submitted for review all show the right PICC extending through right subclavian vein and then coursing into the low right neck in the right internal jugular vein. The tip was not included in the field-of-view. Repositioning    is suggested.      Mildly enlarged cardiac silhouette. Normal mediastinal interfaces.      Lungs are symmetrically inflated and clear. No lung edema or consolidation.      No pleural effusion or pneumothorax.      XR Pelvis Port 1/2 Views   Final Result    IMPRESSION: Postoperative changes of a right total hip arthroplasty explantation with placement of an antibiotic spacer. Negative for postoperative purposes.         Recent Results (from the past 24 hours)   Hemoglobin    Collection Time: 11/07/24  4:10 AM   Result Value Ref Range    Hemoglobin 8.4 (L) 11.7 - 15.7 g/dL   Vancomycin level    Collection Time: 11/07/24  4:10 AM   Result Value Ref Range    Vancomycin 17.5   ug/mL   Creatinine    Collection Time: 11/07/24  4:10 AM   Result Value Ref Range    Creatinine 0.78 0.51 - 0.95 mg/dL    GFR Estimate 84 >60 mL/min/1.73m2   Glucose    Collection Time: 11/07/24  4:10 AM   Result Value Ref Range    Glucose 98 70 - 99 mg/dL       Pending Labs:  Unresulted Labs Ordered in the Past 30 Days of this Admission       Date and Time Order Name Status Description    11/5/2024  8:18 AM Abscess Aerobic Bacterial Culture Routine Preliminary     11/5/2024  8:18 AM Anaerobic Bacterial Culture Routine Preliminary               Wm Looney MD  [facility]  Phone: #[phone]

## 2024-11-08 ENCOUNTER — HOME INFUSION BILLING (OUTPATIENT)
Dept: HOME HEALTH SERVICES | Facility: HOME HEALTH | Age: 64
End: 2024-11-08
Payer: COMMERCIAL

## 2024-11-08 ENCOUNTER — ENROLLMENT (OUTPATIENT)
Dept: HOME HEALTH SERVICES | Facility: HOME HEALTH | Age: 64
End: 2024-11-08
Payer: MEDICARE

## 2024-11-08 ENCOUNTER — HOME INFUSION BILLING (OUTPATIENT)
Dept: HOME HEALTH SERVICES | Facility: HOME HEALTH | Age: 64
End: 2024-11-08
Payer: MEDICARE

## 2024-11-08 VITALS
HEART RATE: 89 BPM | HEIGHT: 67 IN | RESPIRATION RATE: 18 BRPM | DIASTOLIC BLOOD PRESSURE: 64 MMHG | TEMPERATURE: 97.8 F | BODY MASS INDEX: 39.08 KG/M2 | WEIGHT: 249 LBS | OXYGEN SATURATION: 96 % | SYSTOLIC BLOOD PRESSURE: 142 MMHG

## 2024-11-08 PROCEDURE — 250N000011 HC RX IP 250 OP 636: Performed by: ORTHOPAEDIC SURGERY

## 2024-11-08 PROCEDURE — 99232 SBSQ HOSP IP/OBS MODERATE 35: CPT | Performed by: NURSE PRACTITIONER

## 2024-11-08 PROCEDURE — A9270 NON-COVERED ITEM OR SERVICE: HCPCS | Mod: GY

## 2024-11-08 PROCEDURE — 99232 SBSQ HOSP IP/OBS MODERATE 35: CPT | Performed by: INTERNAL MEDICINE

## 2024-11-08 PROCEDURE — 250N000011 HC RX IP 250 OP 636: Performed by: INTERNAL MEDICINE

## 2024-11-08 PROCEDURE — 250N000013 HC RX MED GY IP 250 OP 250 PS 637: Performed by: NURSE PRACTITIONER

## 2024-11-08 PROCEDURE — A4223 INFUSION SUPPLIES W/O PUMP: HCPCS | Mod: GY

## 2024-11-08 PROCEDURE — 250N000013 HC RX MED GY IP 250 OP 250 PS 637

## 2024-11-08 PROCEDURE — S9500 HIT ANTIBIOTIC Q24H DIEM: HCPCS | Mod: GY

## 2024-11-08 RX ORDER — ASPIRIN 81 MG/1
81 TABLET ORAL 2 TIMES DAILY
Qty: 60 TABLET | Refills: 0 | Status: SHIPPED | OUTPATIENT
Start: 2024-11-08

## 2024-11-08 RX ORDER — CEFTRIAXONE SODIUM 10 G/100ML
2 INJECTION, POWDER, FOR SOLUTION INTRAVENOUS EVERY 24 HOURS
Qty: 760 ML | Refills: 0 | Status: DISPENSED | OUTPATIENT
Start: 2024-11-08 | End: 2024-12-16

## 2024-11-08 RX ORDER — HYDROXYZINE HYDROCHLORIDE 50 MG/1
50 TABLET, FILM COATED ORAL EVERY 6 HOURS PRN
Qty: 20 TABLET | Refills: 0 | Status: SHIPPED | OUTPATIENT
Start: 2024-11-08

## 2024-11-08 RX ORDER — OXYCODONE HYDROCHLORIDE 5 MG/1
5 TABLET ORAL
Qty: 30 TABLET | Refills: 0 | Status: SHIPPED | OUTPATIENT
Start: 2024-11-08

## 2024-11-08 RX ORDER — LIDOCAINE 50 MG/G
OINTMENT TOPICAL 4 TIMES DAILY
Qty: 50 G | Refills: 0 | Status: SHIPPED | OUTPATIENT
Start: 2024-11-08

## 2024-11-08 RX ORDER — ACETAMINOPHEN 325 MG/1
975 TABLET ORAL EVERY 6 HOURS
COMMUNITY
Start: 2024-11-08

## 2024-11-08 RX ORDER — FERROUS SULFATE 325(65) MG
325 TABLET, DELAYED RELEASE (ENTERIC COATED) ORAL EVERY OTHER DAY
Qty: 60 TABLET | Refills: 0 | Status: SHIPPED | OUTPATIENT
Start: 2024-11-08

## 2024-11-08 RX ADMIN — OXYCODONE 5 MG: 5 TABLET ORAL at 09:35

## 2024-11-08 RX ADMIN — OXYCODONE 5 MG: 5 TABLET ORAL at 02:39

## 2024-11-08 RX ADMIN — VANCOMYCIN HYDROCHLORIDE 1000 MG: 1 INJECTION, SOLUTION INTRAVENOUS at 05:39

## 2024-11-08 RX ADMIN — ACETAMINOPHEN 975 MG: 325 TABLET ORAL at 09:10

## 2024-11-08 RX ADMIN — TIZANIDINE 4 MG: 4 TABLET ORAL at 04:43

## 2024-11-08 RX ADMIN — ACETAMINOPHEN 975 MG: 325 TABLET ORAL at 02:39

## 2024-11-08 RX ADMIN — ASPIRIN 81 MG: 81 TABLET, COATED ORAL at 08:08

## 2024-11-08 RX ADMIN — HYDROXYZINE HYDROCHLORIDE 50 MG: 25 TABLET, FILM COATED ORAL at 09:35

## 2024-11-08 RX ADMIN — LIDOCAINE: 50 OINTMENT TOPICAL at 08:12

## 2024-11-08 RX ADMIN — CELECOXIB 200 MG: 200 CAPSULE ORAL at 12:22

## 2024-11-08 RX ADMIN — POLYETHYLENE GLYCOL 3350 17 G: 17 POWDER, FOR SOLUTION ORAL at 08:09

## 2024-11-08 RX ADMIN — CEFTRIAXONE SODIUM 2 G: 2 INJECTION, POWDER, FOR SOLUTION INTRAMUSCULAR; INTRAVENOUS at 09:10

## 2024-11-08 RX ADMIN — OXYCODONE 5 MG: 5 TABLET ORAL at 12:54

## 2024-11-08 RX ADMIN — SENNOSIDES AND DOCUSATE SODIUM 1 TABLET: 50; 8.6 TABLET ORAL at 08:08

## 2024-11-08 ASSESSMENT — ACTIVITIES OF DAILY LIVING (ADL)
ADLS_ACUITY_SCORE: 0

## 2024-11-08 NOTE — PROGRESS NOTES
"Orthopedic Progress Note      Assessment: 3 Day Post-Op  S/P Procedure(s):  RIGHT TOTAL HIP ARTHROPLASTY POSTERIOR REVISION BOTH COMPONENTS @    Plan:   - Posterior Hip Precautions  - Continue PT/OT  - Weightbearing status: WBAT with use of a walker  - Anticoagulation: ASA 81 mg BID in addition to SCDs, jerri stockings and early ambulation.  - Pending cultures (Gram stain showed 4+ WBCs). Appreciate ID recs  - Prostalac antibiotic spacer: 3.6 g tobramycin, 1 g vancomycin  - Cemented acetabular polyliner: 3.6 g tobramycin, 1 g vancomycin  - Currently receiving ceftriaxone and vancomycin.  - Discharge planning: today pending infusion education and medical clearance. Cleared from ortho standpoint.      Subjective:  Pain: moderate, controlled on oral Oxycodone, Tizanidine, Acetaminophen, Gabapentin, and Celebrex  Chest pain, SOB: No  Nausea, Vomiting:  No  Lightheadedness, Dizziness:  No  Neuro:  Patient denies new onset numbness or paresthesias    Patient doing well POD#3 revision right MISSY. She was very anxious and overwhelmed on when I first went in today. She wants to go home today but is feeling nervous about it. I reassured her that she has been doing great from an ortho standpoint, and that she hopefully will feel less anxious after receiving her infusion education today. She was able to calm her breathing and reassure herself that she is strong  enough and capable to go home today. Hgb 8.4 yesterday (9.3 yesterday). She is asymptomatic but we will continue to monitor. Ambulating, tolerating oral intake, voiding & pain is tolerable with oral medication. She is complaining of some abdominal pain but believes it is because she has still yet to have a BM.     Objective:  /59 (BP Location: Right arm)   Pulse 74   Temp 98.9  F (37.2  C) (Oral)   Resp 19   Ht 1.702 m (5' 7\")   Wt 112.9 kg (249 lb)   SpO2 95%   BMI 39.00 kg/m    The patient is A&Ox3. Appears comfortable.   Sensation is intact.  Dorsiflexion " and plantar flexion is intact.  Dorsalis pedis pulse intact.  Calves are soft and non-tender. Negative Phoenix's.  The incision is covered. Dressing C/D/I.    Drain removed yesterday    Pertinent Labs   Lab Results: personally reviewed.   Lab Results   Component Value Date    INR 1.03 11/05/2024     Lab Results   Component Value Date    WBC 9.8 11/05/2024    HGB 8.4 (L) 11/07/2024    HCT 38.4 11/05/2024    MCV 90 11/05/2024     11/05/2024     Lab Results   Component Value Date     10/25/2024    CO2 26 10/25/2024         Report completed by:  Samira Pedroza PA-C/Dr. Sanches  [facility]    Date: 11/8/2024  Time: 8:59 AM

## 2024-11-08 NOTE — PLAN OF CARE
Problem: Adult Inpatient Plan of Care  Goal: Optimal Comfort and Wellbeing  11/8/2024 1401 by Nikki Wolf RN  Outcome: Met  11/8/2024 0958 by Nikki Wolf RN  Outcome: Progressing  Intervention: Monitor Pain and Promote Comfort  Recent Flowsheet Documentation  Taken 11/8/2024 0800 by Nikki Wolf RN  Pain Management Interventions: medication (see MAR)     Problem: Hip Arthroplasty  Goal: Effective Bowel Elimination  Outcome: Met  Intervention: Enhance Bowel Motility and Elimination  Recent Flowsheet Documentation  Taken 11/8/2024 0800 by Nikki Wolf RN  Bowel Motility Enhancement:   fluid intake encouraged   ambulation promoted     Problem: Hip Arthroplasty  Goal: Optimal Pain Control and Function  Outcome: Met  Intervention: Prevent or Manage Pain  Recent Flowsheet Documentation  Taken 11/8/2024 0800 by Nikki Wolf RN  Pain Management Interventions: medication (see MAR)  Complementary Therapy: music therapy provided  Diversional Activities:   television   smartphone   music       Pt A&Ox4, anxious and emotional. VSS on RA. Denies SOB, N/V, dizziness, HA, numbness or tingling. Dressing c/d/I. Pain controlled by 5mg Oxycodone given x2 for pain 4-5/10. 1x PRN 50mg atarax also given to help for itching and anxiety. It was effective as pt stated pain 1-2/10. Pt also reported having 1x BM this shift. Finished home infusion education with the nurse. Independently ambulate in the room. Pt stable and ready to go home with her ride.

## 2024-11-08 NOTE — PLAN OF CARE
Problem: Adult Inpatient Plan of Care  Goal: Absence of Hospital-Acquired Illness or Injury  Intervention: Identify and Manage Fall Risk  Recent Flowsheet Documentation  Taken 11/7/2024 2000 by Dana Rodriguez RN  Safety Promotion/Fall Prevention:   safety round/check completed   activity supervised   mobility aid in reach   nonskid shoes/slippers when out of bed   patient and family education   room door open   room near nurse's station     Problem: Adult Inpatient Plan of Care  Goal: Absence of Hospital-Acquired Illness or Injury  Intervention: Prevent Skin Injury  Recent Flowsheet Documentation  Taken 11/7/2024 2000 by Dana Rodriguez RN  Body Position:   position changed independently   weight shifting   Goal Outcome Evaluation:      Plan of Care Reviewed With: patient    Overall Patient Progress: improvingOverall Patient Progress: improving  Patient vital signs are at baseline: Yes  Patient able to ambulate as they were prior to admission or with assist devices provided by therapies during their stay:  Yes  Patient MUST void prior to discharge:  Yes  Patient able to tolerate oral intake:  Yes  Pain has adequate pain control using Oral analgesics:  Yes  Does patient have an identified :  Yes  Has goal D/C date and time been discussed with patient:  Yes  Pt alert and oriented, vss on room air, can voice needs, pain meds given, ambulating well, pt is awaiting education on abx administration at home then will be discharged.

## 2024-11-08 NOTE — DISCHARGE SUMMARY
[facility] MEDICINE  DISCHARGE SUMMARY     Primary Care Physician: Estela Jiménez  Admission Date: 11/5/2024   Discharge Provider: Lesvia Flannery MD Discharge Date: 11/8/2024   Diet: Orders Placed This Encounter      Advance Diet as Tolerated: Regular Diet Adult      Discharge Instruction - Regular Diet Adult      Code Status: Full Code   Activity: activity as tolerated   [facility]      Condition at Discharge: Stable      REASON FOR PRESENTATION(See Admission Note for Details)   Elective right total hip arthroplasty revision as to extraction with antibiotic spacer placement for prosthetic hip infection    PRINCIPAL & ACTIVE DISCHARGE DIAGNOSES     Principal Problem:    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)  Active Problems:    S/P revision of total hip with antibiotic spacer placement  Acute blood loss anemia  JOHNNIE not on CPAP  Obesity  GERD  Anxiety, bipolar disorder      SIGNIFICANT FINDINGS (Imaging, labs):     Most Recent 3 CBC's:  Recent Labs   Lab Test 11/07/24 0410 11/06/24  0509 11/05/24  0623 10/25/24  0858 09/12/24  0824   WBC  --   --  9.8 7.4 6.9   HGB 8.4* 9.3* 12.7 13.1 12.2   MCV  --   --  90 94 94   PLT  --   --  173 211 193      Most Recent 3 BMP's:  Recent Labs   Lab Test 11/07/24 0410 11/06/24  0509 11/05/24  0630 11/05/24  0623 10/25/24  0858 09/12/24  0824 08/30/24  0926 01/29/24  1040   NA  --   --   --   --  141 142  --  141   POTASSIUM  --   --   --  3.7 4.6 4.2  --  4.0   CHLORIDE  --   --   --   --  103 106  --  104   CO2  --   --   --   --  26 26  --  27   BUN  --   --   --   --  12.1 12.6 14.9 12.9   CR 0.78 0.70  --  0.63 0.68 0.70 0.66 0.61   ANIONGAP  --   --   --   --  12 10  --  10   RAMON  --   --   --   --  9.3 9.2  --  9.3   GLC 98 139* 114*  --  96 104*  --  113*     Most Recent 2 LFT's:  Recent Labs   Lab Test 01/29/24  1040   AST 19   ALT 19   ALKPHOS 123   BILITOTAL 0.2     Recent Labs   Lab Test 08/30/24  0926   A1C 6.2*     Results  for orders placed or performed during the hospital encounter of 11/05/24   XR Pelvis Port 1/2 Views    Narrative    EXAM: XR PELVIS PORT 1/2 VIEWS  LOCATION: [facility]  DATE: 11/5/2024    INDICATION: Status post Hip surgery   posterior approach   COMPARISON: 2/14/2024       Impression    IMPRESSION: Postoperative changes of a right total hip arthroplasty explantation with placement of an antibiotic spacer. Negative for postoperative purposes.    XR Chest Port 1 View    Narrative    EXAM: XR CHEST PORT 1 VIEW  LOCATION: [facility]  DATE: 11/6/2024    INDICATION: RN placed PICC   verify tip placement  COMPARISON: None.      Impression    IMPRESSION:     Three portable AP images were submitted for review all show the right PICC extending through right subclavian vein and then coursing into the low right neck in the right internal jugular vein. The tip was not included in the field-of-view. Repositioning   is suggested.    Mildly enlarged cardiac silhouette. Normal mediastinal interfaces.    Lungs are symmetrically inflated and clear. No lung edema or consolidation.    No pleural effusion or pneumothorax.   IR PICC Check Right    Narrative    [facility]  IR PICC CHECK RIGHT  11/6/2024 4:10 PM CST    INDICATION: Patient with right-sided PIC catheter over the jugular vein on chest radiograph  COMPARISON: Chest radiograph earlier today      Impression    IMPRESSION: Patient is brought into the IR suite and the right PICC catheter and exit site were prepped and draped in sterile fashion. Fluoroscopy over the chest reveals the PIC catheter now in excellent position at the SVC-RA junction requiring no   repositioning. This catheter is ready for use.            PENDING LABS         PROCEDURES ( this hospitalization only)      Procedure(s):  RIGHT TOTAL HIP ARTHROPLASTY POSTERIOR REVISION BOTH COMPONENTS    RECOMMENDATION FOR F/U VISIT          DISPOSITION     Home with home care    SUMMARY OF HOSPITAL COURSE:    64 year old female with a PMH of RCC, breast cancer, JOHNNIE, CAITLYN, bipolar disorder, GERD, loosening prosthetic R hip. Underwent R hip arthroplasty revision extraction with spacer placement on 11/5/2024. Infectious disease and pain team consulted.  Gross purulence was noted intraoperatively.  Cultures so far negative.  She is discharging on ceftriaxone, vancomycin via PICC line.  She will have weekly labs and fax to infectious diseases.  Has developed acute blood loss anemia with a hemoglobin down to 8-9.  She is asymptomatic.  Started on oral iron.  She is discharging home with home care for IV antibiotics.  Tentative plan for reimplantation of the right hip pain 12 to 16 weeks.      Discharge Medications with Med changes:        Review of your medicines        START taking        Dose / Directions   acetaminophen 325 MG tablet  Commonly known as: TYLENOL      Dose: 975 mg  Take 3 tablets (975 mg) by mouth every 6 hours.  Refills: 0     aspirin 81 MG EC tablet  Indication: VTE Prophylaxis      Dose: 81 mg  Take 1 tablet (81 mg) by mouth 2 times daily.  Quantity: 60 tablet  Refills: 0     cefTRIAXone 2 GM vial  Commonly known as: ROCEPHIN  Indication: Infection Following Surgery      Dose: 2 g  Inject 2 g over 30 minutes into the vein every 24 hours.  Refills: 0     hydrOXYzine HCl 50 MG tablet  Commonly known as: ATARAX      Dose: 50 mg  Take 1 tablet (50 mg) by mouth every 6 hours as needed for anxiety.  Quantity: 20 tablet  Refills: 0     lidocaine 5 % external ointment  Commonly known as: XYLOCAINE      Apply topically 4 times daily.  Quantity: 50 g  Refills: 0     oxyCODONE 5 MG tablet  Commonly known as: ROXICODONE      Dose: 5 mg  Take 1 tablet (5 mg) by mouth every 3 hours as needed for moderate pain.  Quantity: 30 tablet  Refills: 0     vancomycin 1500 mg/250 mL IVPB  Commonly known as: VANCOCIN  Indication: POSSIBLE POST-OP  INFECTION      Dose: 1,000 mg  Inject 1,000 mg over 90 minutes into the vein every 12 hours.  Refills: 0            CONTINUE these medicines which have NOT CHANGED        Dose / Directions   albuterol 108 (90 Base) MCG/ACT inhaler  Commonly known as: PROAIR HFA/PROVENTIL HFA/VENTOLIN HFA      Dose: 2 puff  Inhale 2 puffs into the lungs every 6 hours as needed for shortness of breath, wheezing or cough.  Refills: 0     celecoxib 200 MG capsule  Commonly known as: celeBREX      Dose: 200 mg  Take 200 mg by mouth daily.  Refills: 0     * gabapentin 300 MG capsule  Commonly known as: NEURONTIN      Dose: 600 mg  [GABAPENTIN (NEURONTIN) 300 MG CAPSULE] Take 600 mg by mouth bedtime.  Refills: 0     * gabapentin 300 MG capsule  Commonly known as: NEURONTIN      Dose: 300 mg  Take 300 mg by mouth daily as needed.  Refills: 0     priLOSEC OTC 20 MG EC tablet  Generic drug: omeprazole      Dose: 20 mg  Take 20 mg by mouth daily as needed.  Refills: 0     tiZANidine 2 MG tablet  Commonly known as: ZANAFLEX      Dose: 2-4 mg  Take 2-4 mg by mouth nightly as needed.  Refills: 0           * This list has 2 medication(s) that are the same as other medications prescribed for you. Read the directions carefully, and ask your doctor or other care provider to review them with you.                   Where to get your medicines        These medications were sent to [id number] - [place], [place] - [address] AT [facility] & [address]  [address], [place] [address]      Phone: [phone]   aspirin 81 MG EC tablet  hydrOXYzine HCl 50 MG tablet  lidocaine 5 % external ointment  oxyCODONE 5 MG tablet       Some of these will need a paper prescription and others can be bought over the counter. Ask your nurse if you have questions.    You don't need a prescription for these medications  acetaminophen 325 MG tablet              Rationale for medication changes:              Consults   Hospitalist, ID, pain  "team      Immunizations given this encounter     [unfilled]      Discharge Procedure Orders   Creatinine   Standing Status: Standing Number of Occurrences: 6 Standing Exp. Date: 01/07/25     Urea Nitrogen (BUN)   Standing Status: Standing Number of Occurrences: 6 Standing Exp. Date: 01/07/25     Hepatic function panel   Standing Status: Standing Number of Occurrences: 6 Standing Exp. Date: 01/07/25     Vancomycin level   Standing Status: Standing Number of Occurrences: 6 Standing Exp. Date: 01/07/25     Reason for your hospital stay   Order Comments: Total hip arthroplasty revision surgery, right.     Brief Discharge Instructions   Order Comments: Please inform patient to refer to the 4\"x6\" Dr Sanches post op sheet with contact info and instructions for any additional questions or direction on recommendations on activities and Exercises.  HALLE socks on throughout daytime hours. Nighttime use optional.     When to call - Contact Surgeon Team   Order Comments: You may experience symptoms that require follow-up before your scheduled appointment. Refer to the \"Stoplight Tool\" for instructions on when to contact your Surgeon Team if you are concerned about pain control, blood clots, constipation, or if you are unable to urinate.     When to call - Reach out to Urgent Care   Order Comments: If you are not able to reach your Surgeon Team and you need immediate care, go to the Orthopedic Walk-in Clinic or Urgent Care at your Surgeon's office.  Do NOT go to the Emergency Room unless you have shortness of breath, chest pain, or other signs of a medical emergency.     When to call - Reasons to Call 911   Order Comments: Call 911 immediately if you experience sudden-onset chest pain, arm weakness/numbness, slurred speech, or shortness of breath     Discharge Instruction - Breathing exercises   Order Comments: Perform breathing exercises using your Incentive Spirometer 10 times per hour while awake for 2 weeks.     Symptoms " - Fever Management   Order Comments: A low grade fever can be expected after surgery.  Use acetaminophen (TYLENOL) as needed for fever management.  Contact your Surgeon Team if you have a fever greater than 101.5 F, chills, and/or night sweats.     Symptoms - Constipation management   Order Comments: Constipation (hard, dry bowel movements) is expected after surgery due to the combination of being less active, the anesthetic, and the opioid pain medication.  You can do the following to help reduce constipation:  ~  FLUIDS:  Drink clear liquids (water or Gatorade), or juice (apple/prune).  ~  DIET:  Eat a fiber rich diet.    ~  ACTIVITY:  Get up and move around several times a day.  Increase your activity as you are able.  MEDICATIONS:  Reduce the risk of constipation by starting medications before you are constipated.  You can take Miralax   (1 packet as directed) and/or a stool softener (Senokot 1-2 tablets 1-2 times a day).  If you already have constipation and these medications are not working, you can get magnesium citrate and use as directed.  If you continue to have constipation you can try an over the counter suppository or enema.  Call your Surgeon Team if it has been greater than 3 days since your last bowel movement.     Symptoms - Reduced Urine Output   Order Comments: Changes in the amount of fluids you drank before and after surgery may result in problems urinating.  It is important to stay well-hydrated after surgery and drink plenty of water. If it has been greater than 8 hours since you have urinated despite drinking plenty of water, call your Surgeon Team.     Activity - Exercises to prevent blood clots   Order Comments: Unless otherwise directed by your Surgeon team, perform the following exercises at least three times per day for the first four weeks after surgery to prevent blood clots in your legs: 1) Point and flex your feet (Ankle Pumps), 2) Move your ankle around in big circles, 3) Wiggle  "your toes, 4) Walk, even for short distances, several times a day, will help decrease the risk of blood clots.     Order Specific Question Answer Comments   Is discharge order? Yes      Comfort and Pain Management - Pain after Surgery   Order Comments: Pain after surgery is normal and expected.  You will have some amount of pain for several weeks after surgery.  Your pain will improve with time.  There are several things you can do to help reduce your pain including: rest, ice, elevation, and using pain medications as needed. Contact your Surgeon Team if you have pain that persists or worsens after surgery despite rest, ice, elevation, and taking your medication(s) as prescribed. Contact your Surgeon Team if you have new numbness, tingling, or weakness in your operative extremity.     Comfort and Pain Management - Swelling after Surgery   Order Comments: Swelling and/or bruising of the surgical extremity is common and may persist for several months after surgery. In addition to frequent icing and elevation, gentle compressive support with an ACE wrap or tubigrip may help with swelling. Apply compression regularly, removing at least twice daily to perform skin checks. Contact your Surgeon Team if your swelling increases and is NOT associated with an increase in your activity level, or if your swelling increases and is associated with redness and pain.     Comfort and Pain Management - LOWER Extremity Elevation   Order Comments: Swelling is expected for several months after surgery. This type of swelling is usually associated with gravity and activity, and can be improved with elevation.   The best way to do this is to get your \"toes above your nose\" by laying down and placing several pillows lengthwise under your calf and heel. When elevating your leg keep your knee completely straight. Perform this elevation as often as possible especially for the first two weeks after surgery.     Comfort and Pain Management - Cold " therapy   Order Comments: Ice can be used to control swelling and discomfort after surgery. Place a thin towel over your operative site and apply the ice pack overtop. Leave ice pack in place for 20 minutes, then remove for 20 minutes. Repeat this 20 minutes on/20 minutes off routine as often as tolerated.     Medication Instructions - Acetaminophen (TYLENOL) Instructions   Order Comments: You were discharged with acetaminophen (TYLENOL) for pain management after surgery. Acetaminophen most effectively manages pain symptoms when it is taken on a schedule without missing doses (every four, six, or eight hours). Your Provider will prescribe a safe daily dose between 3000 - 4000 mg.  Do NOT exceed this daily dose. Most patients use acetaminophen for pain control for the first four weeks after surgery.  You can wean from this medication as your pain decreases.     Medication Instructions - NSAID Instructions   Order Comments: You were discharged with an anti-inflammatory medication for pain management to use in combination with acetaminophen (TYLENOL) and the narcotic pain medication.  Take this medication exactly as directed.  You should only take one anti-inflammatory at a time.  Some common anti-inflammatories include: ibuprofen (ADVIL, MOTRIN), naproxen (ALEVE, NAPROSYN), celecoxib (CELEBREX), meloxicam (MOBIC), ketorolac (TORADOL).  Take this medication with food and water.     Opioid Instructions (Greater than or equal to 65 years)   Order Comments: You were discharged with an opioid medication (hydromorphone, oxycodone, hydrocodone, or tramadol). This medication should only be taken for breakthrough pain that is not controlled with acetaminophen (TYLENOL). If you rate your pain less than 3 you do not need this medication. Pain rating 0-3: You do not need this medication Pain rating 4-6: Take 1/2 tablet every 4-6 hours as needed Pain rating 7-10: Take 1 tablet every 4-6 hours as needed Do not exceed 4 tablets per  day     Medication Instructions - Opioids - Tapering Instructions   Order Comments: In the first three days following surgery, your symptoms may warrant use of the narcotic pain medication every four to six hours as prescribed. This is normal. As your pain symptoms improve, focus your efforts on decreasing (tapering) use of narcotic medications. The most successful tapering strategy is to first, decrease the number of tablets you take every 4-6 hours to the minimum prescribed. Then, increase the amount of time between doses. For example: First, taper to   or 1 tablet every 4-6 hours. Then, taper to   or 1 tablet every 6-8 hours. Then, taper to   or 1 tablet every 8-10 hours. Then, taper to   or 1 tablet every 10-12 hours. Then, taper to   or 1 tablet at bedtime. The bedtime dose can help with comfort during sleep and is typically the last dose to be discontinued after surgery.     Follow Up Care   Order Comments: Follow-up with your Surgeon Team 10-14 days for total hip arthroplasty surgeries for wound check, dressing removal and post op check     Activity - Total Hip Arthroplasty   Order Comments: Refer to Dr. Sanches's post op instruction sheet for recommendations on activities and Exercises.     Order Specific Question Answer Comments   Is discharge order? Yes      Return to Driving   Order Comments: Return to driving - Driving is NOT permitted until directed by your provider. Under no circumstance are you permitted to drive while using narcotic pain medications.     Order Specific Question Answer Comments   Is discharge order? Yes      Return to School / Work   Order Comments: Return to School / Work: You may return to work/school when directed by your Provider.     Order Specific Question Answer Comments   Is discharge order? Yes      No flexion past 90 degrees   Order Comments: No bending at waist past 90 degrees.     Order Specific Question Answer Comments   Is discharge order? Yes      No internal rotation    Order Comments: No pivoting on your operative leg.     Order Specific Question Answer Comments   Is discharge order? Yes      No external rotation   Order Comments: No pivoting on your operative leg.     Order Specific Question Answer Comments   Is discharge order? Yes      No adduction past midline   Order Comments: No crossing your operative leg.     Order Specific Question Answer Comments   Is discharge order? Yes      No active abduction   Order Comments: No lifting your operative leg to the side.     Order Specific Question Answer Comments   Is discharge order? Yes      No hyperextension   Order Comments: No kicking-back or lunging with operative leg.     Order Specific Question Answer Comments   Is discharge order? Yes      Weight bearing as tolerated   Order Comments: Weight bearing as tolerated on your operative extremity.     Order Specific Question Answer Comments   Is discharge order? Yes      Dressing / Wound Care - Wound   Order Comments: You have a clean dressing on your surgical wound. DO NOT REMOVE SURGICAL DRESSING. Dressing change instructions as follows: dressing will be removed at your follow-up appointment. Contact your Surgeon Team if you have increased redness, warmth around the surgical wound, and/or drainage from the surgical wound.     Dressing / Wound care - Shower with wound/dressing covered   Order Comments: You must COVER your dressing or incision with PRESS 'N' SEAL wrap (or any other non-permeable covering) to allow the incision to remain dry while showering.  You may shower 2 days after surgery as long as the surgical wound stays dry. Continue to cover your dressing or incision for showering until your first office visit.  You are strictly prohibited from soaking   or submerging the surgical wound underwater.     Dressing / Wound Care - NO Tub Bathing   Order Comments: Tub bathing, swimming, or any other activities that will cause your incision to be submerged in water should be  avoided until allowed by your Surgeon.     Medication instructions -  Anticoagulation - aspirin   Order Comments: Take the aspirin 81 mg twice a day as prescribed for a total of 60 days after surgery.  This is given to help minimize your risk of blood clot.     Cane DME   Order Comments: DME Documentation: Describe the reason for need to support medical necessity: Impaired gait status post hip surgery. I, the undersigned, certify that the above prescribed supplies are medically necessary for this patient and is both reasonable and necessary in reference to accepted standards of medical practice in the treatment of this patient's condition and is not prescribed as a convenience.     Order Specific Question Answer Comments   Medical Equipment (DME) Supplier: [employer]    PATIENT INSTRUCTIONS: If you did not receive this ordered item today, please contact [employer] for availability ([place] Locations: [phone], [place]: [phone]).    Cane Type: Single Tip    Reminder: Patient can typically get 1 every 5 years      Walker DME   Order Comments: DME Documentation: Describe the reason for need to support medical necessity: Impaired gait status post hip surgery. I, the undersigned, certify that the above prescribed supplies are medically necessary for this patient and is both reasonable and necessary in reference to accepted standards of medical practice in the treatment of this patient's condition and is not prescribed as a convenience.     Order Specific Question Answer Comments   Medical Equipment (DME) Supplier: [employer]    PATIENT INSTRUCTIONS: If you did not receive this ordered item today, please contact [employer] for availability ([place] Locations: [phone], [place]: [phone]).    Walker Type: Standard (2 Wheel)    Accessories: N/A      Discharge Instruction - Regular Diet Adult   Order Comments: Return to your  "pre-surgery diet unless instructed otherwise     Order Specific Question Answer Comments   Is discharge order? Yes      CBC with Platelets & Differential   Standing Status: Standing Number of Occurrences: 6 Standing Exp. Date: 01/07/25     Examination     Vital Signs in last 24 hours:   Vital signs:  Temp: 98.9  F (37.2  C) Temp src: Oral BP: 125/59 Pulse: 74   Resp: 19 SpO2: 95 % O2 Device: None (Room air) Oxygen Delivery: 2 LPM Height: 170.2 cm (5' 7\") Weight: 112.9 kg (249 lb)  Estimated body mass index is 39 kg/m  as calculated from the following:    Height as of this encounter: 1.702 m (5' 7\").    Weight as of this encounter: 112.9 kg (249 lb).       Pertinent positives on exam:  Musculoskeletal right hip dressing looks clean  Heart S1-S2 heard regular rate and rhythm  Lungs: Clear to auscultation bilaterally  Extremities: No edema.    Please see EMR for more detailed significant labs, imaging, consultant notes etc.  Total time spent on discharge: > 35 minutes    Lesvia Flannery MD   [facility] Hospitalist Service: Ph:[phone]    CC:Estela Jiménez      "

## 2024-11-08 NOTE — PROGRESS NOTES
[facility]    Writer spoke with pt to review benefits, home infusion and to offer choice of agency/home infusion provider.  Pt would like to go with [facility] for home infusion needs. Writer will meet with pt today around 1-2 pm for home infusion teach.  Pt has had today's Ceftriaxone dose and her am dose of Vanco.  Pt will next dose her Vanco at home tonight at 9 pm.  She will then dose at 9a-9p at home.  [facility] will be providing pt's home nursing visits. Updated Lelia Pedersen RN CM.    Thank you for the referral.    Erica Nova RN, BSN  [facility] Liaison  [phone] (Mon through Fri, 8:00 am-5:00 pm)  [phone] (Office)    [facility] EDUC[facility]    Met with patient at bedside for teach of IV antibiotic via SL valved PICC.  Plan is for patient to discharge on Ceftriaxone 2g q 24 hours and Vanco 1g q 12 hours. Pt will next dose Vanco tonight at home around 9 pm.    Provided hands-on teaching using teaching mat and demo equipment.  Patient taught SAS  method and was able to provide return demonstration using aseptic technique.  Extension tubing was added to her  PICC.  IV catheter flushed without resistance and had good blood return.  Delivery of med and supplies will be delivered to patient's home tonight between 6-9 pm.  A nurse  will contact patient to set up the first home nursing visit. [facility] will provide home infusion visits.    Provided 24/7 phone number and answered all questions.  Patient verbalized understanding of discharge plans.  Thank you for the opportunity to provide infusion services to this patient. Updated pt's bedside RN and Lelia Pedersen RN CM.

## 2024-11-08 NOTE — PLAN OF CARE
Problem: Hip Arthroplasty  Goal: Optimal Functional Ability  Outcome: Progressing  Intervention: Promote Optimal Functional Status    Problem: Adult Inpatient Plan of Care  Goal: Optimal Comfort and Wellbeing  Outcome: Progressing  Intervention: Monitor Pain and Promote Comfort       Patient vital signs are at baseline: Yes  Patient able to ambulate as they were prior to admission or with assist devices provided by therapies during their stay:  Yes, up independently in room   Patient MUST void prior to discharge:  Yes  Patient able to tolerate oral intake:  Yes  Pain has adequate pain control using Oral analgesics:  Yes, PRN oxycodone, PRN atarax, and PRN tizanidine given  Does patient have an identified :  Yes  Has goal D/C date and time been discussed with patient:  Yes

## 2024-11-08 NOTE — PROGRESS NOTES
AVS reviewed by this RN. All questions answered. Patient to discharge home after home infusion education complete.

## 2024-11-08 NOTE — PROGRESS NOTES
"INFECTIOUS DISEASE FOLLOW UP NOTE    Date: 11/08/2024   CHIEF COMPLAINT: No chief complaint on file.       ================================================================  SUBJECTIVE  Feeling okay today. Pain under passable control, but still active    ================================================================  OBJECTIVE  BP (!) 142/64 (BP Location: Right arm)   Pulse 89   Temp 97.8  F (36.6  C) (Oral)   Resp 18   Ht 1.702 m (5' 7\")   Wt 112.9 kg (249 lb)   SpO2 96%   BMI 39.00 kg/m      Physical Exam  General: alert, cooperative, no apparent distress  HEENT: atraumatic, normocephalic, no scleral icterus, moist mucus membranes, no cervical lymphadenopathy  Heart: Normal rate, regular rhythm, no murmurs  Lungs: CTAB, good inspiratory effort  Abdomen: soft, non-tender, non-distended  Extremities: no peripheral edema, no new rashes or lesions. Right hip s/p surgery    Pertinent labs  No results found for: \"CRP\"   CBC RESULTS:   Recent Labs   Lab Test 11/06/24  0509 11/05/24  0623   WBC  --  9.8   RBC  --  4.27   HGB 9.3* 12.7   HCT  --  38.4   MCV  --  90   MCH  --  29.7   MCHC  --  33.1   RDW  --  13.9   PLT  --  173      Imaging  11/5 x-ray  IMPRESSION: Postoperative changes of a right total hip arthroplasty explantation with placement of an antibiotic spacer. Negative for postoperative purposes      Microbiology data  11/5/24 right hip (from OR): NGTD        I have personally reviewed the relevant laboratory, imaging, and microbiology data  ================================================================  Assessment  Ms. Kay is a 64o female with right hip PJI. PMH JOHNNIE, bipolar disorder, hx bilateral hip replacements.     She had initial right hip MISSY in 2015.  Per ortho note, in 2016 had right hip joint aspiration which showed 100,000 WBCs, but was lost to follow-up.  Based on her telling, she was never treated for what is presumably a chronic PJI. Regardless, she is now s/p hardware explantation " on 11/5 with spacer placement, cultures pending. Tentative plan is for 2nd stage implantation in ~3 months.  For now will treat as culture-negative PJI. I spoke to the micro lab to have them hold the culture longer to look for C acnes.     Impression  # Chronic Right hip PJI, unknown organism              - Initial implantation 2015              - Infection likely smoldering for long time              - s/p total explantation 11/5/24              - Tentative plan for 2nd stage reimplantation in ~3 months     ================================================================  Plan  OPAT (Outpatient Parenteral Antibiotic Therapy) Summary  - Drug: Vancomycin IV, dosing per pharmacy + Ceftriaxone 2g IV q24hr  - Duration of therapy: 6 weeks (through 12/16)  - Monitoring Labs: Weekly CBC w/differential, BUN/Cr, LFTs, CRP, vanc trough levels  - ID clinic follow up on 12/5       Dionisio Collado MD, MPH  [facility]   Office Telephone [phone].  Fax [phone]  [facility] paging

## 2024-11-08 NOTE — CONSULTS
"[facility]    [facility], [facility], [facility], [facility], [place]   PAIN Progress Note    Assessment/Plan:  Rosemary Kay is a 64 year old female who was admitted on 11/5/2024.  Pain team was asked to see the patient for post op pain, multiple opioid allergies/intolerances. Pain team signed off 11/6/24 and were re-consult today for post op pain.  Per Hospital Medicine Service this AM patient was tearful and with severe pain. At time of this visit, patient is calm, up in a chair and reports pain well controlled with current medications. She reports this AM was a \"bad time when that doctor saw me but I am better now\". Patient was admitted for planned procedure. History of RCC, breast cancer, JOHNNIE, CAITLYN, bipolar disorder not currently on medications, GERD, loosening prosthetic R hip.       Post op day: Day 3. R hip arthroplasty on 11/5/2024      Allergies/intolerance: Codeine (feels like skin is crawling but can take with Benadryl), Demerol (feels like skin is crawling but can take with Benadryl), morphine (feels like skin is crawling but can take with Benadryl, irritable), Percocet (itching, irritable), tramadol (itching but can take with Benadryl), Norco (itching, irritable), Toradol (feels like skin is crawling)    Pain well controlled. Plans for discharge. Pain team will sign off. Discussed with Orthopedics     PLAN:   1) Pain is consistent with post op pain     Multimodal Medication Therapy  Topical: Lidocaine ointment 4 times daily  NSAID'S: Celebrex 200 mg daily with lunch.   Steroids: None  Muscle Relaxants: tizanidine prn   Adjuvants: Acetaminophen every 6 hours, gabapentin 600 mg at bedtime (home medication), hydroxyzine as needed  Opioids: Oxycodone 5-10 mg q3h prn   IV Pain medication: none  Non-medication interventions: Ice, Rest, PT, OT, Distraction (TV, Music, Reading), Meditation, and Essential Oils  Constipation Prophylaxis: Bisacodyl Suppository, Senna-docusate, and Miralax   " "  -Opioid prescriber has been none recent  -[place]  pulled from system on 11/5/2024. This indicates ongoing fills for gabapentin.  Had tramadol filled in May 2024.  No other opioids other than tramadol filled in May.  Valium filled in January 2024  Discharge Recommendations - We recommend prescribing the following at the time of discharge: Per orthopedics - likely Oxycodone, Tizanidinem APAP, home Gabapentin, hydroxyzine, Celebrex, topicals     Subjective:  Describes pain as 4/10 and aching in the right hip. The patient denies nausea, vomiting, diarrhea, chest pain, shortness of breath, dizziness, fever, and chills. The patient reports passing flatus. Working with therapy. Tolerating diet. Hopes to go home today.     Principal Problem:  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)     Patient Active Problem List   Diagnosis    Bipolar I disorder, most recent episode (or current) mixed, moderate    Cannabis dependence, unspecified    Arthritis    GERD (gastroesophageal reflux disease)    Osteoarthritis    Hematoma    S/P revision of total hip    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)        Objective:    History   Drug Use Unknown          Tobacco Use      Smoking status: Former      Smokeless tobacco: Never      Tobacco comments: smoke pot; no cigarettes      Vital signs in last 24 hours:  BP (!) 142/64 (BP Location: Right arm)   Pulse 89   Temp 97.8  F (36.6  C) (Oral)   Resp 18   Ht 1.702 m (5' 7\")   Wt 112.9 kg (249 lb)   SpO2 96%   BMI 39.00 kg/m      Weight:     Vitals:    11/04/24 1700 11/05/24 0554   Weight: 112 kg (247 lb) 112.9 kg (249 lb)      Weight change:   Body mass index is 39 kg/m .    Intake/Output last 3 shifts:  No intake/output data recorded.  Intake/Output this shift:  No intake/output data recorded.    Review of Systems:   As per subjective, all others negative.    Physical Exam:  General Appearance:  Alert, cooperative, no distress, appears stated age, up in a chair   Head:  " Normocephalic, without obvious abnormality, atraumatic   Eyes:  PERRL, conjunctiva/corneas clear, EOM's intact   Nose: Nares normal, septum midline   Throat: Lips, mucosa, and tongue normal; teeth and gums normal   Neck: Supple, symmetrical, trachea midline   Back:   Symmetric, no curvature, ROM normal   Lungs:   Clear to auscultation bilaterally, respirations unlabored, room air   Chest Wall:  No tenderness or deformity   Extremities: Incision covered   Skin: Skin warm, dry    Neurologic: Alert and oriented X 3, Moves all 4 extremities   Psych: Affect is appropriate      Imaging: Reviewed I have personally reviewed pertinent notes, labs, tests, and radiologic imaging in patient's chart.  Labs: Reviewed I have personally reviewed pertinent notes, labs, tests, and radiologic imaging in patient's chart.  Notes: Reviewed I have personally reviewed pertinent notes, labs, tests, and radiologic imaging in patient's chart.    Total time spent 35 minutes with greater than 50% in consultation, education and coordination of care.   Also discussed with RN, Hospital Medicine Service, and Orthopedics.   Treatment plan includes: multimodal pain approach, Hospital Medicine Service for medical management, Orthopedics, PT, OT.   Patient educated regarding: multimodal pain approach and medications as listed above.   Elements of Medical Decision Making as described above. Acute or chronic illness or injury or surgery. High risk therapy including opioids, high risk drug therapy including oral and/or parenteral controlled substances.    Patient is understanding of the plan. All questions and concerns addressed to patient's satisfaction.     WILLIAMS Goel-KATERINA  Acute Care Pain Management Program  [facility]   Monday-Friday 8a-4p   Page via Epic or [employer]

## 2024-11-08 NOTE — PROGRESS NOTES
Care Management Discharge Note    Discharge Date: 11/08/2024       Discharge Disposition: Home, Home Infusion, Home Care    Discharge Services: None    Discharge DME: None    Discharge Transportation: family or friend will provide    Private pay costs discussed: Not applicable    Does the patient's insurance plan have a 3 day qualifying hospital stay waiver?  No    Patient/family educated on Medicare website which has current facility and service quality ratings:  Yes    Education Provided on the Discharge Plan: Yes (AVS per bedside RN)  Persons Notified of Discharge Plans: patient  Patient/Family in Agreement with the Plan: yes    Handoff Referral Completed: No, handoff not indicated or clinically appropriate    Additional Information:    Pt discharging to home with home infusion, family transport.    10:25 AM   [facility] - Teach Session - Erica SANDERS to complete teach session today 11/8 at 1300/1400pm with patient.  Update given to bedside RN.    2:11 PM  Per Erica SANDERS, teach session completed; no concerns reported.  See Infusion Services Note 11/8/24.  Pt to dose next Vanco dose at 2100pm at home.  [facility] will retreive discharge orders from [facility].    Pt okay to discharge from CM standpoint.  Update given to bedside RN.    Lelia Pedersen RN

## 2024-11-08 NOTE — PROGRESS NOTES
POD #3    Patient is doing well and feeling better.  Not pain free at this time.  Up in a chair at bedside.  Ambulatory and independent in self-care.    Vital stable.  Alert and oriented  Leg lengths are equal.  CMS intact.    No calf pain.    X-ray:  Antibiotic spacer satisfactory aligned sized and positioned.    Cultures:  No growth to date    Assessment:  Prosthetic joint infection right hip.  Cultures pending.  14-day hold placed on the culture.    Plan:  Patient requesting discharge to home.  Return to clinic in 10 days time.  Detailed written instructions provided.  Aspirin for DVT prophylaxis.  Antibiotic per PICC line per infectious disease.  All questions addressed to her satisfaction.    .

## 2024-11-09 PROCEDURE — A4223 INFUSION SUPPLIES W/O PUMP: HCPCS | Mod: GY

## 2024-11-09 PROCEDURE — S9500 HIT ANTIBIOTIC Q24H DIEM: HCPCS | Mod: GY

## 2024-11-10 LAB — BACTERIA ABSC ANAEROBE+AEROBE CULT: NO GROWTH

## 2024-11-10 PROCEDURE — A4223 INFUSION SUPPLIES W/O PUMP: HCPCS | Mod: GY

## 2024-11-10 PROCEDURE — S9500 HIT ANTIBIOTIC Q24H DIEM: HCPCS | Mod: GY

## 2024-11-11 ENCOUNTER — LAB REQUISITION (OUTPATIENT)
Dept: LAB | Facility: CLINIC | Age: 64
End: 2024-11-11
Payer: MEDICARE

## 2024-11-11 ENCOUNTER — HOME CARE VISIT (OUTPATIENT)
Dept: HOME HEALTH SERVICES | Facility: HOME HEALTH | Age: 64
End: 2024-11-11
Payer: MEDICARE

## 2024-11-11 ENCOUNTER — TELEPHONE (OUTPATIENT)
Dept: INFECTIOUS DISEASES | Facility: CLINIC | Age: 64
End: 2024-11-11

## 2024-11-11 VITALS
WEIGHT: 248 LBS | DIASTOLIC BLOOD PRESSURE: 71 MMHG | TEMPERATURE: 98.7 F | HEART RATE: 83 BPM | OXYGEN SATURATION: 94 % | SYSTOLIC BLOOD PRESSURE: 113 MMHG | RESPIRATION RATE: 18 BRPM | BODY MASS INDEX: 38.92 KG/M2 | HEIGHT: 67 IN

## 2024-11-11 DIAGNOSIS — T84.51XA INFECTION AND INFLAMMATORY REACTION DUE TO INTERNAL RIGHT HIP PROSTHESIS, INITIAL ENCOUNTER (H): ICD-10-CM

## 2024-11-11 DIAGNOSIS — T84.51XA INFECTION ASSOCIATED WITH INTERNAL RIGHT HIP PROSTHESIS, INITIAL ENCOUNTER (H): Primary | ICD-10-CM

## 2024-11-11 LAB
ALBUMIN SERPL BCG-MCNC: 3.4 G/DL (ref 3.5–5.2)
ALP SERPL-CCNC: 105 U/L (ref 40–150)
ALT SERPL W P-5'-P-CCNC: 20 U/L (ref 0–50)
AST SERPL W P-5'-P-CCNC: 26 U/L (ref 0–45)
BILIRUB DIRECT SERPL-MCNC: <0.2 MG/DL (ref 0–0.3)
BILIRUB SERPL-MCNC: 0.3 MG/DL
BUN SERPL-MCNC: 18.5 MG/DL (ref 8–23)
CREAT SERPL-MCNC: 0.66 MG/DL (ref 0.51–0.95)
CRP SERPL-MCNC: 57.78 MG/L
EGFRCR SERPLBLD CKD-EPI 2021: >90 ML/MIN/1.73M2
PROT SERPL-MCNC: 6.2 G/DL (ref 6.4–8.3)
VANCOMYCIN SERPL-MCNC: 12.1 UG/ML

## 2024-11-11 PROCEDURE — 82040 ASSAY OF SERUM ALBUMIN: CPT | Performed by: INTERNAL MEDICINE

## 2024-11-11 PROCEDURE — 82565 ASSAY OF CREATININE: CPT | Performed by: INTERNAL MEDICINE

## 2024-11-11 PROCEDURE — 84520 ASSAY OF UREA NITROGEN: CPT | Performed by: INTERNAL MEDICINE

## 2024-11-11 PROCEDURE — A4223 INFUSION SUPPLIES W/O PUMP: HCPCS | Mod: GY

## 2024-11-11 PROCEDURE — S9500 HIT ANTIBIOTIC Q24H DIEM: HCPCS | Mod: GY

## 2024-11-11 PROCEDURE — 80202 ASSAY OF VANCOMYCIN: CPT | Performed by: INTERNAL MEDICINE

## 2024-11-11 PROCEDURE — 86140 C-REACTIVE PROTEIN: CPT | Performed by: INTERNAL MEDICINE

## 2024-11-11 NOTE — PROGRESS NOTES
Nursing Visit Note:  Nurse visit today for initial visit, labs, dressing change and teach for Rosemary Kay.     present during visit today: Not Applicable.     Education Provided:     Patient Education focused on anti infective . Next visit plan Thursday for vanco trough and possible tpa.       Judy Morales RN 11/11/24   , Lab-Only Nursing Note    Labs obtained via PICC    [id number]    [facility]    Next Visit Plan:   Thursday for vanco trough and possible tpa    Judy Morales RN 11/11/2024  , and     Judy Morales RN 11/11/2024

## 2024-11-11 NOTE — TELEPHONE ENCOUNTER
[facility] calling- nurse unable to get Blood return- pt does not have coverage for TPA in home would need done Outpatient. Please place therapy plan

## 2024-11-12 PROCEDURE — A4223 INFUSION SUPPLIES W/O PUMP: HCPCS | Mod: GY

## 2024-11-12 PROCEDURE — S9500 HIT ANTIBIOTIC Q24H DIEM: HCPCS | Mod: GY

## 2024-11-13 ENCOUNTER — HOME INFUSION BILLING (OUTPATIENT)
Dept: HOME HEALTH SERVICES | Facility: HOME HEALTH | Age: 64
End: 2024-11-13
Payer: MEDICARE

## 2024-11-13 PROCEDURE — A4223 INFUSION SUPPLIES W/O PUMP: HCPCS | Mod: GY

## 2024-11-13 PROCEDURE — S9500 HIT ANTIBIOTIC Q24H DIEM: HCPCS | Mod: GY

## 2024-11-13 RX ORDER — METHYLPREDNISOLONE SODIUM SUCCINATE 40 MG/ML
40 INJECTION INTRAMUSCULAR; INTRAVENOUS
Start: 2024-11-13

## 2024-11-13 RX ORDER — ALBUTEROL SULFATE 0.83 MG/ML
2.5 SOLUTION RESPIRATORY (INHALATION)
OUTPATIENT
Start: 2024-11-13

## 2024-11-13 RX ORDER — ALBUTEROL SULFATE 90 UG/1
1-2 INHALANT RESPIRATORY (INHALATION)
Start: 2024-11-13

## 2024-11-13 RX ORDER — DIPHENHYDRAMINE HYDROCHLORIDE 50 MG/ML
50 INJECTION INTRAMUSCULAR; INTRAVENOUS
Start: 2024-11-13

## 2024-11-13 RX ORDER — EPINEPHRINE 1 MG/ML
0.3 INJECTION, SOLUTION, CONCENTRATE INTRAVENOUS EVERY 5 MIN PRN
OUTPATIENT
Start: 2024-11-13

## 2024-11-13 RX ORDER — DIPHENHYDRAMINE HYDROCHLORIDE 50 MG/ML
25 INJECTION INTRAMUSCULAR; INTRAVENOUS
Start: 2024-11-13

## 2024-11-13 RX ORDER — MEPERIDINE HYDROCHLORIDE 25 MG/ML
25 INJECTION INTRAMUSCULAR; INTRAVENOUS; SUBCUTANEOUS
OUTPATIENT
Start: 2024-11-13

## 2024-11-14 ENCOUNTER — HOME CARE VISIT (OUTPATIENT)
Dept: HOME HEALTH SERVICES | Facility: HOME HEALTH | Age: 64
End: 2024-11-14
Payer: MEDICARE

## 2024-11-14 ENCOUNTER — LAB REQUISITION (OUTPATIENT)
Dept: LAB | Facility: CLINIC | Age: 64
End: 2024-11-14
Payer: MEDICARE

## 2024-11-14 VITALS
TEMPERATURE: 98.6 F | SYSTOLIC BLOOD PRESSURE: 130 MMHG | OXYGEN SATURATION: 97 % | DIASTOLIC BLOOD PRESSURE: 78 MMHG | HEART RATE: 84 BPM | RESPIRATION RATE: 18 BRPM

## 2024-11-14 DIAGNOSIS — T84.51XA INFECTION AND INFLAMMATORY REACTION DUE TO INTERNAL RIGHT HIP PROSTHESIS, INITIAL ENCOUNTER (H): ICD-10-CM

## 2024-11-14 LAB
BACTERIA ABSC ANAEROBE+AEROBE CULT: NORMAL
CREAT SERPL-MCNC: 0.68 MG/DL (ref 0.51–0.95)
EGFRCR SERPLBLD CKD-EPI 2021: >90 ML/MIN/1.73M2
VANCOMYCIN SERPL-MCNC: 10.6 UG/ML

## 2024-11-14 PROCEDURE — 82565 ASSAY OF CREATININE: CPT | Performed by: RADIOLOGY

## 2024-11-14 PROCEDURE — 80202 ASSAY OF VANCOMYCIN: CPT | Performed by: RADIOLOGY

## 2024-11-14 PROCEDURE — S9500 HIT ANTIBIOTIC Q24H DIEM: HCPCS | Mod: GY

## 2024-11-14 PROCEDURE — A4223 INFUSION SUPPLIES W/O PUMP: HCPCS | Mod: GY

## 2024-11-14 NOTE — PROGRESS NOTES
Nursing Visit Note:  Nurse visit today for lab draw, GA for Rosemary Kay.     present during visit today: Not Applicable.    Lab-Only Nursing Note    Labs obtained via peripheral right hand     [facility] Tracking number: [id number]    Facility sent to: [facility]    Next Visit Plan:   11/18/24 @0830 for clc & labs    Judy Morales RN 11/14/2024      Judy Morales RN 11/14/2024

## 2024-11-15 ENCOUNTER — HOME INFUSION BILLING (OUTPATIENT)
Dept: HOME HEALTH SERVICES | Facility: HOME HEALTH | Age: 64
End: 2024-11-15
Payer: MEDICARE

## 2024-11-15 PROCEDURE — S9500 HIT ANTIBIOTIC Q24H DIEM: HCPCS | Mod: GY

## 2024-11-15 PROCEDURE — A4223 INFUSION SUPPLIES W/O PUMP: HCPCS | Mod: GY

## 2024-11-16 PROCEDURE — A4223 INFUSION SUPPLIES W/O PUMP: HCPCS | Mod: GY

## 2024-11-16 PROCEDURE — S9500 HIT ANTIBIOTIC Q24H DIEM: HCPCS | Mod: GY

## 2024-11-17 PROCEDURE — A4223 INFUSION SUPPLIES W/O PUMP: HCPCS | Mod: GY

## 2024-11-17 PROCEDURE — S9500 HIT ANTIBIOTIC Q24H DIEM: HCPCS | Mod: GY

## 2024-11-18 ENCOUNTER — HOME CARE VISIT (OUTPATIENT)
Dept: HOME HEALTH SERVICES | Facility: HOME HEALTH | Age: 64
End: 2024-11-18
Payer: MEDICARE

## 2024-11-18 ENCOUNTER — LAB REQUISITION (OUTPATIENT)
Dept: LAB | Facility: CLINIC | Age: 64
End: 2024-11-18
Payer: MEDICARE

## 2024-11-18 VITALS
SYSTOLIC BLOOD PRESSURE: 124 MMHG | DIASTOLIC BLOOD PRESSURE: 70 MMHG | TEMPERATURE: 97.3 F | RESPIRATION RATE: 22 BRPM | HEART RATE: 80 BPM | OXYGEN SATURATION: 95 %

## 2024-11-18 DIAGNOSIS — T84.51XA INFECTION AND INFLAMMATORY REACTION DUE TO INTERNAL RIGHT HIP PROSTHESIS, INITIAL ENCOUNTER (H): ICD-10-CM

## 2024-11-18 LAB
ALBUMIN SERPL BCG-MCNC: 3.9 G/DL (ref 3.5–5.2)
ALP SERPL-CCNC: 125 U/L (ref 40–150)
ALT SERPL W P-5'-P-CCNC: 16 U/L (ref 0–50)
AST SERPL W P-5'-P-CCNC: 25 U/L (ref 0–45)
BASOPHILS # BLD AUTO: 0.1 10E3/UL (ref 0–0.2)
BASOPHILS NFR BLD AUTO: 1 %
BILIRUB DIRECT SERPL-MCNC: <0.2 MG/DL (ref 0–0.3)
BILIRUB SERPL-MCNC: 0.2 MG/DL
BUN SERPL-MCNC: 14.1 MG/DL (ref 8–23)
CREAT SERPL-MCNC: 0.71 MG/DL (ref 0.51–0.95)
CRP SERPL-MCNC: 21.7 MG/L
EGFRCR SERPLBLD CKD-EPI 2021: >90 ML/MIN/1.73M2
EOSINOPHIL # BLD AUTO: 0.5 10E3/UL (ref 0–0.7)
EOSINOPHIL NFR BLD AUTO: 6 %
ERYTHROCYTE [DISTWIDTH] IN BLOOD BY AUTOMATED COUNT: 14.2 % (ref 10–15)
HCT VFR BLD AUTO: 29.9 % (ref 35–47)
HGB BLD-MCNC: 9.3 G/DL (ref 11.7–15.7)
IMM GRANULOCYTES # BLD: 0.1 10E3/UL
IMM GRANULOCYTES NFR BLD: 1 %
LYMPHOCYTES # BLD AUTO: 1.5 10E3/UL (ref 0.8–5.3)
LYMPHOCYTES NFR BLD AUTO: 17 %
MCH RBC QN AUTO: 29 PG (ref 26.5–33)
MCHC RBC AUTO-ENTMCNC: 31.1 G/DL (ref 31.5–36.5)
MCV RBC AUTO: 93 FL (ref 78–100)
MONOCYTES # BLD AUTO: 0.5 10E3/UL (ref 0–1.3)
MONOCYTES NFR BLD AUTO: 6 %
NEUTROPHILS # BLD AUTO: 5.8 10E3/UL (ref 1.6–8.3)
NEUTROPHILS NFR BLD AUTO: 70 %
NRBC # BLD AUTO: 0 10E3/UL
NRBC BLD AUTO-RTO: 0 /100
PLATELET # BLD AUTO: 278 10E3/UL (ref 150–450)
PROT SERPL-MCNC: 7 G/DL (ref 6.4–8.3)
RBC # BLD AUTO: 3.21 10E6/UL (ref 3.8–5.2)
VANCOMYCIN SERPL-MCNC: 15.9 UG/ML
WBC # BLD AUTO: 8.3 10E3/UL (ref 4–11)

## 2024-11-18 PROCEDURE — 99601 HOME NFS VISIT <2 HRS: CPT

## 2024-11-18 PROCEDURE — 84520 ASSAY OF UREA NITROGEN: CPT

## 2024-11-18 PROCEDURE — 85004 AUTOMATED DIFF WBC COUNT: CPT

## 2024-11-18 PROCEDURE — A4223 INFUSION SUPPLIES W/O PUMP: HCPCS | Mod: GY

## 2024-11-18 PROCEDURE — 82565 ASSAY OF CREATININE: CPT

## 2024-11-18 PROCEDURE — 84520 ASSAY OF UREA NITROGEN: CPT | Performed by: INTERNAL MEDICINE

## 2024-11-18 PROCEDURE — S9500 HIT ANTIBIOTIC Q24H DIEM: HCPCS | Mod: SH

## 2024-11-18 PROCEDURE — 85049 AUTOMATED PLATELET COUNT: CPT | Performed by: INTERNAL MEDICINE

## 2024-11-18 PROCEDURE — 82565 ASSAY OF CREATININE: CPT | Performed by: INTERNAL MEDICINE

## 2024-11-18 PROCEDURE — 84155 ASSAY OF PROTEIN SERUM: CPT | Performed by: INTERNAL MEDICINE

## 2024-11-18 PROCEDURE — 80202 ASSAY OF VANCOMYCIN: CPT

## 2024-11-18 PROCEDURE — S9501 HIT ANTIBIOTIC Q12H DIEM: HCPCS | Mod: GY

## 2024-11-18 PROCEDURE — 84155 ASSAY OF PROTEIN SERUM: CPT

## 2024-11-18 PROCEDURE — 82040 ASSAY OF SERUM ALBUMIN: CPT | Performed by: INTERNAL MEDICINE

## 2024-11-18 PROCEDURE — 86140 C-REACTIVE PROTEIN: CPT | Performed by: INTERNAL MEDICINE

## 2024-11-18 PROCEDURE — 80202 ASSAY OF VANCOMYCIN: CPT | Performed by: INTERNAL MEDICINE

## 2024-11-18 PROCEDURE — 82040 ASSAY OF SERUM ALBUMIN: CPT

## 2024-11-18 PROCEDURE — 85025 COMPLETE CBC W/AUTO DIFF WBC: CPT | Performed by: INTERNAL MEDICINE

## 2024-11-18 PROCEDURE — 86140 C-REACTIVE PROTEIN: CPT

## 2024-11-18 PROCEDURE — 85014 HEMATOCRIT: CPT

## 2024-11-18 NOTE — PROGRESS NOTES
Nursing Visit Note:  Nurse visit today for GA, CLC, and labs  for Rosemary Kay.     present during visit today: Not Applicable.    Lab-Only Nursing Note    Labs obtained via VPT    [facility] Tracking number: [id number]    Facility sent to: [facility]    Next Visit Plan:   Thursday (11/21/24)for GA and lab draw     Rosi Gongora RN 11/18/2024   and Patient s PICC continues to not have blood return.  Labs collected from left hand, and sent to [facility].  Patient will be removing dressing over left hip tomorrow.  VSS, all questions answered by MARILYN.       Rosi Gongora RN 11/18/2024

## 2024-11-19 ENCOUNTER — HOME INFUSION BILLING (OUTPATIENT)
Dept: HOME HEALTH SERVICES | Facility: HOME HEALTH | Age: 64
End: 2024-11-19
Payer: COMMERCIAL

## 2024-11-19 LAB — BACTERIA ABSC ANAEROBE+AEROBE CULT: NORMAL

## 2024-11-19 PROCEDURE — A4223 INFUSION SUPPLIES W/O PUMP: HCPCS | Mod: GY

## 2024-11-19 PROCEDURE — A9270 NON-COVERED ITEM OR SERVICE: HCPCS | Mod: GY

## 2024-11-19 PROCEDURE — S9501 HIT ANTIBIOTIC Q12H DIEM: HCPCS | Mod: GY

## 2024-11-19 PROCEDURE — S9500 HIT ANTIBIOTIC Q24H DIEM: HCPCS | Mod: SH

## 2024-11-20 PROCEDURE — A4223 INFUSION SUPPLIES W/O PUMP: HCPCS | Mod: GY

## 2024-11-20 PROCEDURE — S9501 HIT ANTIBIOTIC Q12H DIEM: HCPCS | Mod: GY

## 2024-11-20 PROCEDURE — S9500 HIT ANTIBIOTIC Q24H DIEM: HCPCS | Mod: SH

## 2024-11-21 ENCOUNTER — LAB REQUISITION (OUTPATIENT)
Dept: LAB | Facility: CLINIC | Age: 64
End: 2024-11-21
Payer: MEDICARE

## 2024-11-21 ENCOUNTER — HOME CARE VISIT (OUTPATIENT)
Dept: HOME HEALTH SERVICES | Facility: HOME HEALTH | Age: 64
End: 2024-11-21
Payer: MEDICARE

## 2024-11-21 VITALS
RESPIRATION RATE: 18 BRPM | HEART RATE: 81 BPM | DIASTOLIC BLOOD PRESSURE: 88 MMHG | SYSTOLIC BLOOD PRESSURE: 145 MMHG | TEMPERATURE: 98.2 F | OXYGEN SATURATION: 97 %

## 2024-11-21 DIAGNOSIS — T84.51XA INFECTION AND INFLAMMATORY REACTION DUE TO INTERNAL RIGHT HIP PROSTHESIS, INITIAL ENCOUNTER (H): ICD-10-CM

## 2024-11-21 LAB
CREAT SERPL-MCNC: 0.65 MG/DL (ref 0.51–0.95)
EGFRCR SERPLBLD CKD-EPI 2021: >90 ML/MIN/1.73M2
VANCOMYCIN SERPL-MCNC: 13.4 UG/ML

## 2024-11-21 PROCEDURE — A4221 SUPP NON-INSULIN INF CATH/WK: HCPCS | Mod: GY

## 2024-11-21 PROCEDURE — A4223 INFUSION SUPPLIES W/O PUMP: HCPCS | Mod: GY

## 2024-11-21 PROCEDURE — 82565 ASSAY OF CREATININE: CPT | Performed by: INTERNAL MEDICINE

## 2024-11-21 PROCEDURE — S9500 HIT ANTIBIOTIC Q24H DIEM: HCPCS | Mod: SH

## 2024-11-21 PROCEDURE — 80202 ASSAY OF VANCOMYCIN: CPT | Performed by: INTERNAL MEDICINE

## 2024-11-21 PROCEDURE — S9501 HIT ANTIBIOTIC Q12H DIEM: HCPCS | Mod: GY

## 2024-11-21 NOTE — PROGRESS NOTES
Nursing Visit Note:  Nurse visit today for lab draw and GA for Rosemary Kay.     present during visit today: Not Applicable.    Lab-Only Nursing Note    L[facility]    [id number]    [facility] to: [facility]    Next Visit Plan:   lab draw and dressing change 11/25/24. patient has no new complaints today. unable to get blood return from picc so blood draw from left hand VPT without difficulty. patient had her left hip dressing changed at her f/u appt on 11/19. patient c/o pain in left hip but controlled with pain medication. no other questions or concerns this visit.     Judy Morales RN 11/21/2024      Judy Morales RN 11/21/2024

## 2024-11-22 ENCOUNTER — HOME INFUSION BILLING (OUTPATIENT)
Dept: HOME HEALTH SERVICES | Facility: HOME HEALTH | Age: 64
End: 2024-11-22
Payer: COMMERCIAL

## 2024-11-22 PROCEDURE — A4223 INFUSION SUPPLIES W/O PUMP: HCPCS | Mod: GY

## 2024-11-22 PROCEDURE — S9500 HIT ANTIBIOTIC Q24H DIEM: HCPCS | Mod: SH

## 2024-11-22 PROCEDURE — S9501 HIT ANTIBIOTIC Q12H DIEM: HCPCS | Mod: GY

## 2024-11-23 PROCEDURE — S9501 HIT ANTIBIOTIC Q12H DIEM: HCPCS | Mod: GY

## 2024-11-23 PROCEDURE — S9500 HIT ANTIBIOTIC Q24H DIEM: HCPCS | Mod: SH

## 2024-11-23 PROCEDURE — A4223 INFUSION SUPPLIES W/O PUMP: HCPCS | Mod: GY

## 2024-11-24 PROCEDURE — A4223 INFUSION SUPPLIES W/O PUMP: HCPCS | Mod: GY

## 2024-11-24 PROCEDURE — S9501 HIT ANTIBIOTIC Q12H DIEM: HCPCS | Mod: GY

## 2024-11-24 PROCEDURE — S9500 HIT ANTIBIOTIC Q24H DIEM: HCPCS | Mod: SH

## 2024-11-25 ENCOUNTER — LAB REQUISITION (OUTPATIENT)
Dept: LAB | Facility: CLINIC | Age: 64
End: 2024-11-25
Payer: MEDICARE

## 2024-11-25 ENCOUNTER — HOME CARE VISIT (OUTPATIENT)
Dept: HOME HEALTH SERVICES | Facility: HOME HEALTH | Age: 64
End: 2024-11-25
Payer: MEDICARE

## 2024-11-25 VITALS
HEART RATE: 77 BPM | SYSTOLIC BLOOD PRESSURE: 138 MMHG | RESPIRATION RATE: 18 BRPM | OXYGEN SATURATION: 98 % | TEMPERATURE: 97.8 F | DIASTOLIC BLOOD PRESSURE: 84 MMHG

## 2024-11-25 DIAGNOSIS — T84.51XA INFECTION AND INFLAMMATORY REACTION DUE TO INTERNAL RIGHT HIP PROSTHESIS, INITIAL ENCOUNTER (H): ICD-10-CM

## 2024-11-25 LAB
ALBUMIN SERPL BCG-MCNC: 4.1 G/DL (ref 3.5–5.2)
ALP SERPL-CCNC: 144 U/L (ref 40–150)
ALT SERPL W P-5'-P-CCNC: 17 U/L (ref 0–50)
AST SERPL W P-5'-P-CCNC: 22 U/L (ref 0–45)
BASOPHILS # BLD AUTO: 0.1 10E3/UL (ref 0–0.2)
BASOPHILS NFR BLD AUTO: 1 %
BILIRUB DIRECT SERPL-MCNC: <0.2 MG/DL (ref 0–0.3)
BILIRUB SERPL-MCNC: 0.2 MG/DL
BUN SERPL-MCNC: 17.9 MG/DL (ref 8–23)
CREAT SERPL-MCNC: 0.63 MG/DL (ref 0.51–0.95)
CRP SERPL-MCNC: 12.49 MG/L
EGFRCR SERPLBLD CKD-EPI 2021: >90 ML/MIN/1.73M2
EOSINOPHIL # BLD AUTO: 0.5 10E3/UL (ref 0–0.7)
EOSINOPHIL NFR BLD AUTO: 7 %
ERYTHROCYTE [DISTWIDTH] IN BLOOD BY AUTOMATED COUNT: 13.5 % (ref 10–15)
HCT VFR BLD AUTO: 33 % (ref 35–47)
HGB BLD-MCNC: 10.4 G/DL (ref 11.7–15.7)
IMM GRANULOCYTES # BLD: 0 10E3/UL
IMM GRANULOCYTES NFR BLD: 0 %
LYMPHOCYTES # BLD AUTO: 1.8 10E3/UL (ref 0.8–5.3)
LYMPHOCYTES NFR BLD AUTO: 23 %
MCH RBC QN AUTO: 28.8 PG (ref 26.5–33)
MCHC RBC AUTO-ENTMCNC: 31.5 G/DL (ref 31.5–36.5)
MCV RBC AUTO: 91 FL (ref 78–100)
MONOCYTES # BLD AUTO: 0.5 10E3/UL (ref 0–1.3)
MONOCYTES NFR BLD AUTO: 7 %
NEUTROPHILS # BLD AUTO: 4.7 10E3/UL (ref 1.6–8.3)
NEUTROPHILS NFR BLD AUTO: 62 %
NRBC # BLD AUTO: 0 10E3/UL
NRBC BLD AUTO-RTO: 0 /100
PLATELET # BLD AUTO: 314 10E3/UL (ref 150–450)
PROT SERPL-MCNC: 7.3 G/DL (ref 6.4–8.3)
RBC # BLD AUTO: 3.61 10E6/UL (ref 3.8–5.2)
VANCOMYCIN SERPL-MCNC: 13.7 UG/ML
WBC # BLD AUTO: 7.6 10E3/UL (ref 4–11)

## 2024-11-25 PROCEDURE — 82040 ASSAY OF SERUM ALBUMIN: CPT | Performed by: RADIOLOGY

## 2024-11-25 PROCEDURE — 82248 BILIRUBIN DIRECT: CPT | Performed by: RADIOLOGY

## 2024-11-25 PROCEDURE — A4223 INFUSION SUPPLIES W/O PUMP: HCPCS | Mod: GY

## 2024-11-25 PROCEDURE — 84520 ASSAY OF UREA NITROGEN: CPT | Performed by: RADIOLOGY

## 2024-11-25 PROCEDURE — S9500 HIT ANTIBIOTIC Q24H DIEM: HCPCS | Mod: SH

## 2024-11-25 PROCEDURE — 85004 AUTOMATED DIFF WBC COUNT: CPT | Performed by: RADIOLOGY

## 2024-11-25 PROCEDURE — 82565 ASSAY OF CREATININE: CPT | Performed by: RADIOLOGY

## 2024-11-25 PROCEDURE — S9501 HIT ANTIBIOTIC Q12H DIEM: HCPCS | Mod: GY

## 2024-11-25 PROCEDURE — 82247 BILIRUBIN TOTAL: CPT | Performed by: RADIOLOGY

## 2024-11-25 PROCEDURE — 85018 HEMOGLOBIN: CPT | Performed by: RADIOLOGY

## 2024-11-25 PROCEDURE — 85041 AUTOMATED RBC COUNT: CPT | Performed by: RADIOLOGY

## 2024-11-25 PROCEDURE — 99601 HOME NFS VISIT <2 HRS: CPT

## 2024-11-25 PROCEDURE — 86140 C-REACTIVE PROTEIN: CPT | Performed by: RADIOLOGY

## 2024-11-25 PROCEDURE — 80202 ASSAY OF VANCOMYCIN: CPT | Performed by: RADIOLOGY

## 2024-11-25 NOTE — PROGRESS NOTES
Nursing Visit Note:  Nurse visit today for labs, CLC, general assessment  for Rosemary Kay.     present during visit today: Not Applicable.    Pt a/o, VSS, talkative and in good spirits. Reports right hip pain 4/10, Tylenol and Gabapentin have been helping with pain control. Pt showered prior to arrival. Author applied new gauze dressing to right hip incision which is healing well. PICC line not giving blood return. Meño labs peripherally and sent to [facility] via On[employer]. CLC completed without incident. Pt requesting next SNV on Friday 11/29 rather than Thurs 11/28 d/t holiday. Will continue pt s plan of care.       Isamar Bosch RN 11/25/2024

## 2024-11-25 NOTE — PROGRESS NOTES
Nursing Visit Note:  Nurse visit today for labs, CLC, general assessment  for Rosemary Kay.     present during visit today: Not Applicable.    Pt doing well, talkative and in good spirits. C/o pain 4/10 to right hip. Pt showered prior to arrival and author helped pt cover right hip incision with new gauze dressing. Hip incision is healing well. No blood return from      Isamar Bosch RN 11/25/2024

## 2024-11-26 ENCOUNTER — HOME INFUSION BILLING (OUTPATIENT)
Dept: HOME HEALTH SERVICES | Facility: HOME HEALTH | Age: 64
End: 2024-11-26
Payer: COMMERCIAL

## 2024-11-26 ENCOUNTER — HOME INFUSION BILLING (OUTPATIENT)
Dept: HOME HEALTH SERVICES | Facility: HOME HEALTH | Age: 64
End: 2024-11-26
Payer: MEDICARE

## 2024-11-26 PROCEDURE — A9270 NON-COVERED ITEM OR SERVICE: HCPCS | Mod: GY

## 2024-11-26 PROCEDURE — S9501 HIT ANTIBIOTIC Q12H DIEM: HCPCS | Mod: GY

## 2024-11-26 PROCEDURE — A4223 INFUSION SUPPLIES W/O PUMP: HCPCS | Mod: GY

## 2024-11-26 PROCEDURE — S9500 HIT ANTIBIOTIC Q24H DIEM: HCPCS | Mod: SH

## 2024-11-27 ENCOUNTER — HOME INFUSION (OUTPATIENT)
Dept: HOME HEALTH SERVICES | Facility: HOME HEALTH | Age: 64
End: 2024-11-27
Payer: MEDICARE

## 2024-11-27 ENCOUNTER — TELEPHONE (OUTPATIENT)
Dept: INFECTIOUS DISEASES | Facility: CLINIC | Age: 64
End: 2024-11-27
Payer: MEDICARE

## 2024-11-27 PROCEDURE — S9501 HIT ANTIBIOTIC Q12H DIEM: HCPCS | Mod: GY

## 2024-11-27 PROCEDURE — A4223 INFUSION SUPPLIES W/O PUMP: HCPCS | Mod: GY

## 2024-11-27 PROCEDURE — S9500 HIT ANTIBIOTIC Q24H DIEM: HCPCS | Mod: SH

## 2024-11-27 NOTE — TELEPHONE ENCOUNTER
[facility] Call Center    Phone Message    May a detailed message be left on voicemail: yes     Reason for Call: Other: Antibiotics   Patient would like a call back from the care team to find out if her antibiotics could make it hurt when she pees? Please call back to follow-up. Thank you.     Action Taken: Other: ID    Travel Screening: Not Applicable     Date of Service: 11/27/24

## 2024-11-27 NOTE — TELEPHONE ENCOUNTER
Spoke to pt and informed pt can try hydrocortisone cream or antifungal, if this does not help or pt wishes pt can be seen by pcp/UC to be assessed.     Pt verbalized understanding.

## 2024-11-27 NOTE — TELEPHONE ENCOUNTER
Pt on ceftriaxone and IV vancomycin.    Pt states it does not hurt to pee. Pt states it is the skin around the vagina and around her anus. It burns, its a little itchy. Pt has not tried anything on the area.

## 2024-11-28 PROCEDURE — S9501 HIT ANTIBIOTIC Q12H DIEM: HCPCS | Mod: GY

## 2024-11-28 PROCEDURE — A4221 SUPP NON-INSULIN INF CATH/WK: HCPCS | Mod: GY

## 2024-11-28 PROCEDURE — A4223 INFUSION SUPPLIES W/O PUMP: HCPCS | Mod: GY

## 2024-11-29 ENCOUNTER — LAB REQUISITION (OUTPATIENT)
Dept: LAB | Facility: CLINIC | Age: 64
End: 2024-11-29
Payer: MEDICARE

## 2024-11-29 ENCOUNTER — HOME INFUSION BILLING (OUTPATIENT)
Dept: HOME HEALTH SERVICES | Facility: HOME HEALTH | Age: 64
End: 2024-11-29
Payer: COMMERCIAL

## 2024-11-29 DIAGNOSIS — T84.51XA INFECTION AND INFLAMMATORY REACTION DUE TO INTERNAL RIGHT HIP PROSTHESIS, INITIAL ENCOUNTER (H): ICD-10-CM

## 2024-11-29 LAB
CREAT SERPL-MCNC: 0.69 MG/DL (ref 0.51–0.95)
EGFRCR SERPLBLD CKD-EPI 2021: >90 ML/MIN/1.73M2
VANCOMYCIN SERPL-MCNC: 12.7 UG/ML

## 2024-11-29 PROCEDURE — A4245 ALCOHOL WIPES PER BOX: HCPCS | Mod: GY

## 2024-11-29 PROCEDURE — A4223 INFUSION SUPPLIES W/O PUMP: HCPCS | Mod: GY

## 2024-11-29 PROCEDURE — S9501 HIT ANTIBIOTIC Q12H DIEM: HCPCS | Mod: GY

## 2024-11-29 PROCEDURE — 80202 ASSAY OF VANCOMYCIN: CPT | Performed by: INTERNAL MEDICINE

## 2024-11-29 PROCEDURE — 82565 ASSAY OF CREATININE: CPT | Performed by: INTERNAL MEDICINE

## 2024-11-30 PROCEDURE — A4223 INFUSION SUPPLIES W/O PUMP: HCPCS | Mod: GY

## 2024-11-30 PROCEDURE — S9501 HIT ANTIBIOTIC Q12H DIEM: HCPCS | Mod: GY

## 2024-12-01 PROCEDURE — S9501 HIT ANTIBIOTIC Q12H DIEM: HCPCS | Mod: GY

## 2024-12-01 PROCEDURE — A4223 INFUSION SUPPLIES W/O PUMP: HCPCS | Mod: GY

## 2024-12-02 ENCOUNTER — LAB REQUISITION (OUTPATIENT)
Dept: LAB | Facility: HOSPITAL | Age: 64
End: 2024-12-02
Payer: MEDICARE

## 2024-12-02 ENCOUNTER — HOME CARE VISIT (OUTPATIENT)
Dept: HOME HEALTH SERVICES | Facility: HOME HEALTH | Age: 64
End: 2024-12-02
Payer: MEDICARE

## 2024-12-02 VITALS
RESPIRATION RATE: 16 BRPM | TEMPERATURE: 97.5 F | SYSTOLIC BLOOD PRESSURE: 130 MMHG | OXYGEN SATURATION: 97 % | DIASTOLIC BLOOD PRESSURE: 74 MMHG | HEART RATE: 75 BPM

## 2024-12-02 DIAGNOSIS — T84.51XA INFECTION AND INFLAMMATORY REACTION DUE TO INTERNAL RIGHT HIP PROSTHESIS, INITIAL ENCOUNTER (H): ICD-10-CM

## 2024-12-02 LAB
ALBUMIN SERPL BCG-MCNC: 4.1 G/DL (ref 3.5–5.2)
ALP SERPL-CCNC: 141 U/L (ref 40–150)
ALT SERPL W P-5'-P-CCNC: 16 U/L (ref 0–50)
AST SERPL W P-5'-P-CCNC: 19 U/L (ref 0–45)
BASOPHILS # BLD AUTO: 0.1 10E3/UL (ref 0–0.2)
BASOPHILS NFR BLD AUTO: 1 %
BILIRUB DIRECT SERPL-MCNC: <0.2 MG/DL (ref 0–0.3)
BILIRUB SERPL-MCNC: 0.2 MG/DL
BUN SERPL-MCNC: 15.2 MG/DL (ref 8–23)
CREAT SERPL-MCNC: 0.65 MG/DL (ref 0.51–0.95)
CRP SERPL-MCNC: 11.4 MG/L
EGFRCR SERPLBLD CKD-EPI 2021: >90 ML/MIN/1.73M2
EOSINOPHIL # BLD AUTO: 0.5 10E3/UL (ref 0–0.7)
EOSINOPHIL NFR BLD AUTO: 7 %
ERYTHROCYTE [DISTWIDTH] IN BLOOD BY AUTOMATED COUNT: 13.6 % (ref 10–15)
HCT VFR BLD AUTO: 32.9 % (ref 35–47)
HGB BLD-MCNC: 10 G/DL (ref 11.7–15.7)
IMM GRANULOCYTES # BLD: 0 10E3/UL
IMM GRANULOCYTES NFR BLD: 0 %
LYMPHOCYTES # BLD AUTO: 1.4 10E3/UL (ref 0.8–5.3)
LYMPHOCYTES NFR BLD AUTO: 21 %
MCH RBC QN AUTO: 27.9 PG (ref 26.5–33)
MCHC RBC AUTO-ENTMCNC: 30.4 G/DL (ref 31.5–36.5)
MCV RBC AUTO: 92 FL (ref 78–100)
MONOCYTES # BLD AUTO: 0.4 10E3/UL (ref 0–1.3)
MONOCYTES NFR BLD AUTO: 6 %
NEUTROPHILS # BLD AUTO: 4.3 10E3/UL (ref 1.6–8.3)
NEUTROPHILS NFR BLD AUTO: 64 %
NRBC # BLD AUTO: 0 10E3/UL
NRBC BLD AUTO-RTO: 0 /100
PLATELET # BLD AUTO: 245 10E3/UL (ref 150–450)
PROT SERPL-MCNC: 7.1 G/DL (ref 6.4–8.3)
RBC # BLD AUTO: 3.59 10E6/UL (ref 3.8–5.2)
VANCOMYCIN SERPL-MCNC: 12.9 UG/ML
WBC # BLD AUTO: 6.6 10E3/UL (ref 4–11)

## 2024-12-02 PROCEDURE — S9501 HIT ANTIBIOTIC Q12H DIEM: HCPCS | Mod: GY

## 2024-12-02 PROCEDURE — 80202 ASSAY OF VANCOMYCIN: CPT | Performed by: INTERNAL MEDICINE

## 2024-12-02 PROCEDURE — 85014 HEMATOCRIT: CPT | Performed by: INTERNAL MEDICINE

## 2024-12-02 PROCEDURE — 84520 ASSAY OF UREA NITROGEN: CPT | Performed by: INTERNAL MEDICINE

## 2024-12-02 PROCEDURE — S9500 HIT ANTIBIOTIC Q24H DIEM: HCPCS | Mod: SH

## 2024-12-02 PROCEDURE — A4223 INFUSION SUPPLIES W/O PUMP: HCPCS | Mod: GY

## 2024-12-02 PROCEDURE — 85004 AUTOMATED DIFF WBC COUNT: CPT | Performed by: INTERNAL MEDICINE

## 2024-12-02 PROCEDURE — 86140 C-REACTIVE PROTEIN: CPT | Performed by: INTERNAL MEDICINE

## 2024-12-02 PROCEDURE — 82565 ASSAY OF CREATININE: CPT | Performed by: INTERNAL MEDICINE

## 2024-12-02 PROCEDURE — 80076 HEPATIC FUNCTION PANEL: CPT | Performed by: INTERNAL MEDICINE

## 2024-12-02 PROCEDURE — 99601 HOME NFS VISIT <2 HRS: CPT

## 2024-12-02 NOTE — PROGRESS NOTES
Nursing Visit Note:  Nurse visit today for labs, CLC, general assessment  for Rosemary Kay.     present during visit today: Not Applicable.    Pt doing well today, in good spirits. States right hip pain is 1/10 and is controlled with Tylenol as needed. Labs including Vanco trough drawn peripherally, single lumen PICC isn t giving blood return. Labs sent to [facility] via OnTime. CLC completed to right PICC without incident. Pt has f/u with ID this Thursday and is hopeful IV therapy can be completed.                                                                           Last dose of Vanco started on 12/1/24 at [id number]. Labs drawn at 0855. Will continue pt s plan of care.       Isamar Bosch RN 12/2/2024

## 2024-12-03 ENCOUNTER — HOME INFUSION BILLING (OUTPATIENT)
Dept: HOME HEALTH SERVICES | Facility: HOME HEALTH | Age: 64
End: 2024-12-03
Payer: MEDICARE

## 2024-12-03 ENCOUNTER — HOME INFUSION (OUTPATIENT)
Dept: HOME HEALTH SERVICES | Facility: HOME HEALTH | Age: 64
End: 2024-12-03
Payer: MEDICARE

## 2024-12-03 PROCEDURE — S9501 HIT ANTIBIOTIC Q12H DIEM: HCPCS | Mod: GY

## 2024-12-03 PROCEDURE — A4223 INFUSION SUPPLIES W/O PUMP: HCPCS | Mod: GY

## 2024-12-03 PROCEDURE — S9500 HIT ANTIBIOTIC Q24H DIEM: HCPCS | Mod: SH

## 2024-12-04 ENCOUNTER — HOME CARE VISIT (OUTPATIENT)
Dept: HOME HEALTH SERVICES | Facility: HOME HEALTH | Age: 64
End: 2024-12-04
Payer: MEDICARE

## 2024-12-04 ENCOUNTER — LAB REQUISITION (OUTPATIENT)
Dept: LAB | Facility: CLINIC | Age: 64
End: 2024-12-04
Payer: MEDICARE

## 2024-12-04 VITALS — RESPIRATION RATE: 16 BRPM

## 2024-12-04 DIAGNOSIS — T84.51XA INFECTION AND INFLAMMATORY REACTION DUE TO INTERNAL RIGHT HIP PROSTHESIS, INITIAL ENCOUNTER (H): ICD-10-CM

## 2024-12-04 LAB
CREAT SERPL-MCNC: 0.66 MG/DL (ref 0.51–0.95)
EGFRCR SERPLBLD CKD-EPI 2021: >90 ML/MIN/1.73M2
VANCOMYCIN SERPL-MCNC: 13.2 UG/ML

## 2024-12-04 PROCEDURE — A4221 SUPP NON-INSULIN INF CATH/WK: HCPCS | Mod: GY

## 2024-12-04 PROCEDURE — A4223 INFUSION SUPPLIES W/O PUMP: HCPCS | Mod: GY

## 2024-12-04 PROCEDURE — 99601 HOME NFS VISIT <2 HRS: CPT

## 2024-12-04 PROCEDURE — 82565 ASSAY OF CREATININE: CPT | Performed by: RADIOLOGY

## 2024-12-04 PROCEDURE — 80202 ASSAY OF VANCOMYCIN: CPT | Performed by: RADIOLOGY

## 2024-12-04 PROCEDURE — S9500 HIT ANTIBIOTIC Q24H DIEM: HCPCS | Mod: SH

## 2024-12-04 PROCEDURE — S9501 HIT ANTIBIOTIC Q12H DIEM: HCPCS | Mod: GY

## 2024-12-05 ENCOUNTER — OFFICE VISIT (OUTPATIENT)
Dept: INFECTIOUS DISEASES | Facility: CLINIC | Age: 64
End: 2024-12-05
Payer: MEDICARE

## 2024-12-05 VITALS
WEIGHT: 244 LBS | BODY MASS INDEX: 38.22 KG/M2 | DIASTOLIC BLOOD PRESSURE: 74 MMHG | TEMPERATURE: 98.9 F | HEART RATE: 76 BPM | SYSTOLIC BLOOD PRESSURE: 132 MMHG

## 2024-12-05 DIAGNOSIS — T84.51XA INFECTION ASSOCIATED WITH INTERNAL RIGHT HIP PROSTHESIS, INITIAL ENCOUNTER (H): Primary | ICD-10-CM

## 2024-12-05 PROCEDURE — S9500 HIT ANTIBIOTIC Q24H DIEM: HCPCS | Mod: GY

## 2024-12-05 PROCEDURE — A4223 INFUSION SUPPLIES W/O PUMP: HCPCS | Mod: GY

## 2024-12-05 NOTE — PROGRESS NOTES
INFECTIOUS DISEASES CLINIC NOTE    Rosemary Kay  YOB: 1960  MRN: [id number]  Date of visit: 12/05/24     ==========================================================    History of Present Illness:    Ms. Kay is a 64o female with right hip PJI. PMH JOHNNIE, bipolar disorder, hx bilateral hip replacements.     She had initial right hip MISSY in 2015.  Per ortho note, in 2016 had right hip joint aspiration which showed 100,000 WBCs, but was lost to follow-up.  Based on her telling, she was never treated for what is presumably a chronic PJI. Regardless, she underwent hardware explantation on 11/5 with spacer placement, culture negative. Tentative plan is for 2nd stage implantation in ~3 months.      She has completed 4.5/6 weeks of vanc + CTX and is doing well. Pain is well controlled, mobility is good, no systemic symptoms of infection, tolerating infusions well. CRP not normal but trending down (70 ---> 11).  Her only issue is some redness that has developed around her external vagina and perineum.      Past Medical History:  Past Medical History:   Diagnosis Date    Anemia     Arthritis     Gastroesophageal reflux disease     Liver disease     Obese     Sleep apnea         Past Surgical History:  Past Surgical History:   Procedure Laterality Date    ARTHROPLASTY REVISION HIP Right 11/05/2024    Procedure: RIGHT TOTAL HIP ARTHROPLASTY POSTERIOR REVISION BOTH COMPONENTS;  Surgeon: Rios Sanches MD;  Location: [facility] Main OR    BIOPSY BREAST Left 09/13/2016    Procedure: Evacuation of Hematoma Left Breast;  Surgeon: Halina Pagan MD;  Location: [facility] OR;  Service:     IR PICC CHECK RIGHT  11/06/2024    JOINT REPLACEMENT Left     KIDNEY SURGERY      mass excision on the left    LUMPECTOMY BREAST Left 09/07/2016    DR. PAGAN    LUMPECTOMY BREAST Left 09/07/2016    Dr. Pagan    PICC SINGLE LUMEN PLACEMENT Right 11/06/2024       Allergies:  Allergies   Allergen Reactions    Codeine Unknown      "Feels like skin is crawling but can take with benadryl.    Demerol [Meperidine] Unknown     Feels like skin is crawling but can take with benadryl.    Dilaudid [Hydromorphone] Unknown     Pt \"Turns into a monster and doesn't even know who she is\"    Iodinated Contrast Media [Iodinated Contrast Media] Hives    Latex Unknown     condoms    Morphine Unknown     Feels like skin is crawling and \"I feel like a monster\". I CANNOT have morphine and Hydromorphone. Ok to take oxycodone ONLY     Percocet [Oxycodone-Acetaminophen]      Itchy, irritable    Tramadol Itching     Itching but can take with benadryl    Vicodin [Hydrocodone-Acetaminophen] Unknown     Itchy, irritable        Family History:  Reviewed  Family History   Problem Relation Age of Onset    Heart Failure Mother     Hypertension Father     Cerebrovascular Disease Father     Breast Cancer Sister     Anesthesia Reaction No family hx of        Social History:  Reviewed and noncontributory, except for what is noted in the HPI.  Social History     Socioeconomic History    Marital status: Single     Spouse name: Not on file    Number of children: Not on file    Years of education: Not on file    Highest education level: Not on file   Occupational History    Not on file   Tobacco Use    Smoking status: Former    Smokeless tobacco: Never    Tobacco comments:     smoke pot; no cigarettes   Substance and Sexual Activity    Alcohol use: Yes     Comment: 3-4 per week    Drug use: Not Currently     Types: Marijuana    Sexual activity: Not on file   Other Topics Concern    Not on file   Social History Narrative    Not on file     Social Drivers of Health     Financial Resource Strain: Low Risk  (11/8/2024)    Financial Resource Strain     Within the past 12 months, have you or your family members you live with been unable to get utilities (heat, electricity) when it was really needed?: No   Food Insecurity: Low Risk  (11/8/2024)    Food Insecurity     Within the past 12 " months, did you worry that your food would run out before you got money to buy more?: No     Within the past 12 months, did the food you bought just not last and you didn t have money to get more?: No   Transportation Needs: Low Risk  (11/8/2024)    Transportation Needs     Within the past 12 months, has lack of transportation kept you from medical appointments, getting your medicines, non-medical meetings or appointments, work, or from getting things that you need?: No   Physical Activity: Not on file   Stress: Not on file   Social Connections: Not on file   Interpersonal Safety: Low Risk  (11/8/2024)    Interpersonal Safety     Do you feel physically and emotionally safe where you currently live?: Yes     Within the past 12 months, have you been hit, slapped, kicked or otherwise physically hurt by someone?: No     Within the past 12 months, have you been humiliated or emotionally abused in other ways by your partner or ex-partner?: No   Housing Stability: Low Risk  (11/8/2024)    Housing Stability     Do you have housing? : Yes     Are you worried about losing your housing?: No   Recent Concern: Housing Stability - High Risk (11/8/2024)    Housing Stability     Do you have housing? : No     Are you worried about losing your housing?: No        ================================================================  Objective  /74 (BP Location: Left arm, Patient Position: Sitting, Cuff Size: Adult Large)   Pulse 76   Temp 98.9  F (37.2  C) (Oral)   Wt 110.7 kg (244 lb)   BMI 38.22 kg/m      Physical Exam  General: alert, cooperative, no apparent distress  HEENT: atraumatic, normocephalic, no scleral icterus, moist mucus membranes, no cervical lymphadenopathy  Heart: Normal rate, regular rhythm, no murmurs  Lungs: CTAB, good inspiratory effort  Abdomen: soft, non-tender, non-distended. Perineum with mild redness/irritation   Extremities: no peripheral edema, no new rashes or lesions. Right hip not painful,  adequate ROM    Imaging  11/5 x-ray  IMPRESSION: Postoperative changes of a right total hip arthroplasty explantation with placement of an antibiotic spacer. Negative for postoperative purposes      Microbiology data  11/5/24 right hip (from OR): NG    ================================================================  Assessment  Ms. Kay is a 64o female with right hip PJI. PMH JOHNNIE, bipolar disorder, hx bilateral hip replacements.     She had initial right hip MISSY in 2015.  Per ortho note, in 2016 had right hip joint aspiration which showed 100,000 WBCs, but was lost to follow-up.  Based on her telling, she was never treated for what is presumably a chronic PJI. Regardless, she underwent hardware explantation on 11/5 with spacer placement, culture negative. Tentative plan is for 2nd stage implantation in ~3 months.      She has completed 4.5/6 weeks of vanc + CTX and is doing well. Pain is well controlled, mobility is good, no systemic symptoms of infection, tolerating infusions well. CRP not normal but trending down (70 ---> 11).  Her only issue is some redness that has developed around her external vagina and perineum.    Impression  # Chronic Right hip PJI, unknown organism              - Initial implantation 2015              - Infection likely smoldering for long time              - s/p total explantation 11/5/24              - Tentative plan for 2nd stage reimplantation in ~3 months  # Possible fungal infection of the perineum   - Counseled about antifungal cream/powder, she says she will take care of it      Plan:  - Continue vancomycin + ceftriaxone through 12/16  - No further ID follow-up needed  - Follow-up with ortho as scheduled      Dionisio Collado MD  [facility]   [phone]

## 2024-12-05 NOTE — PROGRESS NOTES
Lab-Only Nursing Note    Labs obtained via VPT    ONTIME Tracking number: [id number]    Facility sent to: [facility]    Saline administered (in mLs): 0    Next Visit Plan:   CLC and labs on Monday 12/9/24    Misael Honeycutt RN 12/4/2024

## 2024-12-06 ENCOUNTER — HOME INFUSION (OUTPATIENT)
Dept: HOME HEALTH SERVICES | Facility: HOME HEALTH | Age: 64
End: 2024-12-06
Payer: MEDICARE

## 2024-12-06 ENCOUNTER — HOME INFUSION BILLING (OUTPATIENT)
Dept: HOME HEALTH SERVICES | Facility: HOME HEALTH | Age: 64
End: 2024-12-06
Payer: MEDICARE

## 2024-12-06 ENCOUNTER — HOME INFUSION BILLING (OUTPATIENT)
Dept: HOME HEALTH SERVICES | Facility: HOME HEALTH | Age: 64
End: 2024-12-06
Payer: COMMERCIAL

## 2024-12-06 PROCEDURE — S9500 HIT ANTIBIOTIC Q24H DIEM: HCPCS | Mod: GY

## 2024-12-06 PROCEDURE — A4223 INFUSION SUPPLIES W/O PUMP: HCPCS | Mod: GY

## 2024-12-07 PROCEDURE — S9500 HIT ANTIBIOTIC Q24H DIEM: HCPCS | Mod: GY

## 2024-12-07 PROCEDURE — A4223 INFUSION SUPPLIES W/O PUMP: HCPCS | Mod: GY

## 2024-12-08 PROCEDURE — S9500 HIT ANTIBIOTIC Q24H DIEM: HCPCS | Mod: SH

## 2024-12-08 PROCEDURE — S9501 HIT ANTIBIOTIC Q12H DIEM: HCPCS | Mod: GY

## 2024-12-08 PROCEDURE — A4223 INFUSION SUPPLIES W/O PUMP: HCPCS | Mod: GY

## 2024-12-09 ENCOUNTER — HOME INFUSION BILLING (OUTPATIENT)
Dept: HOME HEALTH SERVICES | Facility: HOME HEALTH | Age: 64
End: 2024-12-09
Payer: COMMERCIAL

## 2024-12-09 ENCOUNTER — LAB REQUISITION (OUTPATIENT)
Dept: LAB | Facility: CLINIC | Age: 64
End: 2024-12-09
Payer: MEDICARE

## 2024-12-09 ENCOUNTER — HOME CARE VISIT (OUTPATIENT)
Dept: HOME HEALTH SERVICES | Facility: HOME HEALTH | Age: 64
End: 2024-12-09
Payer: MEDICARE

## 2024-12-09 VITALS
BODY MASS INDEX: 38.22 KG/M2 | TEMPERATURE: 98.4 F | DIASTOLIC BLOOD PRESSURE: 81 MMHG | SYSTOLIC BLOOD PRESSURE: 142 MMHG | OXYGEN SATURATION: 96 % | RESPIRATION RATE: 18 BRPM | HEART RATE: 66 BPM | WEIGHT: 244 LBS

## 2024-12-09 DIAGNOSIS — T84.51XA INFECTION AND INFLAMMATORY REACTION DUE TO INTERNAL RIGHT HIP PROSTHESIS, INITIAL ENCOUNTER (H): ICD-10-CM

## 2024-12-09 LAB
ALBUMIN SERPL BCG-MCNC: 4 G/DL (ref 3.5–5.2)
ALP SERPL-CCNC: 139 U/L (ref 40–150)
ALT SERPL W P-5'-P-CCNC: 21 U/L (ref 0–50)
AST SERPL W P-5'-P-CCNC: 29 U/L (ref 0–45)
BASOPHILS # BLD AUTO: 0.1 10E3/UL (ref 0–0.2)
BASOPHILS NFR BLD AUTO: 1 %
BILIRUB DIRECT SERPL-MCNC: <0.2 MG/DL (ref 0–0.3)
BILIRUB SERPL-MCNC: 0.2 MG/DL
BUN SERPL-MCNC: 13.3 MG/DL (ref 8–23)
CREAT SERPL-MCNC: 0.62 MG/DL (ref 0.51–0.95)
CRP SERPL-MCNC: 9.2 MG/L
EGFRCR SERPLBLD CKD-EPI 2021: >90 ML/MIN/1.73M2
EOSINOPHIL # BLD AUTO: 0.4 10E3/UL (ref 0–0.7)
EOSINOPHIL NFR BLD AUTO: 6 %
ERYTHROCYTE [DISTWIDTH] IN BLOOD BY AUTOMATED COUNT: 13.6 % (ref 10–15)
HCT VFR BLD AUTO: 32.4 % (ref 35–47)
HGB BLD-MCNC: 10 G/DL (ref 11.7–15.7)
IMM GRANULOCYTES # BLD: 0 10E3/UL
IMM GRANULOCYTES NFR BLD: 0 %
LYMPHOCYTES # BLD AUTO: 1.5 10E3/UL (ref 0.8–5.3)
LYMPHOCYTES NFR BLD AUTO: 22 %
MCH RBC QN AUTO: 28.2 PG (ref 26.5–33)
MCHC RBC AUTO-ENTMCNC: 30.9 G/DL (ref 31.5–36.5)
MCV RBC AUTO: 91 FL (ref 78–100)
MONOCYTES # BLD AUTO: 0.5 10E3/UL (ref 0–1.3)
MONOCYTES NFR BLD AUTO: 7 %
NEUTROPHILS # BLD AUTO: 4.3 10E3/UL (ref 1.6–8.3)
NEUTROPHILS NFR BLD AUTO: 64 %
NRBC # BLD AUTO: 0 10E3/UL
NRBC BLD AUTO-RTO: 0 /100
PLATELET # BLD AUTO: 193 10E3/UL (ref 150–450)
PROT SERPL-MCNC: 6.7 G/DL (ref 6.4–8.3)
RBC # BLD AUTO: 3.55 10E6/UL (ref 3.8–5.2)
VANCOMYCIN SERPL-MCNC: 11.9 UG/ML
WBC # BLD AUTO: 6.7 10E3/UL (ref 4–11)

## 2024-12-09 PROCEDURE — 85025 COMPLETE CBC W/AUTO DIFF WBC: CPT | Performed by: INTERNAL MEDICINE

## 2024-12-09 PROCEDURE — A4223 INFUSION SUPPLIES W/O PUMP: HCPCS | Mod: GY

## 2024-12-09 PROCEDURE — 82565 ASSAY OF CREATININE: CPT | Performed by: INTERNAL MEDICINE

## 2024-12-09 PROCEDURE — 80202 ASSAY OF VANCOMYCIN: CPT | Performed by: INTERNAL MEDICINE

## 2024-12-09 PROCEDURE — S9500 HIT ANTIBIOTIC Q24H DIEM: HCPCS | Mod: SH

## 2024-12-09 PROCEDURE — 99601 HOME NFS VISIT <2 HRS: CPT

## 2024-12-09 PROCEDURE — 84520 ASSAY OF UREA NITROGEN: CPT | Performed by: INTERNAL MEDICINE

## 2024-12-09 PROCEDURE — 80076 HEPATIC FUNCTION PANEL: CPT | Performed by: INTERNAL MEDICINE

## 2024-12-09 PROCEDURE — S9501 HIT ANTIBIOTIC Q12H DIEM: HCPCS | Mod: GY

## 2024-12-09 PROCEDURE — 86140 C-REACTIVE PROTEIN: CPT | Performed by: INTERNAL MEDICINE

## 2024-12-09 NOTE — PROGRESS NOTES
Nursing Visit Note:  Nurse visit today for labs and dressing change  for Rosemary Kay.     present during visit today: Not Applicable.    Lab-Only Nursing Note    Labs obtained via VPT    ONTIME Tracking number: [id number]    Facility sent to: [facility]    Saline administered (in mLs): 20    Next Visit Plan:   12/12/24 for labs. patient alert and oriented and answered the door and is walking well. patient c/o low back pain #5/10 relieved by medication and tens unit. VSS. labs obtained from [facility] VPT without difficulty. CLC changed without difficulty. this nurse requested flushes to be sent today as patient only has one left. no other questions or concerns this visit.     Judy Morales RN 12/9/2024      Judy Morales RN 12/9/2024

## 2024-12-10 ENCOUNTER — HOME INFUSION BILLING (OUTPATIENT)
Dept: HOME HEALTH SERVICES | Facility: HOME HEALTH | Age: 64
End: 2024-12-10
Payer: MEDICARE

## 2024-12-10 ENCOUNTER — HOME INFUSION BILLING (OUTPATIENT)
Dept: HOME HEALTH SERVICES | Facility: HOME HEALTH | Age: 64
End: 2024-12-10
Payer: COMMERCIAL

## 2024-12-10 PROCEDURE — S9501 HIT ANTIBIOTIC Q12H DIEM: HCPCS | Mod: GY

## 2024-12-10 PROCEDURE — A4223 INFUSION SUPPLIES W/O PUMP: HCPCS | Mod: GY

## 2024-12-10 PROCEDURE — S9500 HIT ANTIBIOTIC Q24H DIEM: HCPCS | Mod: SH

## 2024-12-10 PROCEDURE — A9270 NON-COVERED ITEM OR SERVICE: HCPCS | Mod: GY

## 2024-12-11 PROCEDURE — A4223 INFUSION SUPPLIES W/O PUMP: HCPCS | Mod: GY

## 2024-12-11 PROCEDURE — A4221 SUPP NON-INSULIN INF CATH/WK: HCPCS | Mod: GY

## 2024-12-11 PROCEDURE — S9500 HIT ANTIBIOTIC Q24H DIEM: HCPCS | Mod: SH

## 2024-12-11 PROCEDURE — S9501 HIT ANTIBIOTIC Q12H DIEM: HCPCS | Mod: GY

## 2024-12-12 ENCOUNTER — HOME CARE VISIT (OUTPATIENT)
Dept: HOME HEALTH SERVICES | Facility: HOME HEALTH | Age: 64
End: 2024-12-12
Payer: MEDICARE

## 2024-12-12 ENCOUNTER — LAB REQUISITION (OUTPATIENT)
Dept: LAB | Facility: CLINIC | Age: 64
End: 2024-12-12
Payer: MEDICARE

## 2024-12-12 VITALS
DIASTOLIC BLOOD PRESSURE: 76 MMHG | SYSTOLIC BLOOD PRESSURE: 156 MMHG | HEART RATE: 73 BPM | RESPIRATION RATE: 18 BRPM | OXYGEN SATURATION: 97 % | TEMPERATURE: 97.1 F

## 2024-12-12 DIAGNOSIS — T84.51XA INFECTION AND INFLAMMATORY REACTION DUE TO INTERNAL RIGHT HIP PROSTHESIS, INITIAL ENCOUNTER (H): ICD-10-CM

## 2024-12-12 LAB
CREAT SERPL-MCNC: 0.65 MG/DL (ref 0.51–0.95)
EGFRCR SERPLBLD CKD-EPI 2021: >90 ML/MIN/1.73M2
VANCOMYCIN SERPL-MCNC: 12.9 UG/ML

## 2024-12-12 PROCEDURE — S9501 HIT ANTIBIOTIC Q12H DIEM: HCPCS | Mod: GY

## 2024-12-12 PROCEDURE — A4223 INFUSION SUPPLIES W/O PUMP: HCPCS | Mod: GY

## 2024-12-12 PROCEDURE — S9500 HIT ANTIBIOTIC Q24H DIEM: HCPCS | Mod: SH

## 2024-12-12 PROCEDURE — 80202 ASSAY OF VANCOMYCIN: CPT | Performed by: INTERNAL MEDICINE

## 2024-12-12 PROCEDURE — 82565 ASSAY OF CREATININE: CPT | Performed by: INTERNAL MEDICINE

## 2024-12-12 NOTE — PROGRESS NOTES
Lab-Only Nursing Note    Labs obtained via VPT    ONTIME Tracking number: [id number]    Facility sent to: [facility]    Saline administered (in mLs): 0    Next Visit Plan:   CLC/L 12/16/24  Pt denies pain. States she is intentional about movement and R hip when getting up to ambulate. Labs drawn via Peripheral draw.    Wandy Butler RN 12/12/2024     Vaccine status unknown

## 2024-12-13 ENCOUNTER — HOME INFUSION (OUTPATIENT)
Dept: HOME HEALTH SERVICES | Facility: HOME HEALTH | Age: 64
End: 2024-12-13
Payer: MEDICARE

## 2024-12-13 ENCOUNTER — HOME INFUSION BILLING (OUTPATIENT)
Dept: HOME HEALTH SERVICES | Facility: HOME HEALTH | Age: 64
End: 2024-12-13
Payer: COMMERCIAL

## 2024-12-13 DIAGNOSIS — T84.51XA INFECTION ASSOCIATED WITH INTERNAL RIGHT HIP PROSTHESIS, INITIAL ENCOUNTER (H): Primary | ICD-10-CM

## 2024-12-13 PROCEDURE — S9501 HIT ANTIBIOTIC Q12H DIEM: HCPCS | Mod: GY

## 2024-12-13 PROCEDURE — A4223 INFUSION SUPPLIES W/O PUMP: HCPCS | Mod: GY

## 2024-12-13 PROCEDURE — S9500 HIT ANTIBIOTIC Q24H DIEM: HCPCS | Mod: SH

## 2024-12-14 PROCEDURE — S9500 HIT ANTIBIOTIC Q24H DIEM: HCPCS | Mod: SH

## 2024-12-14 PROCEDURE — A4223 INFUSION SUPPLIES W/O PUMP: HCPCS | Mod: GY

## 2024-12-14 PROCEDURE — S9501 HIT ANTIBIOTIC Q12H DIEM: HCPCS | Mod: GY

## 2024-12-15 PROCEDURE — S9500 HIT ANTIBIOTIC Q24H DIEM: HCPCS | Mod: SH

## 2024-12-15 PROCEDURE — A4223 INFUSION SUPPLIES W/O PUMP: HCPCS | Mod: GY

## 2024-12-15 PROCEDURE — S9501 HIT ANTIBIOTIC Q12H DIEM: HCPCS | Mod: GY

## 2024-12-16 PROCEDURE — S9500 HIT ANTIBIOTIC Q24H DIEM: HCPCS | Mod: SH

## 2024-12-16 PROCEDURE — A4223 INFUSION SUPPLIES W/O PUMP: HCPCS | Mod: GY

## 2024-12-16 PROCEDURE — S9501 HIT ANTIBIOTIC Q12H DIEM: HCPCS | Mod: GY

## 2024-12-17 PROCEDURE — S9500 HIT ANTIBIOTIC Q24H DIEM: HCPCS | Mod: SH

## 2024-12-17 PROCEDURE — A4223 INFUSION SUPPLIES W/O PUMP: HCPCS | Mod: GY

## 2024-12-17 PROCEDURE — S9501 HIT ANTIBIOTIC Q12H DIEM: HCPCS | Mod: GY

## 2024-12-18 ENCOUNTER — HOME CARE VISIT (OUTPATIENT)
Dept: HOME HEALTH SERVICES | Facility: HOME HEALTH | Age: 64
End: 2024-12-18
Payer: MEDICARE

## 2024-12-18 PROCEDURE — 99601 HOME NFS VISIT <2 HRS: CPT

## 2024-12-18 PROCEDURE — A4223 INFUSION SUPPLIES W/O PUMP: HCPCS

## 2024-12-19 PROCEDURE — A4223 INFUSION SUPPLIES W/O PUMP: HCPCS

## 2024-12-19 NOTE — PROGRESS NOTES
Nursing Visit Note:  Nurse visit today for picc pull for Rosemary MOSQUEDA Rezasylvain.     present during visit today: Not Applicable.    alert and oriented and states feeling overall well. picc pulled and measured 43 cm and tip intact. covered site with bacitracin dressing and instructed patient to keep in place for 24 hours      Kaitlin Knutson 12/18/2024

## 2024-12-20 PROCEDURE — A4223 INFUSION SUPPLIES W/O PUMP: HCPCS

## 2024-12-21 PROCEDURE — A4223 INFUSION SUPPLIES W/O PUMP: HCPCS

## 2025-02-12 ENCOUNTER — LAB REQUISITION (OUTPATIENT)
Dept: LAB | Facility: CLINIC | Age: 65
End: 2025-02-12

## 2025-02-12 ENCOUNTER — LAB REQUISITION (OUTPATIENT)
Dept: LAB | Facility: HOSPITAL | Age: 65
End: 2025-02-12
Payer: MEDICARE

## 2025-02-12 DIAGNOSIS — Z01.818 ENCOUNTER FOR OTHER PREPROCEDURAL EXAMINATION: ICD-10-CM

## 2025-02-12 DIAGNOSIS — M25.551 PAIN IN RIGHT HIP: ICD-10-CM

## 2025-02-12 LAB
ANION GAP SERPL CALCULATED.3IONS-SCNC: 14 MMOL/L (ref 7–15)
BUN SERPL-MCNC: 12.2 MG/DL (ref 8–23)
CALCIUM SERPL-MCNC: 9.7 MG/DL (ref 8.8–10.4)
CHLORIDE SERPL-SCNC: 104 MMOL/L (ref 98–107)
CREAT SERPL-MCNC: 0.7 MG/DL (ref 0.51–0.95)
CRP SERPL-MCNC: 7.1 MG/L
D DIMER PPP FEU-MCNC: 2.77 UG/ML FEU (ref 0–0.5)
EGFRCR SERPLBLD CKD-EPI 2021: >90 ML/MIN/1.73M2
ERYTHROCYTE [DISTWIDTH] IN BLOOD BY AUTOMATED COUNT: 15 % (ref 10–15)
ERYTHROCYTE [SEDIMENTATION RATE] IN BLOOD BY WESTERGREN METHOD: 39 MM/HR (ref 0–30)
GLUCOSE SERPL-MCNC: 107 MG/DL (ref 70–99)
HCO3 SERPL-SCNC: 24 MMOL/L (ref 22–29)
HCT VFR BLD AUTO: 38.5 % (ref 35–47)
HGB BLD-MCNC: 12 G/DL (ref 11.7–15.7)
MCH RBC QN AUTO: 26.9 PG (ref 26.5–33)
MCHC RBC AUTO-ENTMCNC: 31.2 G/DL (ref 31.5–36.5)
MCV RBC AUTO: 86 FL (ref 78–100)
PLATELET # BLD AUTO: 217 10E3/UL (ref 150–450)
POTASSIUM SERPL-SCNC: 4.1 MMOL/L (ref 3.4–5.3)
RBC # BLD AUTO: 4.46 10E6/UL (ref 3.8–5.2)
SODIUM SERPL-SCNC: 142 MMOL/L (ref 135–145)
WBC # BLD AUTO: 7.2 10E3/UL (ref 4–11)

## 2025-02-12 PROCEDURE — 86140 C-REACTIVE PROTEIN: CPT | Mod: ORL | Performed by: ORTHOPAEDIC SURGERY

## 2025-02-12 PROCEDURE — 80048 BASIC METABOLIC PNL TOTAL CA: CPT | Performed by: PHYSICIAN ASSISTANT

## 2025-02-12 PROCEDURE — 85018 HEMOGLOBIN: CPT | Performed by: PHYSICIAN ASSISTANT

## 2025-02-12 PROCEDURE — 36415 COLL VENOUS BLD VENIPUNCTURE: CPT | Mod: ORL | Performed by: ORTHOPAEDIC SURGERY

## 2025-02-12 PROCEDURE — 85652 RBC SED RATE AUTOMATED: CPT | Mod: ORL | Performed by: ORTHOPAEDIC SURGERY

## 2025-02-12 PROCEDURE — 85379 FIBRIN DEGRADATION QUANT: CPT | Mod: ORL | Performed by: ORTHOPAEDIC SURGERY

## 2025-02-12 PROCEDURE — 82374 ASSAY BLOOD CARBON DIOXIDE: CPT | Performed by: PHYSICIAN ASSISTANT

## 2025-02-17 NOTE — PROGRESS NOTES
02/17/25 0640   Discharge Planning   Patient/Family Anticipates Transition to home   Concerns to be Addressed all concerns addressed in this encounter   Living Arrangements   People in Home alone   Type of Residence Private Residence   Is your private residence a single family home or apartment? Single family home   Stair Railings, Within Home, Primary railings safe and in good condition   Bathroom Shower/Tub Tub/Shower unit   Support System   Support Systems Children  (Daughter)   Do you have someone available to stay with you one or two nights once you are home? Yes

## 2025-04-28 ENCOUNTER — LAB REQUISITION (OUTPATIENT)
Dept: LAB | Facility: CLINIC | Age: 65
End: 2025-04-28

## 2025-04-28 DIAGNOSIS — Z01.818 ENCOUNTER FOR OTHER PREPROCEDURAL EXAMINATION: ICD-10-CM

## 2025-04-28 LAB
ANION GAP SERPL CALCULATED.3IONS-SCNC: 13 MMOL/L (ref 7–15)
BUN SERPL-MCNC: 11.9 MG/DL (ref 8–23)
CALCIUM SERPL-MCNC: 9.4 MG/DL (ref 8.8–10.4)
CHLORIDE SERPL-SCNC: 104 MMOL/L (ref 98–107)
CREAT SERPL-MCNC: 0.73 MG/DL (ref 0.51–0.95)
EGFRCR SERPLBLD CKD-EPI 2021: >90 ML/MIN/1.73M2
ERYTHROCYTE [DISTWIDTH] IN BLOOD BY AUTOMATED COUNT: 16.2 % (ref 10–15)
GLUCOSE SERPL-MCNC: 106 MG/DL (ref 70–99)
HCO3 SERPL-SCNC: 24 MMOL/L (ref 22–29)
HCT VFR BLD AUTO: 39.4 % (ref 35–47)
HGB BLD-MCNC: 12.1 G/DL (ref 11.7–15.7)
MCH RBC QN AUTO: 27.6 PG (ref 26.5–33)
MCHC RBC AUTO-ENTMCNC: 30.7 G/DL (ref 31.5–36.5)
MCV RBC AUTO: 90 FL (ref 78–100)
PLATELET # BLD AUTO: 181 10E3/UL (ref 150–450)
POTASSIUM SERPL-SCNC: 4.2 MMOL/L (ref 3.4–5.3)
RBC # BLD AUTO: 4.39 10E6/UL (ref 3.8–5.2)
SODIUM SERPL-SCNC: 141 MMOL/L (ref 135–145)
WBC # BLD AUTO: 7.4 10E3/UL (ref 4–11)

## 2025-04-28 PROCEDURE — 80048 BASIC METABOLIC PNL TOTAL CA: CPT | Performed by: PHYSICIAN ASSISTANT

## 2025-04-28 PROCEDURE — 85018 HEMOGLOBIN: CPT | Performed by: PHYSICIAN ASSISTANT

## 2025-05-07 ENCOUNTER — ANESTHESIA EVENT (OUTPATIENT)
Dept: SURGERY | Facility: CLINIC | Age: 65
End: 2025-05-07
Payer: COMMERCIAL

## 2025-05-08 ENCOUNTER — APPOINTMENT (OUTPATIENT)
Dept: RADIOLOGY | Facility: CLINIC | Age: 65
DRG: 467 | End: 2025-05-08
Payer: COMMERCIAL

## 2025-05-08 ENCOUNTER — APPOINTMENT (OUTPATIENT)
Dept: PHYSICAL THERAPY | Facility: CLINIC | Age: 65
DRG: 467 | End: 2025-05-08
Attending: ORTHOPAEDIC SURGERY
Payer: COMMERCIAL

## 2025-05-08 ENCOUNTER — APPOINTMENT (OUTPATIENT)
Dept: RADIOLOGY | Facility: CLINIC | Age: 65
DRG: 467 | End: 2025-05-08
Attending: ORTHOPAEDIC SURGERY
Payer: COMMERCIAL

## 2025-05-08 ENCOUNTER — HOSPITAL ENCOUNTER (INPATIENT)
Facility: CLINIC | Age: 65
End: 2025-05-08
Attending: ORTHOPAEDIC SURGERY | Admitting: ORTHOPAEDIC SURGERY
Payer: COMMERCIAL

## 2025-05-08 ENCOUNTER — ANESTHESIA (OUTPATIENT)
Dept: SURGERY | Facility: CLINIC | Age: 65
End: 2025-05-08
Payer: COMMERCIAL

## 2025-05-08 VITALS
HEART RATE: 63 BPM | HEIGHT: 67 IN | TEMPERATURE: 97.9 F | RESPIRATION RATE: 12 BRPM | BODY MASS INDEX: 36.74 KG/M2 | WEIGHT: 234.1 LBS | DIASTOLIC BLOOD PRESSURE: 77 MMHG | SYSTOLIC BLOOD PRESSURE: 174 MMHG | OXYGEN SATURATION: 97 %

## 2025-05-08 DIAGNOSIS — Z96.641 S/P REVISION OF RIGHT TOTAL HIP: Primary | ICD-10-CM

## 2025-05-08 PROBLEM — Z96.649 S/P REVISION OF TOTAL HIP: Status: ACTIVE | Noted: 2024-11-05

## 2025-05-08 LAB
ABO + RH BLD: NORMAL
APPEARANCE FLD: ABNORMAL
BACTERIA SPEC CULT: NORMAL
BLD GP AB SCN SERPL QL: NEGATIVE
COLOR FLD: YELLOW
CREAT SERPL-MCNC: 0.75 MG/DL (ref 0.51–0.95)
CRP SERPL-MCNC: 7.23 MG/L
EGFRCR SERPLBLD CKD-EPI 2021: 88 ML/MIN/1.73M2
ERYTHROCYTE [DISTWIDTH] IN BLOOD BY AUTOMATED COUNT: 15.9 % (ref 10–15)
ERYTHROCYTE [SEDIMENTATION RATE] IN BLOOD BY WESTERGREN METHOD: 33 MM/HR (ref 0–30)
GLUCOSE BLDC GLUCOMTR-MCNC: 90 MG/DL (ref 70–99)
GRAM STAIN RESULT: NORMAL
GRAM STAIN RESULT: NORMAL
HCT VFR BLD AUTO: 36.2 % (ref 35–47)
HGB BLD-MCNC: 12 G/DL (ref 11.7–15.7)
INR PPP: 0.94 (ref 0.85–1.15)
LYMPHOCYTES NFR FLD MANUAL: 47 %
MCH RBC QN AUTO: 28.6 PG (ref 26.5–33)
MCHC RBC AUTO-ENTMCNC: 33.1 G/DL (ref 31.5–36.5)
MCV RBC AUTO: 86 FL (ref 78–100)
MONOS+MACROS NFR FLD MANUAL: 28 %
NEUTS BAND NFR FLD MANUAL: 25 %
PLATELET # BLD AUTO: 186 10E3/UL (ref 150–450)
POTASSIUM SERPL-SCNC: 3.8 MMOL/L (ref 3.4–5.3)
PROTHROMBIN TIME: 12.8 SECONDS (ref 11.8–14.8)
RBC # BLD AUTO: 4.2 10E6/UL (ref 3.8–5.2)
SPECIMEN EXP DATE BLD: NORMAL
SPECIMEN SOURCE FLD: ABNORMAL
WBC # BLD AUTO: 7.9 10E3/UL (ref 4–11)
WBC # FLD AUTO: 285 /UL

## 2025-05-08 PROCEDURE — P9045 ALBUMIN (HUMAN), 5%, 250 ML: HCPCS

## 2025-05-08 PROCEDURE — 250N000011 HC RX IP 250 OP 636

## 2025-05-08 PROCEDURE — 258N000003 HC RX IP 258 OP 636: Performed by: ANESTHESIOLOGY

## 2025-05-08 PROCEDURE — 250N000009 HC RX 250

## 2025-05-08 PROCEDURE — 258N000003 HC RX IP 258 OP 636

## 2025-05-08 PROCEDURE — 250N000013 HC RX MED GY IP 250 OP 250 PS 637

## 2025-05-08 PROCEDURE — 97161 PT EVAL LOW COMPLEX 20 MIN: CPT | Mod: GP

## 2025-05-08 PROCEDURE — 999N000063 XR HIP PORT RIGHT 1 VIEW

## 2025-05-08 PROCEDURE — 250N000011 HC RX IP 250 OP 636: Mod: JZ | Performed by: ANESTHESIOLOGY

## 2025-05-08 PROCEDURE — 0SRR03A REPLACEMENT OF RIGHT HIP JOINT, FEMORAL SURFACE WITH CERAMIC SYNTHETIC SUBSTITUTE, UNCEMENTED, OPEN APPROACH: ICD-10-PCS | Performed by: ORTHOPAEDIC SURGERY

## 2025-05-08 PROCEDURE — 99223 1ST HOSP IP/OBS HIGH 75: CPT | Performed by: STUDENT IN AN ORGANIZED HEALTH CARE EDUCATION/TRAINING PROGRAM

## 2025-05-08 PROCEDURE — 272N000001 HC OR GENERAL SUPPLY STERILE: Performed by: ORTHOPAEDIC SURGERY

## 2025-05-08 PROCEDURE — 120N000001 HC R&B MED SURG/OB

## 2025-05-08 PROCEDURE — 84132 ASSAY OF SERUM POTASSIUM: CPT

## 2025-05-08 PROCEDURE — 85610 PROTHROMBIN TIME: CPT

## 2025-05-08 PROCEDURE — 36415 COLL VENOUS BLD VENIPUNCTURE: CPT

## 2025-05-08 PROCEDURE — 86140 C-REACTIVE PROTEIN: CPT

## 2025-05-08 PROCEDURE — 0SP90EZ REMOVAL OF ARTICULATING SPACER FROM RIGHT HIP JOINT, OPEN APPROACH: ICD-10-PCS | Performed by: ORTHOPAEDIC SURGERY

## 2025-05-08 PROCEDURE — 710N000010 HC RECOVERY PHASE 1, LEVEL 2, PER MIN: Performed by: ORTHOPAEDIC SURGERY

## 2025-05-08 PROCEDURE — 250N000009 HC RX 250: Performed by: ORTHOPAEDIC SURGERY

## 2025-05-08 PROCEDURE — 250N000011 HC RX IP 250 OP 636: Performed by: ANESTHESIOLOGY

## 2025-05-08 PROCEDURE — 250N000011 HC RX IP 250 OP 636: Mod: JZ | Performed by: ORTHOPAEDIC SURGERY

## 2025-05-08 PROCEDURE — 85027 COMPLETE CBC AUTOMATED: CPT

## 2025-05-08 PROCEDURE — 82565 ASSAY OF CREATININE: CPT

## 2025-05-08 PROCEDURE — 370N000017 HC ANESTHESIA TECHNICAL FEE, PER MIN: Performed by: ORTHOPAEDIC SURGERY

## 2025-05-08 PROCEDURE — 86901 BLOOD TYPING SEROLOGIC RH(D): CPT

## 2025-05-08 PROCEDURE — 999N000141 HC STATISTIC PRE-PROCEDURE NURSING ASSESSMENT: Performed by: ORTHOPAEDIC SURGERY

## 2025-05-08 PROCEDURE — 250N000013 HC RX MED GY IP 250 OP 250 PS 637: Performed by: STUDENT IN AN ORGANIZED HEALTH CARE EDUCATION/TRAINING PROGRAM

## 2025-05-08 PROCEDURE — C1776 JOINT DEVICE (IMPLANTABLE): HCPCS | Performed by: ORTHOPAEDIC SURGERY

## 2025-05-08 PROCEDURE — 250N000009 HC RX 250: Performed by: STUDENT IN AN ORGANIZED HEALTH CARE EDUCATION/TRAINING PROGRAM

## 2025-05-08 PROCEDURE — C1889 IMPLANT/INSERT DEVICE, NOC: HCPCS | Performed by: ORTHOPAEDIC SURGERY

## 2025-05-08 PROCEDURE — 87205 SMEAR GRAM STAIN: CPT | Performed by: ORTHOPAEDIC SURGERY

## 2025-05-08 PROCEDURE — 250N000013 HC RX MED GY IP 250 OP 250 PS 637: Performed by: ORTHOPAEDIC SURGERY

## 2025-05-08 PROCEDURE — 0S990ZX DRAINAGE OF RIGHT HIP JOINT, OPEN APPROACH, DIAGNOSTIC: ICD-10-PCS | Performed by: ORTHOPAEDIC SURGERY

## 2025-05-08 PROCEDURE — 85652 RBC SED RATE AUTOMATED: CPT

## 2025-05-08 PROCEDURE — 360N000078 HC SURGERY LEVEL 5, PER MIN: Performed by: ORTHOPAEDIC SURGERY

## 2025-05-08 PROCEDURE — C1713 ANCHOR/SCREW BN/BN,TIS/BN: HCPCS | Performed by: ORTHOPAEDIC SURGERY

## 2025-05-08 PROCEDURE — 999N000111 HC STATISTIC OT IP EVAL DEFER

## 2025-05-08 PROCEDURE — 999N000065 XR PELVIS PORT 1/2 VIEWS

## 2025-05-08 PROCEDURE — 0SPR0JZ REMOVAL OF SYNTHETIC SUBSTITUTE FROM RIGHT HIP JOINT, FEMORAL SURFACE, OPEN APPROACH: ICD-10-PCS | Performed by: ORTHOPAEDIC SURGERY

## 2025-05-08 PROCEDURE — 87075 CULTR BACTERIA EXCEPT BLOOD: CPT | Performed by: ORTHOPAEDIC SURGERY

## 2025-05-08 PROCEDURE — 258N000001 HC RX 258

## 2025-05-08 PROCEDURE — 89050 BODY FLUID CELL COUNT: CPT | Performed by: ORTHOPAEDIC SURGERY

## 2025-05-08 PROCEDURE — 87070 CULTURE OTHR SPECIMN AEROBIC: CPT | Performed by: ORTHOPAEDIC SURGERY

## 2025-05-08 DEVICE — 6.5MM LOW PROFILE HEX SCREW 25MM
Type: IMPLANTABLE DEVICE | Site: HIP | Status: FUNCTIONAL
Brand: TRIDENT II

## 2025-05-08 DEVICE — GUIDE WIRE
Type: IMPLANTABLE DEVICE | Site: HIP | Status: FUNCTIONAL
Brand: T2 ALPHA

## 2025-05-08 DEVICE — TRIDENT II TRITANIUM MULTIHOLE ACETABULAR SHELL 54E
Type: IMPLANTABLE DEVICE | Site: HIP | Status: FUNCTIONAL
Brand: TRIDENT II

## 2025-05-08 DEVICE — MODULAR HIP SYSTEM
Type: IMPLANTABLE DEVICE | Site: HIP | Status: FUNCTIONAL
Brand: RESTORATION

## 2025-05-08 DEVICE — HEX DOME HOLE PLUG
Type: IMPLANTABLE DEVICE | Site: HIP | Status: FUNCTIONAL
Brand: TRIDENT II

## 2025-05-08 DEVICE — 6.5MM LOW PROFILE HEX SCREW 20MM
Type: IMPLANTABLE DEVICE | Site: HIP | Status: FUNCTIONAL
Brand: TRIDENT II

## 2025-05-08 DEVICE — 6.5MM LOW PROFILE HEX SCREW 15MM
Type: IMPLANTABLE DEVICE | Site: HIP | Status: FUNCTIONAL
Brand: TRIDENT II

## 2025-05-08 DEVICE — CERAMIC V40 FEMORAL HEAD
Type: IMPLANTABLE DEVICE | Site: HIP | Status: FUNCTIONAL
Brand: BIOLOX

## 2025-05-08 DEVICE — 0 DEGREE ECCENTRIC INSERT
Type: IMPLANTABLE DEVICE | Site: HIP | Status: FUNCTIONAL
Brand: TRIDENT

## 2025-05-08 RX ORDER — LIDOCAINE 40 MG/G
CREAM TOPICAL
Status: DISCONTINUED | OUTPATIENT
Start: 2025-05-08 | End: 2025-05-08 | Stop reason: HOSPADM

## 2025-05-08 RX ORDER — KETOROLAC TROMETHAMINE 15 MG/ML
15 INJECTION, SOLUTION INTRAMUSCULAR; INTRAVENOUS EVERY 6 HOURS
Status: DISCONTINUED | OUTPATIENT
Start: 2025-05-08 | End: 2025-05-08

## 2025-05-08 RX ORDER — LIDOCAINE 40 MG/G
CREAM TOPICAL
Status: ACTIVE | OUTPATIENT
Start: 2025-05-08

## 2025-05-08 RX ORDER — POLYETHYLENE GLYCOL 3350 17 G/17G
1 POWDER, FOR SOLUTION ORAL DAILY
Qty: 7 PACKET | Refills: 0 | Status: SHIPPED | OUTPATIENT
Start: 2025-05-08

## 2025-05-08 RX ORDER — NALOXONE HYDROCHLORIDE 0.4 MG/ML
0.1 INJECTION, SOLUTION INTRAMUSCULAR; INTRAVENOUS; SUBCUTANEOUS
Status: DISCONTINUED | OUTPATIENT
Start: 2025-05-08 | End: 2025-05-08 | Stop reason: HOSPADM

## 2025-05-08 RX ORDER — OXYCODONE HYDROCHLORIDE 5 MG/1
5 TABLET ORAL
Refills: 0 | Status: ACTIVE | OUTPATIENT
Start: 2025-05-08

## 2025-05-08 RX ORDER — NALOXONE HYDROCHLORIDE 0.4 MG/ML
0.4 INJECTION, SOLUTION INTRAMUSCULAR; INTRAVENOUS; SUBCUTANEOUS
Status: ACTIVE | OUTPATIENT
Start: 2025-05-08

## 2025-05-08 RX ORDER — ONDANSETRON 2 MG/ML
4 INJECTION INTRAMUSCULAR; INTRAVENOUS EVERY 6 HOURS PRN
Status: ACTIVE | OUTPATIENT
Start: 2025-05-08

## 2025-05-08 RX ORDER — ASPIRIN 81 MG/1
81 TABLET ORAL DAILY
Status: ON HOLD | COMMUNITY

## 2025-05-08 RX ORDER — ACETAMINOPHEN 325 MG/1
975 TABLET ORAL EVERY 8 HOURS
Status: DISCONTINUED | OUTPATIENT
Start: 2025-05-08 | End: 2025-05-08

## 2025-05-08 RX ORDER — MAGNESIUM HYDROXIDE/ALUMINUM HYDROXICE/SIMETHICONE 120; 1200; 1200 MG/30ML; MG/30ML; MG/30ML
30 SUSPENSION ORAL EVERY 4 HOURS PRN
Status: ACTIVE | OUTPATIENT
Start: 2025-05-08

## 2025-05-08 RX ORDER — ONDANSETRON 4 MG/1
4 TABLET, ORALLY DISINTEGRATING ORAL EVERY 8 HOURS PRN
Qty: 10 TABLET | Refills: 0 | Status: SHIPPED | OUTPATIENT
Start: 2025-05-08

## 2025-05-08 RX ORDER — POLYETHYLENE GLYCOL 3350 17 G/17G
17 POWDER, FOR SOLUTION ORAL DAILY
Status: ACTIVE | OUTPATIENT
Start: 2025-05-09

## 2025-05-08 RX ORDER — METHOCARBAMOL 500 MG/1
250 TABLET ORAL EVERY 6 HOURS PRN
Status: DISCONTINUED | OUTPATIENT
Start: 2025-05-08 | End: 2025-05-08

## 2025-05-08 RX ORDER — AMOXICILLIN 250 MG
1-2 CAPSULE ORAL 2 TIMES DAILY
Qty: 15 TABLET | Refills: 0 | Status: SHIPPED | OUTPATIENT
Start: 2025-05-08

## 2025-05-08 RX ORDER — BUPIVACAINE HYDROCHLORIDE 5 MG/ML
INJECTION, SOLUTION EPIDURAL; INTRACAUDAL; PERINEURAL
Status: DISCONTINUED | OUTPATIENT
Start: 2025-05-08 | End: 2025-05-08

## 2025-05-08 RX ORDER — ACETAMINOPHEN 325 MG/1
975 TABLET ORAL EVERY 6 HOURS
Status: DISPENSED | OUTPATIENT
Start: 2025-05-08

## 2025-05-08 RX ORDER — FENTANYL CITRATE 50 UG/ML
25 INJECTION, SOLUTION INTRAMUSCULAR; INTRAVENOUS EVERY 5 MIN PRN
Status: DISCONTINUED | OUTPATIENT
Start: 2025-05-08 | End: 2025-05-08 | Stop reason: HOSPADM

## 2025-05-08 RX ORDER — SODIUM CHLORIDE, SODIUM LACTATE, POTASSIUM CHLORIDE, CALCIUM CHLORIDE 600; 310; 30; 20 MG/100ML; MG/100ML; MG/100ML; MG/100ML
INJECTION, SOLUTION INTRAVENOUS CONTINUOUS
Status: DISCONTINUED | OUTPATIENT
Start: 2025-05-08 | End: 2025-05-08 | Stop reason: HOSPADM

## 2025-05-08 RX ORDER — KETOROLAC TROMETHAMINE 15 MG/ML
15 INJECTION, SOLUTION INTRAMUSCULAR; INTRAVENOUS EVERY 6 HOURS
Status: DISPENSED | OUTPATIENT
Start: 2025-05-08 | End: 2025-05-09

## 2025-05-08 RX ORDER — ONDANSETRON 2 MG/ML
4 INJECTION INTRAMUSCULAR; INTRAVENOUS EVERY 30 MIN PRN
Status: DISCONTINUED | OUTPATIENT
Start: 2025-05-08 | End: 2025-05-08 | Stop reason: HOSPADM

## 2025-05-08 RX ORDER — HYDROXYZINE HYDROCHLORIDE 10 MG/1
10 TABLET, FILM COATED ORAL EVERY 6 HOURS PRN
Status: DISPENSED | OUTPATIENT
Start: 2025-05-08

## 2025-05-08 RX ORDER — CEFAZOLIN SODIUM 2 G/50ML
2 SOLUTION INTRAVENOUS EVERY 8 HOURS
Status: DISPENSED | OUTPATIENT
Start: 2025-05-08 | End: 2025-05-09

## 2025-05-08 RX ORDER — HYDROXYZINE HYDROCHLORIDE 10 MG/1
10 TABLET, FILM COATED ORAL EVERY 6 HOURS PRN
Qty: 30 TABLET | Refills: 0 | Status: SHIPPED | OUTPATIENT
Start: 2025-05-08

## 2025-05-08 RX ORDER — ONDANSETRON 4 MG/1
4 TABLET, ORALLY DISINTEGRATING ORAL EVERY 6 HOURS PRN
Status: ACTIVE | OUTPATIENT
Start: 2025-05-08

## 2025-05-08 RX ORDER — HYDRALAZINE HYDROCHLORIDE 10 MG/1
10 TABLET, FILM COATED ORAL 4 TIMES DAILY PRN
Status: ACTIVE | OUTPATIENT
Start: 2025-05-08

## 2025-05-08 RX ORDER — PANTOPRAZOLE SODIUM 40 MG/1
40 TABLET, DELAYED RELEASE ORAL DAILY PRN
Status: ACTIVE | OUTPATIENT
Start: 2025-05-09

## 2025-05-08 RX ORDER — HEPARIN SOD,PORCINE/0.9 % NACL 30K/1000ML
INTRAVENOUS SOLUTION INTRAVENOUS ONCE
Status: COMPLETED | OUTPATIENT
Start: 2025-05-08 | End: 2025-05-08

## 2025-05-08 RX ORDER — OXYCODONE HYDROCHLORIDE 5 MG/1
10 TABLET ORAL
Refills: 0 | Status: DISPENSED | OUTPATIENT
Start: 2025-05-08

## 2025-05-08 RX ORDER — HYDROMORPHONE HCL IN WATER/PF 6 MG/30 ML
0.2 PATIENT CONTROLLED ANALGESIA SYRINGE INTRAVENOUS
Refills: 0 | Status: CANCELLED | OUTPATIENT
Start: 2025-05-08

## 2025-05-08 RX ORDER — FENTANYL CITRATE 50 UG/ML
100 INJECTION, SOLUTION INTRAMUSCULAR; INTRAVENOUS ONCE
Refills: 0 | Status: COMPLETED | OUTPATIENT
Start: 2025-05-08 | End: 2025-05-08

## 2025-05-08 RX ORDER — FENTANYL CITRATE 50 UG/ML
INJECTION, SOLUTION INTRAMUSCULAR; INTRAVENOUS PRN
Status: DISCONTINUED | OUTPATIENT
Start: 2025-05-08 | End: 2025-05-08

## 2025-05-08 RX ORDER — ASPIRIN 81 MG/1
81 TABLET ORAL 2 TIMES DAILY
Status: DISPENSED | OUTPATIENT
Start: 2025-05-08 | End: 2025-06-07

## 2025-05-08 RX ORDER — ACETAMINOPHEN 325 MG/1
975 TABLET ORAL ONCE
Status: COMPLETED | OUTPATIENT
Start: 2025-05-08 | End: 2025-05-08

## 2025-05-08 RX ORDER — FENTANYL CITRATE 50 UG/ML
50 INJECTION, SOLUTION INTRAMUSCULAR; INTRAVENOUS EVERY 5 MIN PRN
Status: DISCONTINUED | OUTPATIENT
Start: 2025-05-08 | End: 2025-05-08 | Stop reason: HOSPADM

## 2025-05-08 RX ORDER — CEFAZOLIN SODIUM/WATER 2 G/20 ML
2 SYRINGE (ML) INTRAVENOUS
Status: COMPLETED | OUTPATIENT
Start: 2025-05-08 | End: 2025-05-08

## 2025-05-08 RX ORDER — ONDANSETRON 2 MG/ML
INJECTION INTRAMUSCULAR; INTRAVENOUS PRN
Status: DISCONTINUED | OUTPATIENT
Start: 2025-05-08 | End: 2025-05-08

## 2025-05-08 RX ORDER — METHOCARBAMOL 500 MG/1
250 TABLET ORAL 4 TIMES DAILY PRN
Status: ACTIVE | OUTPATIENT
Start: 2025-05-08

## 2025-05-08 RX ORDER — NALOXONE HYDROCHLORIDE 0.4 MG/ML
0.2 INJECTION, SOLUTION INTRAMUSCULAR; INTRAVENOUS; SUBCUTANEOUS
Status: ACTIVE | OUTPATIENT
Start: 2025-05-08

## 2025-05-08 RX ORDER — CELECOXIB 100 MG/1
100 CAPSULE ORAL
Qty: 30 CAPSULE | Refills: 0 | Status: SHIPPED | OUTPATIENT
Start: 2025-05-08 | End: 2025-06-07

## 2025-05-08 RX ORDER — ALBUTEROL SULFATE 90 UG/1
2 INHALANT RESPIRATORY (INHALATION) EVERY 6 HOURS PRN
Status: ACTIVE | OUTPATIENT
Start: 2025-05-08

## 2025-05-08 RX ORDER — PROPOFOL 10 MG/ML
INJECTION, EMULSION INTRAVENOUS CONTINUOUS PRN
Status: DISCONTINUED | OUTPATIENT
Start: 2025-05-08 | End: 2025-05-08

## 2025-05-08 RX ORDER — ASPIRIN 81 MG/1
81 TABLET ORAL 2 TIMES DAILY
Qty: 120 TABLET | Refills: 0 | Status: SHIPPED | OUTPATIENT
Start: 2025-05-08

## 2025-05-08 RX ORDER — HYDROMORPHONE HCL IN WATER/PF 6 MG/30 ML
0.4 PATIENT CONTROLLED ANALGESIA SYRINGE INTRAVENOUS
Refills: 0 | Status: CANCELLED | OUTPATIENT
Start: 2025-05-08

## 2025-05-08 RX ORDER — SODIUM CHLORIDE, SODIUM LACTATE, POTASSIUM CHLORIDE, CALCIUM CHLORIDE 600; 310; 30; 20 MG/100ML; MG/100ML; MG/100ML; MG/100ML
INJECTION, SOLUTION INTRAVENOUS CONTINUOUS
Status: ACTIVE | OUTPATIENT
Start: 2025-05-08

## 2025-05-08 RX ORDER — LIDOCAINE 50 MG/G
OINTMENT TOPICAL 4 TIMES DAILY PRN
Status: DISPENSED | OUTPATIENT
Start: 2025-05-08

## 2025-05-08 RX ORDER — CELECOXIB 100 MG/1
100 CAPSULE ORAL ONCE
Status: COMPLETED | OUTPATIENT
Start: 2025-05-08 | End: 2025-05-08

## 2025-05-08 RX ORDER — AMOXICILLIN 250 MG
1 CAPSULE ORAL 2 TIMES DAILY
Status: DISPENSED | OUTPATIENT
Start: 2025-05-08

## 2025-05-08 RX ORDER — DEXAMETHASONE SODIUM PHOSPHATE 10 MG/ML
INJECTION, SOLUTION INTRAMUSCULAR; INTRAVENOUS PRN
Status: DISCONTINUED | OUTPATIENT
Start: 2025-05-08 | End: 2025-05-08

## 2025-05-08 RX ORDER — OXYCODONE HYDROCHLORIDE 5 MG/1
10 TABLET ORAL EVERY 4 HOURS PRN
Status: DISCONTINUED | OUTPATIENT
Start: 2025-05-08 | End: 2025-05-08

## 2025-05-08 RX ORDER — ONDANSETRON 4 MG/1
4 TABLET, ORALLY DISINTEGRATING ORAL EVERY 30 MIN PRN
Status: DISCONTINUED | OUTPATIENT
Start: 2025-05-08 | End: 2025-05-08 | Stop reason: HOSPADM

## 2025-05-08 RX ORDER — GABAPENTIN 300 MG/1
900 CAPSULE ORAL AT BEDTIME
Status: DISPENSED | OUTPATIENT
Start: 2025-05-08

## 2025-05-08 RX ORDER — MAGNESIUM HYDROXIDE 1200 MG/15ML
LIQUID ORAL PRN
Status: DISCONTINUED | OUTPATIENT
Start: 2025-05-08 | End: 2025-05-08 | Stop reason: HOSPADM

## 2025-05-08 RX ORDER — CEFAZOLIN SODIUM/WATER 2 G/20 ML
2 SYRINGE (ML) INTRAVENOUS SEE ADMIN INSTRUCTIONS
Status: DISCONTINUED | OUTPATIENT
Start: 2025-05-08 | End: 2025-05-08 | Stop reason: HOSPADM

## 2025-05-08 RX ORDER — BISACODYL 10 MG
10 SUPPOSITORY, RECTAL RECTAL DAILY PRN
Status: ACTIVE | OUTPATIENT
Start: 2025-05-08

## 2025-05-08 RX ORDER — KETOROLAC TROMETHAMINE 30 MG/ML
INJECTION, SOLUTION INTRAMUSCULAR; INTRAVENOUS PRN
Status: DISCONTINUED | OUTPATIENT
Start: 2025-05-08 | End: 2025-05-08

## 2025-05-08 RX ORDER — TRANEXAMIC ACID 650 MG/1
1950 TABLET ORAL ONCE
Status: COMPLETED | OUTPATIENT
Start: 2025-05-08 | End: 2025-05-08

## 2025-05-08 RX ORDER — TRIAMCINOLONE ACETONIDE 1 MG/G
CREAM TOPICAL 2 TIMES DAILY PRN
Status: ON HOLD | COMMUNITY

## 2025-05-08 RX ORDER — DEXAMETHASONE SODIUM PHOSPHATE 4 MG/ML
4 INJECTION, SOLUTION INTRA-ARTICULAR; INTRALESIONAL; INTRAMUSCULAR; INTRAVENOUS; SOFT TISSUE
Status: DISCONTINUED | OUTPATIENT
Start: 2025-05-08 | End: 2025-05-08 | Stop reason: HOSPADM

## 2025-05-08 RX ORDER — PROCHLORPERAZINE MALEATE 5 MG/1
5 TABLET ORAL EVERY 6 HOURS PRN
Status: ACTIVE | OUTPATIENT
Start: 2025-05-08

## 2025-05-08 RX ORDER — SODIUM CHLORIDE 9 MG/ML
INJECTION, SOLUTION INTRAVENOUS CONTINUOUS PRN
Status: DISCONTINUED | OUTPATIENT
Start: 2025-05-08 | End: 2025-05-08

## 2025-05-08 RX ORDER — VANCOMYCIN HYDROCHLORIDE 1 G/20ML
1 INJECTION, POWDER, LYOPHILIZED, FOR SOLUTION INTRAVENOUS ONCE
Status: COMPLETED | OUTPATIENT
Start: 2025-05-08 | End: 2025-05-08

## 2025-05-08 RX ORDER — OXYCODONE HYDROCHLORIDE 5 MG/1
5 TABLET ORAL EVERY 4 HOURS PRN
Status: DISCONTINUED | OUTPATIENT
Start: 2025-05-08 | End: 2025-05-08

## 2025-05-08 RX ORDER — LIDOCAINE HYDROCHLORIDE 10 MG/ML
INJECTION, SOLUTION INFILTRATION; PERINEURAL PRN
Status: DISCONTINUED | OUTPATIENT
Start: 2025-05-08 | End: 2025-05-08

## 2025-05-08 RX ADMIN — DEXMEDETOMIDINE HYDROCHLORIDE 8 MCG: 100 INJECTION, SOLUTION INTRAVENOUS at 08:29

## 2025-05-08 RX ADMIN — ONDANSETRON 4 MG: 2 INJECTION INTRAMUSCULAR; INTRAVENOUS at 10:33

## 2025-05-08 RX ADMIN — PHENYLEPHRINE HYDROCHLORIDE 100 MCG: 10 INJECTION INTRAVENOUS at 08:20

## 2025-05-08 RX ADMIN — ACETAMINOPHEN 975 MG: 325 TABLET, FILM COATED ORAL at 06:03

## 2025-05-08 RX ADMIN — TRANEXAMIC ACID 1950 MG: 650 TABLET ORAL at 06:03

## 2025-05-08 RX ADMIN — METHOCARBAMOL 250 MG: 500 TABLET ORAL at 15:29

## 2025-05-08 RX ADMIN — MIDAZOLAM 2 MG: 1 INJECTION INTRAMUSCULAR; INTRAVENOUS at 07:41

## 2025-05-08 RX ADMIN — TIZANIDINE 4 MG: 4 TABLET ORAL at 22:04

## 2025-05-08 RX ADMIN — OXYCODONE HYDROCHLORIDE 10 MG: 5 TABLET ORAL at 12:53

## 2025-05-08 RX ADMIN — GABAPENTIN 900 MG: 300 CAPSULE ORAL at 22:04

## 2025-05-08 RX ADMIN — DEXMEDETOMIDINE HYDROCHLORIDE 12 MCG: 100 INJECTION, SOLUTION INTRAVENOUS at 07:54

## 2025-05-08 RX ADMIN — FENTANYL CITRATE 100 MCG: 50 INJECTION INTRAMUSCULAR; INTRAVENOUS at 13:47

## 2025-05-08 RX ADMIN — SODIUM CHLORIDE, SODIUM LACTATE, POTASSIUM CHLORIDE, AND CALCIUM CHLORIDE: .6; .31; .03; .02 INJECTION, SOLUTION INTRAVENOUS at 11:20

## 2025-05-08 RX ADMIN — BUPIVACAINE HYDROCHLORIDE 3 ML: 5 INJECTION, SOLUTION EPIDURAL; INTRACAUDAL; PERINEURAL at 07:57

## 2025-05-08 RX ADMIN — SODIUM CHLORIDE: 0.9 INJECTION, SOLUTION INTRAVENOUS at 10:15

## 2025-05-08 RX ADMIN — PROPOFOL 200 MCG/KG/MIN: 10 INJECTION, EMULSION INTRAVENOUS at 07:58

## 2025-05-08 RX ADMIN — ACETAMINOPHEN 975 MG: 325 TABLET, FILM COATED ORAL at 19:20

## 2025-05-08 RX ADMIN — CEFAZOLIN SODIUM 2 G: 2 SOLUTION INTRAVENOUS at 22:42

## 2025-05-08 RX ADMIN — CELECOXIB 100 MG: 100 CAPSULE ORAL at 06:03

## 2025-05-08 RX ADMIN — LIDOCAINE: 50 OINTMENT TOPICAL at 15:29

## 2025-05-08 RX ADMIN — ALBUMIN (HUMAN): 12.5 SOLUTION INTRAVENOUS at 09:40

## 2025-05-08 RX ADMIN — Medication 2 G: at 07:41

## 2025-05-08 RX ADMIN — FENTANYL CITRATE 100 MCG: 50 INJECTION INTRAMUSCULAR; INTRAVENOUS at 12:54

## 2025-05-08 RX ADMIN — KETOROLAC TROMETHAMINE 15 MG: 15 INJECTION, SOLUTION INTRAMUSCULAR; INTRAVENOUS at 19:20

## 2025-05-08 RX ADMIN — FENTANYL CITRATE 50 MCG: 50 INJECTION INTRAMUSCULAR; INTRAVENOUS at 10:38

## 2025-05-08 RX ADMIN — ACETAMINOPHEN 975 MG: 325 TABLET, FILM COATED ORAL at 13:47

## 2025-05-08 RX ADMIN — SODIUM CHLORIDE, SODIUM LACTATE, POTASSIUM CHLORIDE, AND CALCIUM CHLORIDE: .6; .31; .03; .02 INJECTION, SOLUTION INTRAVENOUS at 07:15

## 2025-05-08 RX ADMIN — CEFAZOLIN SODIUM 2 G: 2 SOLUTION INTRAVENOUS at 15:29

## 2025-05-08 RX ADMIN — LIDOCAINE HYDROCHLORIDE 5 ML: 10 INJECTION, SOLUTION INFILTRATION; PERINEURAL at 07:58

## 2025-05-08 RX ADMIN — FENTANYL CITRATE 50 MCG: 50 INJECTION INTRAMUSCULAR; INTRAVENOUS at 11:13

## 2025-05-08 RX ADMIN — ASPIRIN 81 MG: 81 TABLET, COATED ORAL at 20:01

## 2025-05-08 RX ADMIN — ALBUMIN (HUMAN): 12.5 SOLUTION INTRAVENOUS at 10:18

## 2025-05-08 RX ADMIN — OXYCODONE 10 MG: 5 TABLET ORAL at 16:46

## 2025-05-08 RX ADMIN — OXYCODONE 10 MG: 5 TABLET ORAL at 20:01

## 2025-05-08 RX ADMIN — HYDROXYZINE HYDROCHLORIDE 10 MG: 10 TABLET, FILM COATED ORAL at 12:57

## 2025-05-08 RX ADMIN — PHENYLEPHRINE HYDROCHLORIDE 0.3 MCG/KG/MIN: 10 INJECTION INTRAVENOUS at 08:15

## 2025-05-08 RX ADMIN — SENNOSIDES AND DOCUSATE SODIUM 1 TABLET: 50; 8.6 TABLET ORAL at 20:01

## 2025-05-08 RX ADMIN — DEXAMETHASONE SODIUM PHOSPHATE 4 MG: 10 INJECTION, SOLUTION INTRAMUSCULAR; INTRAVENOUS at 09:12

## 2025-05-08 RX ADMIN — KETOROLAC TROMETHAMINE 15 MG: 30 INJECTION, SOLUTION INTRAMUSCULAR at 10:33

## 2025-05-08 ASSESSMENT — ACTIVITIES OF DAILY LIVING (ADL)
ADLS_ACUITY_SCORE: 46
ADLS_ACUITY_SCORE: 48
ADLS_ACUITY_SCORE: 49
ADLS_ACUITY_SCORE: 46
ADLS_ACUITY_SCORE: 46
ADLS_ACUITY_SCORE: 49
ADLS_ACUITY_SCORE: 49
ADLS_ACUITY_SCORE: 46
ADLS_ACUITY_SCORE: 49
ADLS_ACUITY_SCORE: 49
ADLS_ACUITY_SCORE: 46
ADLS_ACUITY_SCORE: 49
ADLS_ACUITY_SCORE: 46
ADLS_ACUITY_SCORE: 46
ADLS_ACUITY_SCORE: 49
ADLS_ACUITY_SCORE: 46

## 2025-05-08 NOTE — OP NOTE
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    Anesthesia Type:  Regional    Antibiotics:  Ancef 2 g IVPB on induction  Ancef 3 g and 3 L for irrigation  Vancomycin powder 1 g    Condition on discharge from OR:  stable    Indications:  64-year-old female status post primary right MISSY 8/8/2015.  Developed the gradual insidious atraumatic onset of of right MISSY/hip related pain thereafter but was lost to follow-up until 2024 when she represented with severe right hip pain, radiographic evidence for femoral component loosening, and elevated inflammatory markers consistent with prosthetic joint infection.  Repeated cultures have been negative.  However she has been presumed to have a prosthetic joint infection and treated as such.  She underwent explantation with antibiotic spacer placement 11/20/2024 and a course of antibiotics.  Her inflammatory markers have normalized and a subsequent Synovasure aspirate was negative for alpha defensin with a diminished synovial fluid WBC and low PMN percentage.  Patient has remained afebrile and reasonably comfortable.  Her current symptoms include buttock pain and back pain only with no groin or anterior thigh pain.  She is minimally short compared to the other side due to subsidence of the spacer.    Findings:  No  evidence for sepsis.  Synovial fluid   Synovial fluid neutrophils 25%  Synovial fluid aerobic/anaerobic culture pending      Operative Report:     Upon informed consent having reviewed the procedure along with attendant risk of complication, an informed collaborative decision was made to proceed.  All protocols and regimens were followed.  Patient cleared by primary care and [facility].  Consent obtained.  Risks reviewed.  Leg was marked.  Patient premedicated brought to the operative room undergoing anesthetic induction.  Placed in the left lateral decubitus position using the Stulberg hip montalvo.  Bony prominences were appropriately padded and the pelvis was properly positioned.  Lower extremities were superimposed for leg length comparison and the operative leg was suspended 8-minute Betadine scrub, alcohol washed and chloro prepped in its entirety.  Ioban drapes were placed and all exposed skin.    We pause for patient identification, limb and procedure confirmation.    The previous incision was used and dissection carried down to the IT band.  Fascia was incised and a self-retaining retractor was placed.  The lower extremity was internally rotated and a posterior approach was performed.  I perforated the capsule and harvested 5 cc of clear lui fluid x 2 sent for stat cell count and differential as well as standard aerobic/anaerobic culture.  The capsule was tagged with an 0 Ethibond suture taken down in a normal fashion longitudinally along the femoral attachment then radially in line with the piriformis tendon.  It was reflected posteriorly.  There was no purulence and no evidence for infection.  There is no synovitis.  I dislocated the hip easily and surveyed the construct.  The Dat extractor was placed on the proximal femur and the femoral spacer was removed without difficulty.  A distal pedestal could be felt.  I debrided the proximal femur and laid it flat on the table and subsequently mobilized the  proximal femur anterior to the acetabulum to obtain good exposure.  A Hohmann retractor was placed anteriorly and inferiorly with self-retaining retractor reflecting the intact capsular flaps.  I dissected soft tissue and methylmethacrylate from around the edge of the poly spacer.  Piecemeal, PMMA was removed.    Then, I made radial cuts into the polyethylene circumferentially and piecemeal removed the polyliner without difficulty.  The underlying cement mantle was then transected with quarter-inch osteotome and removed removed piecemeal as well with no bone loss.  There was a small defect in the acetabular rim from 9:00 to 11:00 with an otherwise intact concentric hemispheric acetabulum.  All soft tissue was debrided.    Then, reaming commenced at 45 mm progressing in 1 mm increments to 52 mm aligned aligned.  The 52 mm Trident 2 TriTanium shell was then impacted in approximately 35 degrees of inclination and 25 degrees of anteversion flush with the native acetabular portal.  It seated optimally and fully with full contact fit.  Then, acetabular screws x 6 were placed through the shell into the acetabular sourcil with good purchase.  The construct was stable.  A trial liner was inserted and attention was then directed to the proximal femur.    The femur was then positioned per routine.  I scoured the medullary canal and subsequently removed the distal pedestal.  Confirming entry into the medullary canal, reaming of the canal was performed per routine to accommodate a 190 mm short restoration modular femoral stem.  Reaming progressed from 13 mm in 1 mm increments to 18 mm.  At 18 mm there was good resistance with chatter and [id number] filled with bone.  An intraoperative x-ray AP and lateral was then obtained to confirm complete fit and fill of the 18 mm reamer in the canal.  This signified proper sizing.    Per routine, I then reamed up to 19 mm with great difficulty to the appropriate level -10 to 15 mm below  the trochanteric tip to allow for proper sizing of the proximal femoral portion of the modular stem to dial in appropriate lower extremity length.    The 19 mm x 115 mm restoration modular stem was then inserted and impacted to the correct level.    I then overreamed the proximal femurs in a stepwise fashion from 19 mm to 23 mm.  The 23 mm body was then fit on the 19 mm stem starting at +0 mm progressing to +30 mm.  The +30 mm x 23 mm body was optimal and anteversion was dialed in.  Initially I trialed a 40 mm head and then switched to the 36 mm head.    A trial reduction was then performed with a +6 mm neutral acetabular trial liner and a +0 mm x 36 mm trial head.  Lower extremities were equal.  Offset was increased.  Stability was assured to 90 degrees of flexion, almost 90 degrees of internal rotation and adduction 15 degrees with no anterior instability.  The construct was felt to be stable and appropriate and the hip was dislocated and trial components were retrieved.    The actua +30 mm x 23 mm modular body was then inserted in proper anteversion impacted in place and then the conical screw was placed and tightened.  The torque wrench assured proper tightening.  The trunnion was cleaned and dried and the +0 mm x 36 mm ceramic head was affixed.  The hip was reduced and found to be stable as above.    3.5% aqueous Betadine solution soak.  Thorough lavage with multiple liters of antibiotic laden irrigant.  The capsule was tagged with a #5 Ethibond suture.    The capsule was repaired to itself superiorly.  Then the capsule was repaired to the proximal femur per routine in a near watertight fashion.  Vancomycin powder was then placed extracapsular.  A drain was not necessary.    The overlying IT band was repaired with a combination of interrupted nonabsorbable suture followed by a running strata fix stitch.    Subcutaneous tissue and skin closed in layers.    450 cc Cell Saver recovery with 150 cc  reinfusion.    There were no complications.  Counts were correct and the patient was sent to the [facility] in stable condition.    Counts were correct. Bleeding was largely from reamed cancellous surfaces of the acetabulum as well as from the medullary canal of the femur. The patient tolerated the procedure well and was sent to the [facility] in stable condition.      Estimated Blood Loss:   450 cc Cell Saver recovery  150 cc reinfusion    Specimens:    ID Type Source Tests Collected by Time Destination   A : Right hip synovial fluid Synovial fluid Hip, Right CELL COUNT WITH DIFFERENTIAL FLUID Rios Sanches MD 5/8/2025  8:35 AM    B : Right hip synovial fluid for Culture Synovial fluid Hip, Right ANAEROBIC BACTERIAL CULTURE ROUTINE, GRAM STAIN, AEROBIC BACTERIAL CULTURE ROUTINE Rios Sanches MD 5/8/2025  8:37 AM            Drains:   None    Complications:    None    Rois Sanches MD     Date: 5/8/2025  Time: 11:03 AM      Implant Name Type Inv. Item Serial No.  Lot No. LRB No. Used Action   IMP SHELL ACETABULAR STRK TRI 54MM HIP MULTIHOLE 709-04-54E - [id number] Total Joint Component/Insert IMP SHELL ACETABULAR STRK TRI 54MM HIP MULTIHOLE 709-04-54E  [employer] [id number] Right 1 Implanted   IMP SCR STRK LOW PROFILE HEX 6.5X25MM 4833-6409 - [id number] Metallic Hardware/[place] IMP SCR STRK LOW PROFILE HEX 6.5X25MM [id number]  [employer] [id number] Right 1 Implanted   6.5MM LOW PROFILE HEX SCR 15MM - [id number] Metallic Hardware/[place] 6.5MM LOW PROFILE HEX SCR 15MM  [employer] [id number] Right 1 Implanted   IMP PLUG STRK HEX DOME TRIDENT 0915-6877 - [id number] Metallic Hardware/Aragon IMP PLUG STRK HEX DOME TRIDENT 5334-3968  [employer] [id number] Right 1 Implanted   6.5MM LOW PROFILE HEX SCR 15MM - [id number] Metallic Hardware/Aragon 6.5MM LOW PROFILE HEX SCR 15MM  [employer] [id number] Right 1 Implanted   6.5MM LOW PROFILE HEX SCR 20MM - [id number] Metallic Hardware/[place]agon 6.5MM LOW PROFILE HEX SCR  20MM  [employer] [id number] Right 1 Implanted   BONE CEMENT SIMPLEX W/TOBRAMYCIN 6197-9-001 - [id number] Cement, Bone IMP BONE CEMENT STRK SIMPLEX TOBRAMYCIN 40 6197-9-001  [employer] [id number] Right 1 Explanted   6.5MM LOW PROFILE HEX SCR 20MM - [id number] Metallic Hardware/[place] 6.5MM LOW PROFILE HEX SCR 20MM  [employer] [id number] Right 1 Implanted   BONE CEMENT SIMPLEX W/TOBRAMYCIN 6197-9-001 - [id number] Cement, Bone IMP BONE CEMENT STRK SIMPLEX TOBRAMYCIN 40 6197-9-001  [employer] [id number] Right 1 Explanted   6.5MM LOW PROFILE HEX SCR 20MM - [id number] Metallic Hardware/[place] 6.5MM LOW PROFILE HEX SCR 20MM  [employer] [id number] Right 1 Implanted   BONE CEMENT SIMPLEX W/TOBRAMYCIN 6197-9-001 - [id number] Cement, Bone IMP BONE CEMENT STRK SIMPLEX TOBRAMYCIN 40 6197-9-001  [employer] [id number] Right 1 Explanted   LINER ACETABULAR ALTRX +4 NEUT 36L00AJ - [id number] Total Joint Component/Insert LINER ACETABULAR ALTRX +4 NEUT [id number]  [employer]- [id number] Right 1 Explanted   STEM FEMORAL PROSTALAC HIGH 105MM SZ 1 - [id number] Total Joint Component/Insert STEM FEMORAL PROSTALAC HIGH 105MM SZ 1  [employer]- [id number] Right 1 Explanted   HEAD FEMORAL HIP ARTICUL ASPHERE MSPEC 5 - [id number] Total Joint Component/Insert HEAD FEMORAL HIP ARTICUL ASPHERE MSPEC 5  [employer]- [id number] Right 1 Explanted   STEM FEM 115MM 19MM MDLR RSTR HIP DIST STRL  6276-7-419 - [id number] Total Joint Component/Insert STEM FEM 115MM 19MM MDLR RSTR HIP DIST STRL  6276-7-419  [employer] [id number] Right 1 Implanted   INSERT TRIDENT 0DEG X3 ECCENTRIC 36MM - [id number] Total Joint Component/Insert INSERT TRIDENT 0DEG X3 ECCENTRIC 36MM  [employer] [id number] Right 1 Implanted   IMP STEM FEM MOD REV HIP PROX BODY/BOLT 23MM +30 - [id number] Total Joint Component/Insert IMP STEM FEM MOD REV HIP PROX BODY/BOLT 23MM +30  [employer] [id number] Right 1 Implanted   IMP  HEAD FEMORAL STRK BIOLOX DELTA CERAMIC 36MM +0MM - [id number] Total Joint Component/Insert IMP HEAD FEMORAL STRK BIOLOX DELTA CERAMIC 36MM +0MM  [employer] [id number] Right 1 Implanted

## 2025-05-08 NOTE — PHARMACY-ADMISSION MEDICATION HISTORY
Pharmacist Admission Medication History    Admission medication history is complete. The information provided in this note is only as accurate as the sources available at the time of the update.    Information Source(s): Patient and CareEverywhere/SureScripts via in-person    Pertinent Information: N/A    Changes made to PTA medication list:  Added: Triamcinolone cream  Deleted: None  Changed: aspirin to qday    Allergies reviewed with patient and updates made in EHR: yes    Medication History Completed By: Shell Magana [facility] 5/8/2025 7:27 AM    PTA Med List   Medication Sig Last Dose/Taking    acetaminophen (TYLENOL) 325 MG tablet Take 3 tablets (975 mg) by mouth every 6 hours. 5/7/2025    albuterol (PROAIR HFA/PROVENTIL HFA/VENTOLIN HFA) 108 (90 Base) MCG/ACT inhaler Inhale 2 puffs into the lungs every 6 hours as needed for shortness of breath, wheezing or cough. More than a month    aspirin 81 MG EC tablet Take 81 mg by mouth daily. 5/7/2025    ferrous sulfate (FE TABS) 325 (65 Fe) MG EC tablet Take 1 tablet (325 mg) by mouth every other day. Past Week    gabapentin (NEURONTIN) 300 MG capsule Take 900 mg by mouth at bedtime. 5/7/2025 Bedtime    lidocaine (XYLOCAINE) 5 % external ointment Apply topically 4 times daily. (Patient taking differently: Apply topically 4 times daily as needed.) Unknown    omeprazole (PRILOSEC OTC) 20 MG EC tablet Take 20 mg by mouth daily as needed. 5/8/2025 Morning    tiZANidine (ZANAFLEX) 2 MG tablet Take 4 mg by mouth at bedtime. 5/7/2025 Bedtime    triamcinolone (KENALOG) 0.1 % external cream Apply topically 2 times daily as needed for irritation. Unknown

## 2025-05-08 NOTE — ANESTHESIA POSTPROCEDURE EVALUATION
Patient: Rosemary Kay    Procedure: Procedure(s):  RIGHT TOTAL HIP ARTHROPLASTY POSTERIOR REVISION  EXPLANT OF SPACER       Anesthesia Type:  Spinal    Note:     Postop Pain Control: Uneventful            Sign Out: Well controlled pain   PONV: No   Neuro/Psych: Uneventful            Sign Out: Acceptable/Baseline neuro status   Airway/Respiratory: Uneventful            Sign Out: Acceptable/Baseline resp. status   CV/Hemodynamics: Uneventful            Sign Out: Acceptable CV status; No obvious hypovolemia; No obvious fluid overload   Other NRE: NONE   DID A NON-ROUTINE EVENT OCCUR? No           Last vitals:  Vitals Value Taken Time   /92 05/08/25 14:31   Temp 36.4  C (97.5  F) 05/08/25 12:10   Pulse 56 05/08/25 14:37   Resp 20 05/08/25 13:30   SpO2 100 % 05/08/25 14:37   Vitals shown include unfiled device data.    Electronically Signed By: Miguel Gonzalez MD  May 8, 2025  2:48 PM

## 2025-05-08 NOTE — PROGRESS NOTES
"[facility] ACUTE PAIN SERVICE CONSULTATION   [facility], [facility], [facility], [facility], [place]     Date of Admission:  5/8/2025  Date of Consult (When I saw the patient): 05/08/25  Physician requesting consult: Dr. Rios Sanches      Assessment/Plan:     Rosemary Kay is a 64 year old female who was admitted on 5/8/2025.  Pain team was asked to see the patient for post op pain, multiple allergies. Admitted for planned procedure.     Post op day: * Day of Surgery *. RIGHT TOTAL HIP ARTHROPLASTY POSTERIOR REVISION. EXPLANT OF SPACER    Pt seen with therapy after arriving on floor, pain team contacted by pharmacy re: urgency to see pt on floor post op. Pt able to get up to walker walk in hallway and back, no limitations other than as expected post op. Daughter in room, did well with pain regimen in November after R hip surgery (oxycodone, lidocaine ointment, celebrex, tizanidine, apap, gabapentin, hydroxyzine)- pain team consulted on 11/8. Reviewed plan with patient and daughter. Stated robaxin given prior to assessment was helpful, and pt reports pain as \"good\" during assessment. IF pain crisis occurs can consider one time dose of fentanl 50 mcg IV on floor - however given short pain relief would try to avoid and adjust po meds as needed. Notified Dr. Sanches re: recommendations below.     PLAN:   1) Pain is consistent with post op pain.   Multimodal Medication Therapy  Topical: none  NSAID'S: CrCl 93.7, toradol 15 mg IV q6h x 3 doses - was to start at 1800, 12 hours after celebrex given at 0600, did move this up an hour to 1700 for pain as low dose celebrex given and low dose toradol ordered - once this is completed, resume home celebrex 200 mg po daily after that - can increase if renal function, hgb and plt WNL  Muscle Relaxants: home tizandine 4 mg po at bedtime, and prn robaxin 250 mg po - change from q6h prn to qid prn to allow for more flexibility  Adjuvants: APAP 975 mg po q8h - increase to q6h, continue " "home gabapentin 900 mg po qhs  Antidepressants/anxiolytics: hydroxyzine 10 mg po q6h prn   Opioids: continue oxycodone 5-10 mg po - increase frequency to q3h prn - since unable to have IV d/t allergies  IV Pain medication: none  Non-medication interventions: per nursing  Constipation Prophylaxis: scheduled and prn - history of OIC    -MN  pulled from system on 5/8/25. This indicates fills gabapentin reglarly, last filled oxycodone 5 mg #30 (30 day supply) after her last hip surgery from [facility]  Discharge Recommendations - We recommend prescribing the following at the time of discharge: likely rx for oxycodone 5 mg, continue adjuvants     History of Present Illness (HPI):       Rosemary Kay is a 64 year old female with past medical history as above.     Per MN  review, the patient does not have an opioid tolerance.     Reviewed medical record, labs, imaging, ED note, and care everywhere.       Medical History   PAST MEDICAL HISTORY:   Past Medical History:   Diagnosis Date    Anemia     Arthritis     Gastroesophageal reflux disease     Liver disease     Obese        Allergies  Allergies   Allergen Reactions    Dilaudid [Hydromorphone] Unknown     Pt \"Turns into a monster and doesn't even know who she is\"    Morphine Unknown     Feels like skin is crawling and \"I feel like a monster\". I CANNOT have morphine and Hydromorphone. Ok to take oxycodone ONLY     Codeine Unknown     Feels like skin is crawling but can take with benadryl.    Demerol [Meperidine] Unknown     Feels like skin is crawling but can take with benadryl.    Iodinated Contrast Media [Iodinated Contrast Media] Hives    Latex Unknown     condoms    Percocet [Oxycodone-Acetaminophen]      Itchy, irritable-able to take with visiteril    Tramadol Itching     Itching but can take with benadryl    Vicodin [Hydrocodone-Acetaminophen] Unknown     Itchy, irritable       Prior to Admission Medications   Medications Prior to Admission "   Medication Sig Dispense Refill Last Dose/Taking    acetaminophen (TYLENOL) 325 MG tablet Take 3 tablets (975 mg) by mouth every 6 hours.   5/7/2025    albuterol (PROAIR HFA/PROVENTIL HFA/VENTOLIN HFA) 108 (90 Base) MCG/ACT inhaler Inhale 2 puffs into the lungs every 6 hours as needed for shortness of breath, wheezing or cough.   More than a month    aspirin 81 MG EC tablet Take 81 mg by mouth daily.   5/7/2025    ferrous sulfate (FE TABS) 325 (65 Fe) MG EC tablet Take 1 tablet (325 mg) by mouth every other day. 60 tablet 0 Past Week    gabapentin (NEURONTIN) 300 MG capsule Take 900 mg by mouth at bedtime.   5/7/2025 Bedtime    lidocaine (XYLOCAINE) 5 % external ointment Apply topically 4 times daily. (Patient taking differently: Apply topically 4 times daily as needed.) 50 g 0 Unknown    omeprazole (PRILOSEC OTC) 20 MG EC tablet Take 20 mg by mouth daily as needed.   5/8/2025 Morning    tiZANidine (ZANAFLEX) 2 MG tablet Take 4 mg by mouth at bedtime.   5/7/2025 Bedtime    triamcinolone (KENALOG) 0.1 % external cream Apply topically 2 times daily as needed for irritation.   Unknown       Thank you for this consultation.    Key Oh, PharmD, BCPS  Acute Care Inpatient Pain Management Program  [facility] ([facility])  Hours of coverage Monday-Friday [id number]. After hours please contact Primary team   Page via Epic chat or [employer]

## 2025-05-08 NOTE — INTERVAL H&P NOTE
"I have reviewed the surgical (or preoperative) H&P that is linked to this encounter, and examined the patient. There are no significant changes    Clinical Conditions Present on Arrival:  Clinically Significant Risk Factors Present on Admission                    # Drug Induced Platelet Defect: home medication list includes an antiplatelet medication        # Obesity: Estimated body mass index is 36.67 kg/m  as calculated from the following:    Height as of this encounter: 1.702 m (5' 7\").    Weight as of this encounter: 106.2 kg (234 lb 1.6 oz).           "

## 2025-05-08 NOTE — TREATMENT PLAN
Orthopedic Surgery Pre-Op Plan: Rosemary Kay  pre-op review. This is NOT an H&P   Surgeon: Dr. Sanches    [facility]: [facility]  Name of Surgery: Right Total Hip Arthroplasty Posterior Revision with Explant of Spacer  Date of Surgery: 5/8/25  H&P: Completed on 4/28/25 by Estela Jiménez PA-C at [facility].   History of ASA, NSAIDS, vitamin and/or herbal supplements, GLP-1 Agonist or SGLT Inhibitor medication taken within 10 days?: Yes: On ASA 81 mg daily.  Patient has held ASA since 4/16/2025.    History of blood thinners?: No    Plan:   1) Discharge Plan: Home on POD 1 or POD 2 when medically stable for discharge with assist of Daughter. Please see Discharge Planning section near bottom of this note for further details.     2) History of Chronic Right Prosthetic Hip Infection: S/P Explant and Antibiotic Spacer placement on 11/5/24:  Infectious Disease was consulted. Completed several weeks of IV antibiotics. Will now proceed with Right Total Hip Arthroplasty Posterior Revision with Explant of Spacer.     2) Hypertriglyceridemia: Not on any medications for this.     3) Obstructive Sleep Apnea: Does not use CPAP.  I recommend close monitoring of postop respiratory status.  If frequent O2 desats or apneic episodes, nursing to please notify Hospitalist.     4) Obesity: BMI 38, Wt: 240 lbs. I recommend continued efforts at safe weight loss following recovery from surgery.     5) GERD: On omeprazole.     6) Bipolar 1 Disorder and Anxiety: Per PCP-had previously followed with psychiatry.  Not currently on any medications for this.      7) History of Left Breast Cancer (Invasive Ductal Carcinoma): S/P Left Lumpectomy and Radiation Treatment in 2016:     Patient appears medically optimized for upcoming surgery. I would recommend Hospitalist Consult to assist with medical management. Please call me below with any questions on this patient.       Review of Systems Notable for: History of chronic right prosthetic hip  infection-s/p explant and antibiotic spacer placement on 11/5/2024, hypertriglyceridemia, obstructive sleep apnea-does not use CPAP, obesity, GERD, bipolar 1 disorder and anxiety, history of left breast cancer-s/p left lumpectomy and radiation treatment in 2016.    Past Medical History:   Past Medical History:   Diagnosis Date    Anemia     Arthritis     Gastroesophageal reflux disease     Liver disease     Obese      Past Surgical History:   Procedure Laterality Date    ARTHROPLASTY REVISION HIP Right 11/05/2024    Procedure: RIGHT TOTAL HIP ARTHROPLASTY POSTERIOR REVISION BOTH COMPONENTS;  Surgeon: Rios Sanches MD;  Location: [facility] Main OR    BIOPSY BREAST Left 09/13/2016    Procedure: Evacuation of Hematoma Left Breast;  Surgeon: Halina Pagan MD;  Location: [facility] OR;  Service:     IR PICC CHECK RIGHT  11/06/2024    JOINT REPLACEMENT Left     KIDNEY SURGERY      mass excision on the left    LUMPECTOMY BREAST Left 09/07/2016    DR. PAGAN    LUMPECTOMY BREAST Left 09/07/2016    Dr. Pagan    PICC SINGLE LUMEN PLACEMENT Right 11/06/2024       Current Medications:  Patient's Medications   New Prescriptions    No medications on file   Previous Medications    ACETAMINOPHEN (TYLENOL) 325 MG TABLET    Take 3 tablets (975 mg) by mouth every 6 hours.    ALBUTEROL (PROAIR HFA/PROVENTIL HFA/VENTOLIN HFA) 108 (90 BASE) MCG/ACT INHALER    Inhale 2 puffs into the lungs every 6 hours as needed for shortness of breath, wheezing or cough.    ASPIRIN 81 MG EC TABLET    Take 1 tablet (81 mg) by mouth 2 times daily.    FERROUS SULFATE (FE TABS) 325 (65 FE) MG EC TABLET    Take 1 tablet (325 mg) by mouth every other day.    GABAPENTIN (NEURONTIN) 300 MG CAPSULE    Take 900 mg by mouth at bedtime.    LIDOCAINE (XYLOCAINE) 5 % EXTERNAL OINTMENT    Apply topically 4 times daily.    OMEPRAZOLE (PRILOSEC OTC) 20 MG EC TABLET    Take 20 mg by mouth daily as needed.    TIZANIDINE (ZANAFLEX) 2 MG TABLET    Take 2-4 mg by mouth  "nightly as needed.   Modified Medications    No medications on file   Discontinued Medications    CELECOXIB (CELEBREX) 200 MG CAPSULE    Take 200 mg by mouth daily.    HYDROXYZINE HCL (ATARAX) 50 MG TABLET    Take 1 tablet (50 mg) by mouth every 6 hours as needed for anxiety.    OXYCODONE (ROXICODONE) 5 MG TABLET    Take 1 tablet (5 mg) by mouth every 3 hours as needed for moderate pain.       ALLERGIES:  Allergies   Allergen Reactions    Codeine Unknown     Feels like skin is crawling but can take with benadryl.    Demerol [Meperidine] Unknown     Feels like skin is crawling but can take with benadryl.    Dilaudid [Hydromorphone] Unknown     Pt \"Turns into a monster and doesn't even know who she is\"    Iodinated Contrast Media [Iodinated Contrast Media] Hives    Latex Unknown     condoms    Morphine Unknown     Feels like skin is crawling and \"I feel like a monster\". I CANNOT have morphine and Hydromorphone. Ok to take oxycodone ONLY     Percocet [Oxycodone-Acetaminophen]      Itchy, irritable    Tramadol Itching     Itching but can take with benadryl    Vicodin [Hydrocodone-Acetaminophen] Unknown     Itchy, irritable       Social History  Social History     Tobacco Use    Smoking status: Former    Smokeless tobacco: Never    Tobacco comments:     smoke pot; no cigarettes   Substance Use Topics    Alcohol use: Yes     Comment: 3-4 per week    Drug use: Not Currently     Types: Marijuana       Any Abnormal Recent Diagnostics? Yes  Blood glucose 106 on 4/28/2025: No known history of prediabetes or diabetes.  Will monitor BG's during hospital stay.  Goal BG <180.    Discharge Planning:     Home on POD 1 or POD 2 when medically stable for discharge with assist of Daughter.    02/17/25 0640    Discharge Planning   Patient/Family Anticipates Transition to home   Concerns to be Addressed all concerns addressed in this encounter   Living Arrangements   People in Home alone   Type of Residence Private Residence   Is your " private residence a single family home or apartment? Single family home   Stair Railings, Within Home, Primary railings safe and in good condition   Bathroom Shower/Tub Tub/Shower unit   Support System   Support Systems Children  (Daughter)   Do you have someone available to stay with you one or two nights once you are home? Yes       JAYLYN Esteban, CNP   Advanced Practice Nurse Navigator- Orthopedics  [facility]   Phone: [phone]

## 2025-05-08 NOTE — CONSULTS
[facility]  Consult Note - Hospitalist Service  Date of Admission:  5/8/2025  Consult Requested by: Dr. Sanches  Reason for Consult: hx renal cell cancer s/p partial nephrectomy, hx of breast cancer, reflux, milagros, CAITLYN, bipolar 1    Assessment & Plan   Rosemary Kay is a 64 year old female admitted on 5/8/2025. She has a pmhx of hx renal cell cancer s/p partial nephrectomy, hx of breast cancer s/p left lumpectomy and radiation in 2016, reflux, milagros, CAILTYN, bipolar 1, who is here for orthopedic surgery.    The preop visit with Alma and provider Carlos notes are reviewed. Preop labs include 4/28/25 na 141, k 4.2, cr 0.73, wbc 7.4, hgb 121, plts 181. Home medications reviewed.      S/p Right MISSY posterior revision w/ explant of spacer on 5/8/2025  by Dr. Sanches w/ EBL of  450cc cell saver recovery and 150cc reinfusion. Internal Medicine is consulted for medication co-management. Home medications are reconciled from our standpoint.  Seen on pacu11. Vitally stable on 2L no respiratory sx. Thank you for the consult, we will follow along.   ---  Right MISSY posterior revision w/ explant of spacer  Hx of chronic right prosthetic hip infection s/p explant and abx spacer on 11/5/2024  A/p- postop as per surgery, ID consulted on the case and pt completed iv abx course. Wean 02  -Wean 02 as able; however, if unable to wean to RA, please page provider (w/ consideration for cxr, procal, bnp to work up); use incentive spirometer   --DVT prophylaxis and pain management per primary service  -We will monitor for complications including respiratory concerns, urinary retention, ileus, skin reactions, infections, medication side effects, etc.  -Incentive spirometry   -discharge disposition per primary service  - added prn hydralazine for sbp >200 or symptomatic bp   - pain control will help too     hx renal cell cancer  hx of breast cancer  A/p-  s/p partial nephrectomy and s/p left lumpectomy and radiation in  "2016  - noted, rec outpatient follow up     Reflux  A/p- stable, continue ppi    Vicente  A/p- not on cpap, rec outpt follow up    CAITLYN  bipolar 1  A/p- stable, follows w/ psychiatry, monitor for any exacerbating symptoms       Clinically Significant Risk Factors Present on Admission                 # Drug Induced Platelet Defect: home medication list includes an antiplatelet medication             # Obesity: Estimated body mass index is 36.67 kg/m  as calculated from the following:    Height as of this encounter: 1.702 m (5' 7\").    Weight as of this encounter: 106.2 kg (234 lb 1.6 oz).       # Financial/Environmental Concerns:           Danial Wolf MD  Hospitalist Service  Securely message with [employer] (more info)  Text page via [facility] Paging/Directory   ______________________________________________________________________    Chief Complaint   Hip pain     History is obtained from the patient and her daughter at bedside     History of Present Illness   Rosemary Kay is a 64 year old female who as a pmhx of hx renal cell cancer s/p partial nephrectomy, hx of breast cancer s/p left lumpectomy and radiation in 2016, reflux, vicente, CAITLYN, bipolar 1, who is here for orthopedic surgery.    The preop visit with Alma and provider Carlos notes are reviewed. Preop labs include 4/28/25 na 141, k 4.2, cr 0.73, wbc 7.4, hgb 121, plts 181. Home medications reviewed.      S/p Right MISSY posterior revision w/ explant of spacer on 5/8/2025  by Dr. Sanches w/ EBL of  450cc cell saver recovery and 150cc reinfusion. Internal Medicine is consulted for medication co-management.    Patient's main concern was her postop pain. She just received medications. Discussed w/ bedside team and recommended to also get in touch w/ primary/surgery team. Patient has no new symptoms or concerns or other complaints.  We discussed home medications.  Discussed pain per primary team as well as DVT prophylaxis.  Patient has no fevers or chills, chest pain, " "shortness of breath, abdominal pain, neurologic or sensory changes or weakness.  We discussed monitoring for gas.    Patient is from [place]. Updated pt's family member. Discussed w/ RN and anesthesia.     Past Medical History    Past Medical History:   Diagnosis Date    Anemia     Arthritis     Gastroesophageal reflux disease     Liver disease     Obese        Past Surgical History   Past Surgical History:   Procedure Laterality Date    ARTHROPLASTY REVISION HIP Right 11/05/2024    Procedure: RIGHT TOTAL HIP ARTHROPLASTY POSTERIOR REVISION BOTH COMPONENTS;  Surgeon: Rios Sanches MD;  Location: [facility] Main OR    BIOPSY BREAST Left 09/13/2016    Procedure: Evacuation of Hematoma Left Breast;  Surgeon: Halina Pagan MD;  Location: [facility] OR;  Service:     IR PICC CHECK RIGHT  11/06/2024    JOINT REPLACEMENT Left     KIDNEY SURGERY      mass excision on the left    LUMPECTOMY BREAST Left 09/07/2016    DR. PAGAN    LUMPECTOMY BREAST Left 09/07/2016    Dr. Pagan    PICC SINGLE LUMEN PLACEMENT Right 11/06/2024       Medications   I have reviewed this patient's current medications       Review of Systems    The 10 point Review of Systems is negative other than noted in the HPI or here.      Social History   I have reviewed this patient's social history and updated it with pertinent information if needed.  Social History     Tobacco Use    Smoking status: Former    Smokeless tobacco: Never    Tobacco comments:     smoke pot; no cigarettes   Vaping Use    Vaping status: Never Used   Substance Use Topics    Alcohol use: Yes     Comment: 3-4 per week    Drug use: Not Currently     Types: Marijuana         Allergies   Allergies   Allergen Reactions    Dilaudid [Hydromorphone] Unknown     Pt \"Turns into a monster and doesn't even know who she is\"    Morphine Unknown     Feels like skin is crawling and \"I feel like a monster\". I CANNOT have morphine and Hydromorphone. Ok to take oxycodone ONLY     Codeine Unknown "     Feels like skin is crawling but can take with benadryl.    Demerol [Meperidine] Unknown     Feels like skin is crawling but can take with benadryl.    Iodinated Contrast Media [Iodinated Contrast Media] Hives    Latex Unknown     condoms    Percocet [Oxycodone-Acetaminophen]      Itchy, irritable-able to take with visiteril    Tramadol Itching     Itching but can take with benadryl    Vicodin [Hydrocodone-Acetaminophen] Unknown     Itchy, irritable        Physical Exam   Vital Signs: Temp: 97.6  F (36.4  C) Temp src: Temporal BP: 127/71 Pulse: 57   Resp: 16 SpO2: 99 % O2 Device: Nasal cannula Oxygen Delivery: 2 LPM  Weight: 234 lbs 1.6 oz      GENERAL:  Alert, appears comfortable, in no acute distress, appears stated age   HEAD:  Normocephalic, without obvious abnormality, atraumatic   EYES:  PERRL, conjunctiva/corneas clear, no scleral icterus, EOM's intact   NOSE: Nares normal, septum midline, mucosa normal, no drainage   THROAT: Lips, mucosa, and tongue normal; teeth and gums normal, mouth moist   NECK: Supple, symmetrical, trachea midline   BACK:   Symmetric, no curvature   LUNGS:   Clear to auscultation bilaterally, no rales, rhonchi, or wheezing, symmetric chest rise on inhalation, respirations unlabored, on 2L   CHEST WALL:  No tenderness or conspicuous deformity   HEART:  Regular rate and rhythm, S1 and S2 normal, no murmur, rub, or gallop, no conspicuous jugular venous distention noted, peripheral pulses intact    ABDOMEN:   Soft, non-tender, bowel sounds auscultated in all four quadrants, no masses, no organomegaly, no rebound or guarding   EXTREMITIES: Extremities normal, atraumatic, no cyanosis or edema    SKIN: Dry to touch, no exanthems in the visualized areas or erythema   NEURO: Alert, oriented x3, moves all four extremities of their own volition, strength is 5/5 in upper limbs; lower limbs not yet moving 2/2 anesthesia but sensation preserved   PSYCH:  SKIN Cooperative and behavior is  appropriate   Right hip dressing is c/d/I and surrounding skin is normal appearing         Medical Decision Making       81 MINUTES SPENT BY ME on the date of service doing chart review, history, exam, documentation & further activities per the note.      Data   ------------------------- PAST 24 HR DATA REVIEWED -----------------------------------------------    I have personally reviewed the following data over the past 24 hrs:    7.9  \   12.0   / 186     N/A N/A N/A /  90   3.8 N/A 0.75 \     Procal: N/A CRP: 7.23 (H) Lactic Acid: N/A       INR:  0.94 PTT:  N/A   D-dimer:  N/A Fibrinogen:  N/A       Imaging results reviewed over the past 24 hrs:   Recent Results (from the past 24 hours)   XR Hip Port Right 1 View    Narrative    EXAM: XR HIP PORT RIGHT 1 VIEW  LOCATION: [facility]  DATE: 5/8/2025    INDICATION: INtraop total hip  COMPARISON: 2/14/2024      Impression    IMPRESSION: Intraoperative views demonstrate revision right hip arthroplasty in progress with new acetabular cup, and reamer in the proximal right femur.   XR Pelvis Port 1/2 Views    Narrative    EXAM: XR PELVIS PORT 1/2 VIEWS  LOCATION: [facility]  DATE: 5/8/2025    INDICATION: Right MISSY revision.  COMPARISON: None.      Impression    IMPRESSION: Right total hip arthroplasty. No fracture or dislocation. No loosening. Benign cortical thickening in the proximal femoral shaft. Prior left total hip arthroplasty.

## 2025-05-08 NOTE — ANESTHESIA PREPROCEDURE EVALUATION
"Anesthesia Pre-Procedure Evaluation    Patient: Rosemary Kay   MRN: [id number] : 1960        Procedure : Procedure(s):  RIGHT TOTAL HIP ARTHROPLASTY POSTERIOR REVISION          Past Medical History:   Diagnosis Date    Anemia     Arthritis     Gastroesophageal reflux disease     Liver disease     Obese       Past Surgical History:   Procedure Laterality Date    ARTHROPLASTY REVISION HIP Right 2024    Procedure: RIGHT TOTAL HIP ARTHROPLASTY POSTERIOR REVISION BOTH COMPONENTS;  Surgeon: Rios Sanches MD;  Location: [facility] Main OR    BIOPSY BREAST Left 2016    Procedure: Evacuation of Hematoma Left Breast;  Surgeon: Halina Pagan MD;  Location: [facility] OR;  Service:     IR PICC CHECK RIGHT  2024    JOINT REPLACEMENT Left     KIDNEY SURGERY      mass excision on the left    LUMPECTOMY BREAST Left 2016    DR. PAGAN    LUMPECTOMY BREAST Left 2016    Dr. Pagan    PICC SINGLE LUMEN PLACEMENT Right 2024      Allergies   Allergen Reactions    Codeine Unknown     Feels like skin is crawling but can take with benadryl.    Demerol [Meperidine] Unknown     Feels like skin is crawling but can take with benadryl.    Dilaudid [Hydromorphone] Unknown     Pt \"Turns into a monster and doesn't even know who she is\"    Iodinated Contrast Media [Iodinated Contrast Media] Hives    Latex Unknown     condoms    Morphine Unknown     Feels like skin is crawling and \"I feel like a monster\". I CANNOT have morphine and Hydromorphone. Ok to take oxycodone ONLY     Percocet [Oxycodone-Acetaminophen]      Itchy, irritable    Tramadol Itching     Itching but can take with benadryl    Vicodin [Hydrocodone-Acetaminophen] Unknown     Itchy, irritable      Social History     Tobacco Use    Smoking status: Former    Smokeless tobacco: Never    Tobacco comments:     smoke pot; no cigarettes   Substance Use Topics    Alcohol use: Yes     Comment: 3-4 per week      Wt Readings from Last 1 " Encounters:   05/08/25 106.2 kg (234 lb 1.6 oz)        Anesthesia Evaluation   Pt has had prior anesthetic.         ROS/MED HX  ENT/Pulmonary:     (+) sleep apnea, doesn't use CPAP,                                      Neurologic:  - neg neurologic ROS     Cardiovascular:  - neg cardiovascular ROS     METS/Exercise Tolerance:     Hematologic: Comments: Plt 186, Hgb 12 - neg hematologic  ROS     Musculoskeletal:   (+)  arthritis,             GI/Hepatic:     (+) GERD,            liver disease,       Renal/Genitourinary:  - neg Renal ROS     Endo:     (+)               Obesity,       Psychiatric/Substance Use:     (+) psychiatric history bipolar       Infectious Disease:  - neg infectious disease ROS     Malignancy:  - neg malignancy ROS     Other:  - neg other ROS          Physical Exam    Airway  airway exam normal      Mallampati: II   TM distance: > 3 FB   Neck ROM: full   Mouth opening: > 3 cm    Respiratory Devices and Support         Dental  no notable dental history     (+) Modest Abnormalities - crowns, retainers, 1 or 2 missing teeth      Cardiovascular   cardiovascular exam normal       Rhythm and rate: regular and normal     Pulmonary   pulmonary exam normal        breath sounds clear to auscultation         OUTSIDE LABS:  CBC:   Lab Results   Component Value Date    WBC 7.9 05/08/2025    WBC 7.4 04/28/2025    HGB 12.0 05/08/2025    HGB 12.1 04/28/2025    HCT 36.2 05/08/2025    HCT 39.4 04/28/2025     05/08/2025     04/28/2025     BMP:   Lab Results   Component Value Date     04/28/2025     02/12/2025    POTASSIUM 4.2 04/28/2025    POTASSIUM 4.1 02/12/2025    CHLORIDE 104 04/28/2025    CHLORIDE 104 02/12/2025    CO2 24 04/28/2025    CO2 24 02/12/2025    BUN 11.9 04/28/2025    BUN 12.2 02/12/2025    CR 0.73 04/28/2025    CR 0.70 02/12/2025     (H) 04/28/2025     (H) 02/12/2025     COAGS:   Lab Results   Component Value Date    INR 1.03 11/05/2024     POC: No results  "found for: \"BGM\", \"HCG\", \"HCGS\"  HEPATIC:   Lab Results   Component Value Date    ALBUMIN 4.0 12/09/2024    PROTTOTAL 6.7 12/09/2024    ALT 21 12/09/2024    AST 29 12/09/2024    ALKPHOS 139 12/09/2024    BILITOTAL 0.2 12/09/2024     OTHER:   Lab Results   Component Value Date    A1C 6.2 (H) 08/30/2024    RAMON 9.4 04/28/2025    SED 39 (H) 02/12/2025       Anesthesia Plan    ASA Status:  3    NPO Status:  NPO Appropriate                  Consents    Anesthesia Plan(s) and associated risks, benefits, and realistic alternatives discussed. Questions answered and patient/representative(s) expressed understanding.     - Discussed:     - Discussed with:  Patient            Postoperative Care       PONV prophylaxis: Ondansetron (or other 5HT-3), Dexamethasone or Solumedrol     Comments:               Miguel Gonzalez MD    I have reviewed the pertinent notes and labs in the chart from the past 30 days and (re)examined the patient.  Any updates or changes from those notes are reflected in this note.             # Drug Induced Platelet Defect: home medication list includes an antiplatelet medication             # Obesity: Estimated body mass index is 36.67 kg/m  as calculated from the following:    Height as of this encounter: 1.702 m (5' 7\").    Weight as of this encounter: 106.2 kg (234 lb 1.6 oz).       # Financial/Environmental Concerns:          Physical Exam  Airway  Mallampati: II  TM distance: > 3 FB  Neck ROM: full    Cardiovascular - normal exam  Rhythm: regular  Rate: normal     Dental - normal exam  (+) Modest Abnormalities - crowns, retainers, 1 or 2 missing teeth      Pulmonary - normal examBreath sounds clear to auscultation        Neurological   Other Findings     Anesthesia Plan    ASA Status:  3               Consents    Anesthesia Plan(s) and associated risks, benefits, and realistic alternatives discussed. Questions answered and patient/representative(s) expressed understanding.     - Discussed:     - " Discussed with:  Patient            Postoperative Care            Comments:

## 2025-05-08 NOTE — OR NURSING
Notified hospitalist regarding hypertension. PRN meds ordered. Parameters not met to give. Pain is controlled. Floor RN updated. Sending to ortho.  Quyen Howard RN

## 2025-05-08 NOTE — H&P
Orthopedic surgery preop note    64-year-old female well-known to our service status post primary right MISSY 2015.  Seen in follow-up after a long hiatus last year with severe pain and findings consistent with loosening of her femoral component.  Inflammatory markers were elevated but all cultures have been negative.  Since, she has been treated as a culture-negative prosthetic joint infection.  Patient is status post definitive explantation with radical debridement of the hip 11/5/2024, and has retained her antibiotic spacer.  Synovasure was negative/normal with negative culture and negative alpha defensin.  She now returns to the operating room for definitive second stage procedure for reimplantation.    She has no groin or anterior thigh pain.  She has buttock pain presumably result of a low back condition.  Her leg lengths appear to be equal.    Lengthy discussion and review of the intended procedure.  As before, risks were thoroughly reviewed along with the review of the routine perioperative course and postacute care.  Questions were addressed.    ELIEZER CHU

## 2025-05-08 NOTE — PROVIDER NOTIFICATION
Patient arrived to nursing room in severe right hip pain, very agitated and crying. Called to [facility] to see if Pain Service provider is able to evaluate at bedside.

## 2025-05-08 NOTE — ANESTHESIA CARE TRANSFER NOTE
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    Anesthesia Type:   Spinal     Note:    Oropharynx: oropharynx clear of all foreign objects and spontaneously breathing  Level of Consciousness: awake  Oxygen Supplementation: face mask  Level of Supplemental Oxygen (L/min / FiO2): 10  Independent Airway: airway patency satisfactory and stable  Dentition: dentition unchanged  Vital Signs Stable: post-procedure vital signs reviewed and stable  Report to RN Given: handoff report given  Patient transferred to: PACU    Handoff Report: Identifed the Patient, Identified the Reponsible Provider, Reviewed the pertinent medical history, Discussed the surgical course, Reviewed Intra-OP anesthesia mangement and issues during anesthesia, Set expectations for post-procedure period and Allowed opportunity for questions and acknowledgement of understanding      Vitals:  Vitals Value Taken Time   /85 05/08/25 11:40   Temp 36.4  C (97.6  F) 05/08/25 11:38   Pulse 60 05/08/25 11:42   Resp 17 05/08/25 11:42   SpO2 100 % 05/08/25 11:42   Vitals shown include unfiled device data.    Electronically Signed By: JAYLYN Madden CRNA  May 8, 2025  11:43 AM

## 2025-05-08 NOTE — PROGRESS NOTES
05/08/25 3035   Appointment Info   Signing Clinician's Name / Credentials (PT) Dao Mathis, OTF   Quick Adds   Quick Adds Certification   Living Environment   People in Home child(roshan), adult   Current Living Arrangements house   Home Accessibility stairs to enter home;stairs within home   Number of Stairs, Main Entrance 2   Stair Railings, Main Entrance railings safe and in good condition   Number of Stairs, Within Home, Primary none   Transportation Anticipated family or friend will provide   Self-Care   Usual Activity Tolerance good   Current Activity Tolerance moderate   Equipment Currently Used at Home walker, rolling   Fall history within last six months no   Activity/Exercise/Self-Care Comment Indep prior for all ADL's most I ADL's   General Information   Onset of Illness/Injury or Date of Surgery 05/08/25   Referring Physician Dr. Sanches   Patient/Family Therapy Goals Statement (PT) Return to home   Pertinent History of Current Problem (include personal factors and/or comorbidities that impact the POC) s/p R MISSY  64 year old female admitted on 5/8/2025. She has a pmhx of hx renal cell cancer s/p partial nephrectomy, hx of breast cancer s/p left lumpectomy and radiation in 2016, reflux, milagros, CAITLYN, bipolar 1, who is here for orthopedic surgery.   Existing Precautions/Restrictions no hip IR;no hip ER;no active hip ABD;no hip ADD past midline;no hip hyperextension;90 degree hip flexion;no pivoting or twisting   Weight-Bearing Status - RLE weight-bearing as tolerated   Cognition   Affect/Mental Status (Cognition) WFL   Range of Motion (ROM)   ROM Comment decreased ROM s/p R MISSY   Strength (Manual Muscle Testing)   Strength (Manual Muscle Testing) No deficits observed during functional mobility   Bed Mobility   Bed Mobility supine-sit;sit-supine   Supine-Sit Staples (Bed Mobility) modified independence   Transfers   Transfers sit-stand transfer   Sit-Stand Transfer   Sit-Stand Staples (Transfers)  contact guard;verbal cues   Assistive Device (Sit-Stand Transfers) walker, front-wheeled   Gait/Stairs (Locomotion)   Leeds Level (Gait) modified independence   Assistive Device (Gait) walker, front-wheeled   Distance in Feet (Gait) 160   Pattern (Gait) swing-through   Deviations/Abnormal Patterns (Gait) arleth decreased;gait speed decreased   Maintains Weight-bearing Status (Gait) able to maintain   Negotiation (Stairs) number of steps;stairs independence;stairs assistive device;handrail location;ascending technique;descending technique;maintains weight-bearing status   Leeds Level (Stairs) modified independence   Handrail Location (Stairs) both sides   Number of Steps (Stairs) 4   Ascending Technique (Stairs) step-to-step   Descending Technique (Stairs) step-to-step   Comment, (Gait/Stairs) patient gait stable, prefers to step down with L LE instead of R, reviewed precautions and HEP and given handout   Balance   Balance no deficits were identified   Clinical Impression   Criteria for Skilled Therapeutic Intervention Evaluation only   Clinical Impression Comments Paitent very confident with mobilitly and precautions and HEP.  No further skilled PT needs   PT Total Evaluation Time   PT Eval, Low Complexity Minutes (95339) 20   Therapy Certification   Start of care date 05/08/25   Certification date from 05/08/25   Certification date to 05/08/25   Interventions   Interventions Quick Adds Therapeutic Procedure;Therapeutic Activity;Gait Training   PT Discharge Planning   PT Plan dc PT   PT Discharge Recommendation (DC Rec) other (see comments)  (per ortho MD)   PT Rationale for DC Rec Paitent moving well overall and has good home setup and support   PT Brief overview of current status Patient independent with gait/transfers   PT Total Distance Amb During Session (feet) 160   PT Equipment Needed at Discharge cane, straight;walker, rolling   Physical Therapy Time and Intention   Total Session Time (sum of  timed and untimed services) 20

## 2025-05-08 NOTE — ANESTHESIA PROCEDURE NOTES
"Intrathecal Procedure Note    Pre-Procedure   Staff -        Anesthesiologist:  Miguel Gonzalez MD       Performed By: anesthesiologist       Location: OR       Procedure Start/Stop Times: 5/8/2025 7:54 AM and 5/8/2025 7:57 AM       Pre-Anesthestic Checklist: patient identified, IV checked, risks and benefits discussed, informed consent, monitors and equipment checked, pre-op evaluation and at physician/surgeon's request  Timeout:       Correct Patient: Yes        Correct Procedure: Yes        Correct Site: Yes        Correct Position: Yes   Procedure Documentation  Procedure: intrathecal         Patient Position: sitting       Patient Prep/Sterile Barriers: sterile gloves, mask, patient draped       Skin prep: Chloraprep       Insertion Site: L3-4. (midline approach).       Needle Gauge: 25.        Needle Length (Inches): 3.5        Spinal Needle Type: Sarah tip       Introducer used       Introducer: 20 G       # of attempts: 1 and  # of redirects:     Assessment/Narrative         Paresthesias: No.       CSF fluid: clear.    Medication(s) Administered   0.5% Bupivacaine PF (Intrathecal) - Intrathecal   3 mL - 5/8/2025 7:57:00 AM  Medication Administration Time: 5/8/2025 7:54 AM      FOR [facility] [place]:   Pain Team Contact information: please page the Pain Team Via [employer]. Search \"Pain\". During daytime hours, please page the attending first. At night please page the resident first.      "

## 2025-05-08 NOTE — PROGRESS NOTES
Occupational Therapy: Orders received. Chart reviewed and discussed with care team.? Occupational Therapy not indicated due to pt having multiple previous joint surgeries, has good home setup/support, and is Mod I w/ ADLs.? Defer discharge recommendations to PT and ortho team.? Will complete orders.

## 2025-05-09 ENCOUNTER — APPOINTMENT (OUTPATIENT)
Dept: RADIOLOGY | Facility: CLINIC | Age: 65
DRG: 467 | End: 2025-05-09
Payer: COMMERCIAL

## 2025-05-09 VITALS
RESPIRATION RATE: 17 BRPM | HEART RATE: 70 BPM | WEIGHT: 234.1 LBS | SYSTOLIC BLOOD PRESSURE: 152 MMHG | DIASTOLIC BLOOD PRESSURE: 79 MMHG | HEIGHT: 67 IN | BODY MASS INDEX: 36.74 KG/M2 | OXYGEN SATURATION: 95 % | TEMPERATURE: 96.5 F

## 2025-05-09 LAB
CREAT SERPL-MCNC: 0.81 MG/DL (ref 0.51–0.95)
EGFRCR SERPLBLD CKD-EPI 2021: 80 ML/MIN/1.73M2
GLUCOSE SERPL-MCNC: 163 MG/DL (ref 70–99)
HGB BLD-MCNC: 9.9 G/DL (ref 11.7–15.7)
HGB BLD-MCNC: 9.9 G/DL (ref 11.7–15.7)

## 2025-05-09 PROCEDURE — 250N000011 HC RX IP 250 OP 636

## 2025-05-09 PROCEDURE — 99232 SBSQ HOSP IP/OBS MODERATE 35: CPT | Performed by: STUDENT IN AN ORGANIZED HEALTH CARE EDUCATION/TRAINING PROGRAM

## 2025-05-09 PROCEDURE — 85018 HEMOGLOBIN: CPT

## 2025-05-09 PROCEDURE — 36415 COLL VENOUS BLD VENIPUNCTURE: CPT

## 2025-05-09 PROCEDURE — 250N000011 HC RX IP 250 OP 636: Mod: JZ | Performed by: ORTHOPAEDIC SURGERY

## 2025-05-09 PROCEDURE — 82565 ASSAY OF CREATININE: CPT

## 2025-05-09 PROCEDURE — 82947 ASSAY GLUCOSE BLOOD QUANT: CPT | Performed by: ORTHOPAEDIC SURGERY

## 2025-05-09 PROCEDURE — 73562 X-RAY EXAM OF KNEE 3: CPT | Mod: LT

## 2025-05-09 PROCEDURE — 85018 HEMOGLOBIN: CPT | Performed by: STUDENT IN AN ORGANIZED HEALTH CARE EDUCATION/TRAINING PROGRAM

## 2025-05-09 PROCEDURE — 250N000013 HC RX MED GY IP 250 OP 250 PS 637

## 2025-05-09 PROCEDURE — 250N000013 HC RX MED GY IP 250 OP 250 PS 637: Performed by: ORTHOPAEDIC SURGERY

## 2025-05-09 PROCEDURE — 250N000009 HC RX 250

## 2025-05-09 PROCEDURE — 36415 COLL VENOUS BLD VENIPUNCTURE: CPT | Performed by: STUDENT IN AN ORGANIZED HEALTH CARE EDUCATION/TRAINING PROGRAM

## 2025-05-09 RX ORDER — CEFADROXIL 500 MG/1
500 CAPSULE ORAL 2 TIMES DAILY
Qty: 14 CAPSULE | Refills: 0 | Status: SHIPPED | OUTPATIENT
Start: 2025-05-09 | End: 2025-05-16

## 2025-05-09 RX ORDER — LIDOCAINE HYDROCHLORIDE 10 MG/ML
5 INJECTION, SOLUTION EPIDURAL; INFILTRATION; INTRACAUDAL; PERINEURAL ONCE
Status: COMPLETED | OUTPATIENT
Start: 2025-05-09 | End: 2025-05-09

## 2025-05-09 RX ORDER — METHOCARBAMOL 500 MG/1
250 TABLET, FILM COATED ORAL 4 TIMES DAILY PRN
Qty: 20 TABLET | Refills: 0 | Status: SHIPPED | OUTPATIENT
Start: 2025-05-09

## 2025-05-09 RX ORDER — OXYCODONE HYDROCHLORIDE 5 MG/1
5 TABLET ORAL
Qty: 26 TABLET | Refills: 0 | Status: SHIPPED | OUTPATIENT
Start: 2025-05-09

## 2025-05-09 RX ORDER — TRIAMCINOLONE ACETONIDE 40 MG/ML
80 INJECTION, SUSPENSION INTRA-ARTICULAR; INTRAMUSCULAR ONCE
Status: COMPLETED | OUTPATIENT
Start: 2025-05-09 | End: 2025-05-09

## 2025-05-09 RX ADMIN — OXYCODONE 10 MG: 5 TABLET ORAL at 00:34

## 2025-05-09 RX ADMIN — METHOCARBAMOL 250 MG: 500 TABLET ORAL at 08:46

## 2025-05-09 RX ADMIN — SENNOSIDES AND DOCUSATE SODIUM 1 TABLET: 50; 8.6 TABLET ORAL at 08:46

## 2025-05-09 RX ADMIN — KETOROLAC TROMETHAMINE 15 MG: 15 INJECTION, SOLUTION INTRAMUSCULAR; INTRAVENOUS at 01:06

## 2025-05-09 RX ADMIN — HYDROXYZINE HYDROCHLORIDE 10 MG: 10 TABLET, FILM COATED ORAL at 00:36

## 2025-05-09 RX ADMIN — CEFAZOLIN SODIUM 2 G: 2 SOLUTION INTRAVENOUS at 06:35

## 2025-05-09 RX ADMIN — OXYCODONE 10 MG: 5 TABLET ORAL at 11:32

## 2025-05-09 RX ADMIN — LIDOCAINE HYDROCHLORIDE 5 ML: 10 INJECTION, SOLUTION EPIDURAL; INFILTRATION; INTRACAUDAL; PERINEURAL at 11:18

## 2025-05-09 RX ADMIN — KETOROLAC TROMETHAMINE 15 MG: 15 INJECTION, SOLUTION INTRAMUSCULAR; INTRAVENOUS at 06:37

## 2025-05-09 RX ADMIN — ACETAMINOPHEN 975 MG: 325 TABLET, FILM COATED ORAL at 08:46

## 2025-05-09 RX ADMIN — HYDROXYZINE HYDROCHLORIDE 10 MG: 10 TABLET, FILM COATED ORAL at 06:48

## 2025-05-09 RX ADMIN — TRIAMCINOLONE ACETONIDE 80 MG: 40 INJECTION, SUSPENSION INTRA-ARTICULAR; INTRAMUSCULAR at 11:18

## 2025-05-09 RX ADMIN — ASPIRIN 81 MG: 81 TABLET, COATED ORAL at 08:46

## 2025-05-09 RX ADMIN — OXYCODONE 10 MG: 5 TABLET ORAL at 08:45

## 2025-05-09 RX ADMIN — OXYCODONE 10 MG: 5 TABLET ORAL at 05:21

## 2025-05-09 RX ADMIN — ACETAMINOPHEN 975 MG: 325 TABLET, FILM COATED ORAL at 01:06

## 2025-05-09 RX ADMIN — POLYETHYLENE GLYCOL 3350 17 G: 17 POWDER, FOR SOLUTION ORAL at 08:45

## 2025-05-09 ASSESSMENT — ACTIVITIES OF DAILY LIVING (ADL)
ADLS_ACUITY_SCORE: 38
ADLS_ACUITY_SCORE: 49
ADLS_ACUITY_SCORE: 38
ADLS_ACUITY_SCORE: 49
ADLS_ACUITY_SCORE: 49
ADLS_ACUITY_SCORE: 38
ADLS_ACUITY_SCORE: 49

## 2025-05-09 NOTE — PLAN OF CARE
Problem: Hip Arthroplasty  Goal: Optimal Pain Control and Function  Outcome: Progressing     Problem: Hip Arthroplasty  Goal: Effective Urinary Elimination  5/8/2025 2337 by Heri Chester, RN  Outcome: Progressing  5/8/2025 2337 by Heri Chester RN  Outcome: Progressing    Patient alert and oriented. VSS.  2L of oxygen via nasal Cannula.  LR infusing at 100ml/hr. Tolerating Regular diet. Voiding spontaneously. A1 with walker and gaitbelt. PRN oxycodone given for pain, along with ice packs and repositioning.  Pillow between legs. Call light within reach. Alarms on.

## 2025-05-09 NOTE — DISCHARGE SUMMARY
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 Sanches on . There were no intraoperative complications and the patient was transferred to the recovery room and later the orthopedic unit in stable condition. Once the patient reached the orthopedic floor our orthopedic pain protocol was implemented along with the following:    Anticoagulation Medications: ASA  Therapy: PT and OT  Activity: WBAT  Bracing: None    Consultations during Admission: Hospitalist service for medical management     COMPLICATIONS/SIGNIFICANT FINDINGS    NONEO    DISCHARGE INFORMATION   Condition at discharge: Good  Discharge destination: Home  Patient was seen by myself on the date of discharge.    FOLLOW UP CARE   Follow up with orthopedics in 2 weeks or sooner should the need arise. Ortho will continue to manage pain control, post op anticoagulation and incision care.     Follow up with your PCP for management of chronic medical problems and to evaluate for post op medical complications including constipation, nausea/vomiting, DVT/PE, anemia, changes in blood pressure, fevers/chills, urinary retention and atelectasis/pneumonia.     Subjective   Patient is doing well on POD #1. Pain is well controlled with oral medications. Ambulating. Tolerating oral intake.     Physical Exam   BP (!) 152/79 (BP Location: Right arm)   Pulse 70   Temp (!) 96.5  F (35.8  C) (Axillary)   Resp 17   Ht 1.702 m (5' 7\")   " Wt 106.2 kg (234 lb 1.6 oz)   SpO2 95%   BMI 36.67 kg/m    Dressing clean and dry.  No calf pain,  Negative Homans' sign.  Quad strength 4 -/5+  Ankle dorsiflexion strength 3+/5+  Sensory exam intact.    Pertinent Results at Discharge     Hemoglobin   Date/Time Value Ref Range Status   05/09/2025 09:57 AM 9.9 (L) 11.7 - 15.7 g/dL Final   05/09/2025 07:36 AM 9.9 (L) 11.7 - 15.7 g/dL Final   05/08/2025 06:33 AM 12.0 11.7 - 15.7 g/dL Final     INR   Date/Time Value Ref Range Status   05/08/2025 06:33 AM 0.94 0.85 - 1.15 Final   11/05/2024 06:23 AM 1.03 0.85 - 1.15 Final     Platelet Count   Date/Time Value Ref Range Status   05/08/2025 06:33  150 - 450 10e3/uL Final   04/28/2025 10:13  150 - 450 10e3/uL Final   02/12/2025 09:42  150 - 450 10e3/uL Final       Problem List   Active Problems:    S/P revision of total hip      Trina Allen PA-C/Dr. Sanches  [facility]  [phone]  Date: 5/9/2025  Time: 10:35 AM

## 2025-05-09 NOTE — CONSULTS
Care Management Discharge Note    Discharge Date: 05/10/2025       Discharge Disposition:  home with family      Additional Information:  9:01 AM  Chart reviewed.  Pt will discharge home with family assistance.  Anticipate family transport.    VOLODYMYR Villatoro

## 2025-05-09 NOTE — PLAN OF CARE
Patient discharged home with daughter. All questions answered. AVS gone over with patient and daughter.   Problem: Delirium  Goal: Improved Behavioral Control  Intervention: Minimize Safety Risk  Recent Flowsheet Documentation  Taken 5/9/2025 0845 by Olena Salazar, RN  Enhanced Safety Measures: room near unit station     Problem: Adult Inpatient Plan of Care  Goal: Absence of Hospital-Acquired Illness or Injury  Intervention: Identify and Manage Fall Risk  Recent Flowsheet Documentation  Taken 5/9/2025 0845 by Olena Salazar, RN  Safety Promotion/Fall Prevention:   activity supervised   assistive device/personal items within reach   clutter free environment maintained   nonskid shoes/slippers when out of bed   patient and family education   room near nurse's station   room organization consistent   safety round/check completed  Intervention: Prevent Skin Injury  Recent Flowsheet Documentation  Taken 5/9/2025 0845 by Olena Salazar, RN  Body Position: position changed independently  Intervention: Prevent Infection  Recent Flowsheet Documentation  Taken 5/9/2025 0845 by Olena Salazar, RN  Infection Prevention:   hand hygiene promoted   rest/sleep promoted   single patient room provided  Goal: Readiness for Transition of Care  Intervention: Mutually Develop Transition Plan  Recent Flowsheet Documentation  Taken 5/9/2025 0900 by Olena Salazar, RN  Equipment Currently Used at Home: walker, rolling   Goal Outcome Evaluation:

## 2025-05-09 NOTE — PROGRESS NOTES
POD #1    Status post complex revision with reimplantation left MISSY after culture-negative prosthetic joint infection.  Postop course unremarkable.  Good pain relief.  Patient fully apprised of the course of surgery and the findings in the end result.  X-rays reviewed with patient yesterday.    Vital stable.  Afebrile.  Labs pending  Dressing clean and dry.  No calf pain,  Negative Homans' sign.  Quad strength 4 -/5+  Ankle dorsiflexion strength 3+/5+  Sensory exam intact.    Postop x-ray:  AP pelvis and PAR was reviewed.  We did lengthen her to make up for preop leg length discrepancy and I did lengthen slightly to accommodate stability.    Assessment:  1.complex revision with reimplantation for culture-negative prosthetic joint infection    Plan:  1.patient fully apprised of the findings including the intended leg lengthening.  We will accommodate this with a shoe lift if necessary.  Patient understands and feels comfortable with this.  Lengthy discussion.  2.DVT prophylaxis.  Aspirin twice daily.  3.postop MISSY posterior approach restrictions fully reviewed.  4.return to clinic 10 days.  5.offered to provide steroid injection left knee for suppose it pain.  X-rays in the office were reviewed yesterday and findings discussed.  Will review this issue upon postop return.  6.I reviewed all findings with her and she felt comfortable questions addressed.

## 2025-05-09 NOTE — PLAN OF CARE
Patient vital signs are at baseline: No,  Reason:  Required 2L O2 overnight when sleeping. Possibly due to JOHNNIE history, she does not wear a CPAP.   Patient able to ambulate as they were prior to admission or with assist devices provided by therapies during their stay:  Yes Assistx1 WGB/Standy.   Patient MUST void prior to discharge:  Yes  Patient able to tolerate oral intake:  Yes  Pain has adequate pain control using Oral analgesics:  Yes  Does patient have an identified :  Yes  Has goal D/C date and time been discussed with patient:  Yes    VSS  on 2L NC. A&Ox4. Patient accidentally pulled out right hand IV during the night. Bed sheets and gown changed. Left hand IV remains intact and was reinforced. Patient walks very well and is a minimal assist of 1 with WGB. Expresses desire to go home. Call light within reach and is able to make needs known.       Problem: Delirium  Goal: Improved Attention and Thought Clarity  5/9/2025 0344 by Reynold Louis, RN  Outcome: Progressing  5/9/2025 0341 by Reynold Louis RN  Outcome: Progressing    Problem: Adult Inpatient Plan of Care  Goal: Absence of Hospital-Acquired Illness or Injury  Intervention: Identify and Manage Fall Risk

## 2025-05-09 NOTE — PROGRESS NOTES
[facility]    Medicine Progress Note - Hospitalist Service    Date of Admission:  5/8/2025    Assessment & Plan   Rosemary Kay is a 64 year old female admitted on 5/8/2025. She has a pmhx of hx renal cell cancer s/p partial nephrectomy, hx of breast cancer s/p left lumpectomy and radiation in 2016, reflux, milagros, CAITLYN, bipolar 1, who is here for orthopedic surgery.    The preop visit with Alma and provider Carlos notes are reviewed. Preop labs include 4/28/25 na 141, k 4.2, cr 0.73, wbc 7.4, hgb 121, plts 181. Home medications reviewed.      S/p Right MISSY posterior revision w/ explant of spacer on 5/8/2025  by Dr. Sanches w/ EBL of  450cc cell saver recovery and 150cc reinfusion. Internal Medicine is consulted for medication co-management. Home medications are reconciled from our standpoint.  Seen on pacu11. Vitally stable on 2L no respiratory sx.     On 5/9/2025, back on RA. Recheck hgb given meeting (just borderline) acute blood loss anemia delta -2.1. Pt remains hemodynamically and vitally stable and cleared from the internal medicine perspective, the final disposition is pending therapies clearance and per surgery/primary team. Medications are reconciled for discharge from our standpoint.    ---  Right MISSY posterior revision w/ explant of spacer  Hx of chronic right prosthetic hip infection s/p explant and abx spacer on 11/5/2024  - acute blood loss anemia  A/p- postop as per surgery, ID consulted on the case and pt completed iv abx course.   -Recheck hgb given meeting (just barely) acute blood loss anemia delta  -DVT prophylaxis and pain management per primary service  -We will monitor for complications including respiratory concerns, urinary retention, ileus, skin reactions, infections, medication side effects, etc.  -Incentive spirometry   -discharge disposition per primary service  - added prn hydralazine for sbp >200 or symptomatic bp   - pain control will help too     hx renal  "cell cancer  hx of breast cancer  A/p-  s/p partial nephrectomy and s/p left lumpectomy and radiation in 2016  - noted, rec outpatient follow up     Reflux  A/p- stable, continue ppi    Vicente  A/p- not on cpap, rec outpt follow up    CAITLYN  bipolar 1  A/p- stable, follows w/ psychiatry, monitor for any exacerbating symptoms          Diet: Discharge Instruction - Regular Diet Adult  Regular Diet Adult    DVT Prophylaxis: Defer to primary service  Butler Catheter: Not present  Lines: None     Cardiac Monitoring: None  Code Status: Full Code      Clinically Significant Risk Factors                              # Obesity: Estimated body mass index is 36.67 kg/m  as calculated from the following:    Height as of this encounter: 1.702 m (5' 7\").    Weight as of this encounter: 106.2 kg (234 lb 1.6 oz)., PRESENT ON ADMISSION     # Financial/Environmental Concerns:           Social Drivers of Health    Housing Stability: Low Risk  (11/8/2024)    Housing Stability     Do you have housing? : Yes     Are you worried about losing your housing?: No   Recent Concern: Housing Stability - High Risk (11/8/2024)    Housing Stability     Do you have housing? : No     Are you worried about losing your housing?: No   Tobacco Use: Medium Risk (5/8/2025)    Patient History     Smoking Tobacco Use: Former     Smokeless Tobacco Use: Never          Disposition Plan     Medically Ready for Discharge: Anticipated Today             Danial Wolf MD  Hospitalist Service  [facility]  Securely message with [employer] (more info)  Text page via [email or web address] Paging/Directory   ______________________________________________________________________    Interval History   -no acute interval events  -seen in early morning, no new symptoms or shortness of breath.  -We discussed disposition per primary team  -passing gas   -All questions answered      Physical Exam   Vital Signs: Temp: (!) 96.5  F (35.8  C) Temp src: Axillary BP: (!) 152/79 " Pulse: 70   Resp: 17 SpO2: 95 % O2 Device: None (Room air) Oxygen Delivery: 2 LPM  Weight: 234 lbs 1.6 oz    General: alert, oriented, and in no acute distress  Pulmonary: clear to auscultation bilaterally, normal respiratory effort, on room air, no rales or wheezes or evidence of accessory muscle use  CVS: regular rate and rhythm, no murmurs, rubs, or gallops; no blatant jugular venous distention; no extremity edema and extremities are warm to the touch  GI: soft, nontender, BS+, no rebound or guarding, no conspicuous organomegaly   Neuro: nonfocal, moves all extremities of own volition  Psych: appropriate   Msk- stable     Medical Decision Making       40 MINUTES SPENT BY ME on the date of service doing chart review, history, exam, documentation & further activities per the note.      Data   ------------------------- PAST 24 HR DATA REVIEWED -----------------------------------------------    I have personally reviewed the following data over the past 24 hrs:    N/A  \   9.9 (L)   / N/A     N/A N/A N/A /  163 (H)   N/A N/A 0.81 \       Imaging results reviewed over the past 24 hrs:   Recent Results (from the past 24 hours)   XR Knee Left 3 Views    Narrative    EXAM: XR KNEE LEFT 3 VIEWS  LOCATION: [facility]  DATE: 5/9/2025    INDICATION: left knee pain  COMPARISON: 01/06/2025      Impression    IMPRESSION: No acute displaced fracture. Moderate medial compartment predominant knee arthrosis. Calcified loose bodies are present posteriorly. No significant knee joint effusion. Quadriceps enthesophyte.

## 2025-05-13 LAB — BACTERIA SNV CULT: NO GROWTH

## 2025-05-15 LAB — BACTERIA SNV CULT: NORMAL

## 2025-05-22 LAB — BACTERIA SNV CULT: NORMAL

## (undated) DEVICE — CELL SAVER

## (undated) DEVICE — SUCTION IRR SYSTEM W/O TIP INTERPULSE HANDPIECE 0210-100-000

## (undated) DEVICE — PREP POVIDONE-IODINE 7.5% SCRUB 4OZ BOTTLE MDS093945

## (undated) DEVICE — GLOVE BIOGEL PI INDICATOR 8.0 LF 41680

## (undated) DEVICE — BLADE SAGITTAL WIDE (SO-618) 2108-118

## (undated) DEVICE — GOWN IMPERVIOUS BREATHABLE 2XL/XLONG

## (undated) DEVICE — DRAPE STERI TOWEL LG 1010

## (undated) DEVICE — SYR 05ML LL W/O NDL

## (undated) DEVICE — SOL ISOPROPYL RUBBING ALCOHOL USP 70% 4OZ HDX-20 I0020

## (undated) DEVICE — NEEDLE HYPO 21GA X 1-1/2 SAFETY 305917

## (undated) DEVICE — SOL WATER IRRIG 1000ML BOTTLE 2F7114

## (undated) DEVICE — SU ETHIBOND 5 V-37 4X30" MB66G

## (undated) DEVICE — HOOD SURG T7PLUS PEEL AWAY FACE SHIELD STRL LF 0416-801-100

## (undated) DEVICE — SUCTION ANTICOAGULATION ASSEMBLY BT725

## (undated) DEVICE — CLEANSER JET LAVAGE IRR 0.05% CHG ISEPT-450-USA

## (undated) DEVICE — GLOVE BIOGEL INDICATOR 7.5 LF 41675

## (undated) DEVICE — BONE CLEANING TIP INTERPULSE  0210-010-000

## (undated) DEVICE — STPL SKIN 35W 6.9MM  PXW35

## (undated) DEVICE — GLOVE BIOGEL PI SZ 8.0 40880

## (undated) DEVICE — RETRIVER SUTURE LASSO HOFFEE BLUE 710000

## (undated) DEVICE — SU STRATAFIX PDS PLUS 1 CT-1 18" SXPP1A404

## (undated) DEVICE — SUTURE VICRYL+ 0 27IN CT-1 UND VCP260H

## (undated) DEVICE — POSITIONER ABDUCTION PILLOW FOAM MED FP-ABDUCTM

## (undated) DEVICE — CAP SYRINGE TIP STERILE 305819

## (undated) DEVICE — BONE CLEANING TIP INTERPULSE FEMORAL CANAL 0210-007-000

## (undated) DEVICE — DRAPE IOBAN INCISE 36X23" 6651EZ

## (undated) DEVICE — SOL NACL 0.9% IRRIG 1000ML BOTTLE 2F7124

## (undated) DEVICE — ELECTRODE PATIENT RETURN ADULT L10 FT 2 PLATE CORD 0855C

## (undated) DEVICE — TRAY PREP DRY SKIN SCRUB 067

## (undated) DEVICE — GLOVE BIOGEL PI SZ 7.5 40875

## (undated) DEVICE — SU ETHIBOND 1 CT-1 30" X425H

## (undated) DEVICE — SU VICRYL+ 0 27IN CT-1 UND VCP260H

## (undated) DEVICE — PREP CHLORAPREP 26ML TINTED HI-LITE ORANGE 930815

## (undated) DEVICE — CLEANSER JET LAVAGE IRRISEPT 0.05% CHG IRRISEPT45USA

## (undated) DEVICE — SUCTION TIP FLEXI CLEAR TIP DISP K62

## (undated) DEVICE — HOOD SURG T7 PLUS STRL LF DISP 0416-801-200

## (undated) DEVICE — TUBING SUCTION MEDI-VAC 1/4"X20' N620A

## (undated) DEVICE — ESU ELEC BLADE 6" COATED E1450-6

## (undated) DEVICE — CUSTOM PACK TOTAL HIP SOP5BTHHEA

## (undated) DEVICE — GLOVE BIOGEL PI SZ 7.0 40870

## (undated) DEVICE — HOLDER LIMB VELCRO OR 0814-1533

## (undated) DEVICE — CELL SAVER SUPPLIES

## (undated) DEVICE — SPONGE LAP 18X18" X8435

## (undated) DEVICE — KIT DRAIN CLOSED WOUND SUCTION MED 400ML RESVR

## (undated) DEVICE — GLOVE UNDER INDICATOR PI SZ 7.0 LF 41670

## (undated) RX ORDER — ONDANSETRON 2 MG/ML
INJECTION INTRAMUSCULAR; INTRAVENOUS
Status: DISPENSED
Start: 2025-05-08

## (undated) RX ORDER — PROPOFOL 10 MG/ML
INJECTION, EMULSION INTRAVENOUS
Status: DISPENSED
Start: 2025-05-08

## (undated) RX ORDER — HEPARIN SODIUM,PORCINE 10 UNIT/ML
VIAL (ML) INTRAVENOUS
Status: DISPENSED
Start: 2024-11-06

## (undated) RX ORDER — LIDOCAINE HYDROCHLORIDE 10 MG/ML
INJECTION, SOLUTION INFILTRATION; PERINEURAL
Status: DISPENSED
Start: 2024-11-06

## (undated) RX ORDER — EPHEDRINE SULFATE 50 MG/ML
INJECTION, SOLUTION INTRAMUSCULAR; INTRAVENOUS; SUBCUTANEOUS
Status: DISPENSED
Start: 2024-11-05

## (undated) RX ORDER — LIDOCAINE HYDROCHLORIDE 10 MG/ML
INJECTION, SOLUTION EPIDURAL; INFILTRATION; INTRACAUDAL; PERINEURAL
Status: DISPENSED
Start: 2024-11-05

## (undated) RX ORDER — FENTANYL CITRATE 50 UG/ML
INJECTION, SOLUTION INTRAMUSCULAR; INTRAVENOUS
Status: DISPENSED
Start: 2024-11-05

## (undated) RX ORDER — DEXAMETHASONE SODIUM PHOSPHATE 10 MG/ML
INJECTION, EMULSION INTRAMUSCULAR; INTRAVENOUS
Status: DISPENSED
Start: 2025-05-08

## (undated) RX ORDER — PHENYLEPHRINE HYDROCHLORIDE 10 MG/ML
INJECTION INTRAVENOUS
Status: DISPENSED
Start: 2024-11-05

## (undated) RX ORDER — PROPOFOL 10 MG/ML
INJECTION, EMULSION INTRAVENOUS
Status: DISPENSED
Start: 2024-11-05

## (undated) RX ORDER — PHENYLEPHRINE HYDROCHLORIDE 10 MG/ML
INJECTION INTRAVENOUS
Status: DISPENSED
Start: 2025-05-08

## (undated) RX ORDER — FENTANYL CITRATE-0.9 % NACL/PF 10 MCG/ML
PLASTIC BAG, INJECTION (ML) INTRAVENOUS
Status: DISPENSED
Start: 2025-05-08

## (undated) RX ORDER — LIDOCAINE HYDROCHLORIDE 10 MG/ML
INJECTION, SOLUTION EPIDURAL; INFILTRATION; INTRACAUDAL; PERINEURAL
Status: DISPENSED
Start: 2025-05-08

## (undated) RX ORDER — BUPIVACAINE HYDROCHLORIDE 5 MG/ML
INJECTION, SOLUTION EPIDURAL; INTRACAUDAL
Status: DISPENSED
Start: 2024-11-05

## (undated) RX ORDER — BUPIVACAINE HYDROCHLORIDE 5 MG/ML
INJECTION, SOLUTION EPIDURAL; INTRACAUDAL; PERINEURAL
Status: DISPENSED
Start: 2025-05-08

## (undated) RX ORDER — DEXAMETHASONE SODIUM PHOSPHATE 4 MG/ML
INJECTION, SOLUTION INTRA-ARTICULAR; INTRALESIONAL; INTRAMUSCULAR; INTRAVENOUS; SOFT TISSUE
Status: DISPENSED
Start: 2025-05-08

## (undated) RX ORDER — KETOROLAC TROMETHAMINE 30 MG/ML
INJECTION, SOLUTION INTRAMUSCULAR; INTRAVENOUS
Status: DISPENSED
Start: 2025-05-08

## (undated) RX ORDER — HEPARIN SODIUM 200 [USP'U]/100ML
INJECTION, SOLUTION INTRAVENOUS
Status: DISPENSED
Start: 2024-11-06

## (undated) RX ORDER — ONDANSETRON 2 MG/ML
INJECTION INTRAMUSCULAR; INTRAVENOUS
Status: DISPENSED
Start: 2024-11-05

## (undated) RX ORDER — FENTANYL CITRATE 50 UG/ML
INJECTION, SOLUTION INTRAMUSCULAR; INTRAVENOUS
Status: DISPENSED
Start: 2025-05-08